# Patient Record
Sex: FEMALE | Race: WHITE | Employment: OTHER | URBAN - METROPOLITAN AREA
[De-identification: names, ages, dates, MRNs, and addresses within clinical notes are randomized per-mention and may not be internally consistent; named-entity substitution may affect disease eponyms.]

---

## 2017-02-13 RX ORDER — LEVOTHYROXINE SODIUM 0.15 MG/1
TABLET ORAL
Qty: 90 TABLET | Refills: 0 | Status: SHIPPED | OUTPATIENT
Start: 2017-02-13 | End: 2017-05-25 | Stop reason: SDUPTHER

## 2017-02-24 ENCOUNTER — OFFICE VISIT (OUTPATIENT)
Dept: FAMILY MEDICINE CLINIC | Facility: CLINIC | Age: 47
End: 2017-02-24

## 2017-02-24 ENCOUNTER — HOSPITAL ENCOUNTER (OUTPATIENT)
Age: 47
Setting detail: SPECIMEN
Discharge: HOME | End: 2017-02-24
Payer: COMMERCIAL

## 2017-02-24 VITALS
DIASTOLIC BLOOD PRESSURE: 80 MMHG | SYSTOLIC BLOOD PRESSURE: 128 MMHG | HEART RATE: 80 BPM | HEIGHT: 68 IN | WEIGHT: 206 LBS | BODY MASS INDEX: 31.22 KG/M2

## 2017-02-24 DIAGNOSIS — E03.8 HYPOTHYROIDISM DUE TO HASHIMOTO'S THYROIDITIS: ICD-10-CM

## 2017-02-24 DIAGNOSIS — E06.3 HYPOTHYROIDISM DUE TO HASHIMOTO'S THYROIDITIS: ICD-10-CM

## 2017-02-24 DIAGNOSIS — E06.3 HYPOTHYROIDISM DUE TO HASHIMOTO'S THYROIDITIS: Primary | ICD-10-CM

## 2017-02-24 DIAGNOSIS — R53.83 OTHER FATIGUE: ICD-10-CM

## 2017-02-24 DIAGNOSIS — Z13.220 ENCOUNTER FOR SCREENING FOR LIPID DISORDER: ICD-10-CM

## 2017-02-24 DIAGNOSIS — R63.5 WEIGHT GAIN: ICD-10-CM

## 2017-02-24 DIAGNOSIS — E03.8 HYPOTHYROIDISM DUE TO HASHIMOTO'S THYROIDITIS: Primary | ICD-10-CM

## 2017-02-24 LAB
ABSOLUTE EOS #: 0.1 K/UL (ref 0–0.4)
ABSOLUTE LYMPH #: 1.3 K/UL (ref 1–4.8)
ABSOLUTE MONO #: 0.5 K/UL (ref 0.1–1.2)
ALBUMIN SERPL-MCNC: 4.2 G/DL (ref 3.5–5.2)
ALBUMIN/GLOBULIN RATIO: 1.4 (ref 1–2.5)
ALP BLD-CCNC: 64 U/L (ref 35–104)
ALT SERPL-CCNC: 17 U/L (ref 5–33)
ANION GAP SERPL CALCULATED.3IONS-SCNC: 23 MMOL/L (ref 9–17)
AST SERPL-CCNC: 18 U/L
BASOPHILS # BLD: 1 % (ref 0–2)
BASOPHILS ABSOLUTE: 0 K/UL (ref 0–0.2)
BILIRUB SERPL-MCNC: 0.35 MG/DL (ref 0.3–1.2)
BUN BLDV-MCNC: 10 MG/DL (ref 6–20)
BUN/CREAT BLD: ABNORMAL (ref 9–20)
CALCIUM SERPL-MCNC: 9.1 MG/DL (ref 8.6–10.4)
CHLORIDE BLD-SCNC: 97 MMOL/L (ref 98–107)
CHOLESTEROL/HDL RATIO: 3.1
CHOLESTEROL: 189 MG/DL
CO2: 22 MMOL/L (ref 20–31)
CREAT SERPL-MCNC: 0.8 MG/DL (ref 0.5–0.9)
DIFFERENTIAL TYPE: ABNORMAL
EOSINOPHILS RELATIVE PERCENT: 1 % (ref 1–4)
GFR AFRICAN AMERICAN: >60 ML/MIN
GFR NON-AFRICAN AMERICAN: >60 ML/MIN
GFR SERPL CREATININE-BSD FRML MDRD: ABNORMAL ML/MIN/{1.73_M2}
GFR SERPL CREATININE-BSD FRML MDRD: ABNORMAL ML/MIN/{1.73_M2}
GLUCOSE BLD-MCNC: 92 MG/DL (ref 70–99)
HCT VFR BLD CALC: 38.1 % (ref 36–46)
HDLC SERPL-MCNC: 61 MG/DL
HEMOGLOBIN: 13 G/DL (ref 12–16)
LDL CHOLESTEROL: 106 MG/DL (ref 0–130)
LYMPHOCYTES # BLD: 23 % (ref 24–44)
MCH RBC QN AUTO: 31.4 PG (ref 26–34)
MCHC RBC AUTO-ENTMCNC: 34.1 G/DL (ref 31–37)
MCV RBC AUTO: 92 FL (ref 80–100)
MONOCYTES # BLD: 9 % (ref 2–11)
PDW BLD-RTO: 12.8 % (ref 12.5–15.4)
PLATELET # BLD: 292 K/UL (ref 140–450)
PLATELET ESTIMATE: ABNORMAL
PMV BLD AUTO: 9 FL (ref 6–12)
POTASSIUM SERPL-SCNC: 4.9 MMOL/L (ref 3.7–5.3)
RBC # BLD: 4.14 M/UL (ref 4–5.2)
RBC # BLD: ABNORMAL 10*6/UL
SEG NEUTROPHILS: 66 % (ref 36–66)
SEGMENTED NEUTROPHILS ABSOLUTE COUNT: 3.8 K/UL (ref 1.8–7.7)
SODIUM BLD-SCNC: 142 MMOL/L (ref 135–144)
T3 FREE: 2.54 PG/ML (ref 2.02–4.43)
THYROXINE, FREE: 1.49 NG/DL (ref 0.93–1.7)
TOTAL PROTEIN: 7.3 G/DL (ref 6.4–8.3)
TRIGL SERPL-MCNC: 109 MG/DL
TSH SERPL DL<=0.05 MIU/L-ACNC: 2.99 MIU/L (ref 0.3–5)
VITAMIN D 25-HYDROXY: 23 NG/ML (ref 30–100)
VLDLC SERPL CALC-MCNC: NORMAL MG/DL (ref 1–30)
WBC # BLD: 5.7 K/UL (ref 3.5–11)
WBC # BLD: ABNORMAL 10*3/UL

## 2017-02-24 PROCEDURE — 99213 OFFICE O/P EST LOW 20 MIN: CPT | Performed by: NURSE PRACTITIONER

## 2017-02-24 RX ORDER — OMEPRAZOLE 20 MG/1
20 TABLET, DELAYED RELEASE ORAL DAILY
COMMUNITY
End: 2017-05-31 | Stop reason: SDUPTHER

## 2017-02-24 ASSESSMENT — ENCOUNTER SYMPTOMS
COUGH: 0
SHORTNESS OF BREATH: 0

## 2017-05-21 ENCOUNTER — HOSPITAL ENCOUNTER (EMERGENCY)
Facility: CLINIC | Age: 47
Discharge: HOME | End: 2017-05-21
Attending: EMERGENCY MEDICINE
Payer: COMMERCIAL

## 2017-05-21 VITALS
BODY MASS INDEX: 31.39 KG/M2 | RESPIRATION RATE: 18 BRPM | TEMPERATURE: 97.8 F | SYSTOLIC BLOOD PRESSURE: 137 MMHG | DIASTOLIC BLOOD PRESSURE: 107 MMHG | HEART RATE: 77 BPM | HEIGHT: 67 IN | OXYGEN SATURATION: 99 % | WEIGHT: 200 LBS

## 2017-05-21 DIAGNOSIS — H66.001 ACUTE SUPPURATIVE OTITIS MEDIA OF RIGHT EAR WITHOUT SPONTANEOUS RUPTURE OF TYMPANIC MEMBRANE, RECURRENCE NOT SPECIFIED: Primary | ICD-10-CM

## 2017-05-21 DIAGNOSIS — J02.0 STREP THROAT: ICD-10-CM

## 2017-05-21 PROCEDURE — 6370000000 HC RX 637 (ALT 250 FOR IP): Performed by: EMERGENCY MEDICINE

## 2017-05-21 PROCEDURE — 99282 EMERGENCY DEPT VISIT SF MDM: CPT

## 2017-05-21 RX ORDER — IBUPROFEN 800 MG/1
800 TABLET ORAL EVERY 8 HOURS PRN
Qty: 20 TABLET | Refills: 0 | Status: SHIPPED | OUTPATIENT
Start: 2017-05-21 | End: 2018-05-06

## 2017-05-21 RX ORDER — AMOXICILLIN 500 MG/1
500 CAPSULE ORAL 3 TIMES DAILY
Qty: 30 CAPSULE | Refills: 0 | Status: SHIPPED | OUTPATIENT
Start: 2017-05-21 | End: 2017-05-31

## 2017-05-21 RX ORDER — AMOXICILLIN 250 MG/1
500 CAPSULE ORAL ONCE
Status: COMPLETED | OUTPATIENT
Start: 2017-05-21 | End: 2017-05-21

## 2017-05-21 RX ORDER — IBUPROFEN 800 MG/1
800 TABLET ORAL ONCE
Status: COMPLETED | OUTPATIENT
Start: 2017-05-21 | End: 2017-05-21

## 2017-05-21 RX ADMIN — IBUPROFEN 800 MG: 800 TABLET, FILM COATED ORAL at 03:03

## 2017-05-21 RX ADMIN — AMOXICILLIN 500 MG: 250 CAPSULE ORAL at 03:03

## 2017-05-21 ASSESSMENT — ENCOUNTER SYMPTOMS
SHORTNESS OF BREATH: 0
CHEST TIGHTNESS: 0
EYE DISCHARGE: 0
ABDOMINAL DISTENTION: 0
ABDOMINAL PAIN: 0
EYE REDNESS: 0
SORE THROAT: 1
FACIAL SWELLING: 0

## 2017-05-26 RX ORDER — LEVOTHYROXINE SODIUM 0.15 MG/1
TABLET ORAL
Qty: 30 TABLET | Refills: 0 | Status: SHIPPED | OUTPATIENT
Start: 2017-05-26 | End: 2017-06-26 | Stop reason: SDUPTHER

## 2017-05-31 ENCOUNTER — OFFICE VISIT (OUTPATIENT)
Dept: FAMILY MEDICINE CLINIC | Age: 47
End: 2017-05-31
Payer: COMMERCIAL

## 2017-05-31 VITALS
BODY MASS INDEX: 31.67 KG/M2 | HEART RATE: 76 BPM | TEMPERATURE: 99.4 F | SYSTOLIC BLOOD PRESSURE: 138 MMHG | WEIGHT: 209 LBS | DIASTOLIC BLOOD PRESSURE: 90 MMHG | HEIGHT: 68 IN

## 2017-05-31 DIAGNOSIS — H65.02 ACUTE SEROUS OTITIS MEDIA OF LEFT EAR, RECURRENCE NOT SPECIFIED: Primary | ICD-10-CM

## 2017-05-31 DIAGNOSIS — K21.9 GASTROESOPHAGEAL REFLUX DISEASE, ESOPHAGITIS PRESENCE NOT SPECIFIED: ICD-10-CM

## 2017-05-31 PROCEDURE — 99213 OFFICE O/P EST LOW 20 MIN: CPT | Performed by: NURSE PRACTITIONER

## 2017-05-31 RX ORDER — FLUTICASONE PROPIONATE 50 MCG
1 SPRAY, SUSPENSION (ML) NASAL 2 TIMES DAILY
Qty: 1 BOTTLE | Refills: 1 | Status: SHIPPED | OUTPATIENT
Start: 2017-05-31 | End: 2018-05-06

## 2017-05-31 RX ORDER — OMEPRAZOLE 20 MG/1
20 TABLET, DELAYED RELEASE ORAL DAILY
Qty: 30 TABLET | Refills: 3 | Status: SHIPPED | OUTPATIENT
Start: 2017-05-31 | End: 2017-10-20 | Stop reason: SDUPTHER

## 2017-05-31 ASSESSMENT — PATIENT HEALTH QUESTIONNAIRE - PHQ9
SUM OF ALL RESPONSES TO PHQ QUESTIONS 1-9: 0
1. LITTLE INTEREST OR PLEASURE IN DOING THINGS: 0
SUM OF ALL RESPONSES TO PHQ9 QUESTIONS 1 & 2: 0
2. FEELING DOWN, DEPRESSED OR HOPELESS: 0

## 2017-05-31 ASSESSMENT — ENCOUNTER SYMPTOMS
SORE THROAT: 0
VOMITING: 0
COUGH: 0

## 2017-06-05 ENCOUNTER — TELEPHONE (OUTPATIENT)
Dept: FAMILY MEDICINE CLINIC | Age: 47
End: 2017-06-05

## 2017-10-20 DIAGNOSIS — K21.9 GASTROESOPHAGEAL REFLUX DISEASE, ESOPHAGITIS PRESENCE NOT SPECIFIED: ICD-10-CM

## 2017-10-20 RX ORDER — LEVOTHYROXINE SODIUM 0.15 MG/1
TABLET ORAL
Qty: 30 TABLET | Refills: 5 | Status: SHIPPED | OUTPATIENT
Start: 2017-10-20 | End: 2018-04-16 | Stop reason: SDUPTHER

## 2017-10-20 RX ORDER — ALUMINUM ZIRCONIUM TRICHLOROHYDREX GLY 0.19 G/G
STICK TOPICAL
Qty: 30 TABLET | Refills: 5 | Status: SHIPPED | OUTPATIENT
Start: 2017-10-20 | End: 2018-04-16 | Stop reason: SDUPTHER

## 2017-10-20 NOTE — TELEPHONE ENCOUNTER
Pharmacy request, last appt 5/31/17    Health Maintenance   Topic Date Due    HIV screen  05/21/1983    DTaP/Tdap/Td vaccine (1 - Tdap) 05/21/1987    Cervical cancer screen  05/21/1989    Flu vaccine (1) 09/01/2017    Lipid screen  02/24/2022             (applicable per patient's age: Cancer Screenings, Depression Screening, Fall Risk Screening, Immunizations)    Hemoglobin A1C (%)   Date Value   03/16/2012 4.9     LDL Cholesterol (mg/dL)   Date Value   02/24/2017 106     AST (U/L)   Date Value   02/24/2017 18     ALT (U/L)   Date Value   02/24/2017 17     BUN (mg/dL)   Date Value   02/24/2017 10      (goal A1C is < 7)   (goal LDL is <100) need 30-50% reduction from baseline     BP Readings from Last 3 Encounters:   05/31/17 (!) 138/90   05/21/17 (!) 137/107   02/24/17 128/80    (goal /80)      All Future Testing planned in [facility]:      Next Visit Date:  No future appointments.          Patient Active Problem List:     Hypothyroidism due to Hashimoto's thyroiditis

## 2018-05-06 ENCOUNTER — HOSPITAL ENCOUNTER (EMERGENCY)
Facility: CLINIC | Age: 48
Discharge: HOME | End: 2018-05-06
Attending: EMERGENCY MEDICINE
Payer: COMMERCIAL

## 2018-05-06 ENCOUNTER — APPOINTMENT (OUTPATIENT)
Dept: GENERAL RADIOLOGY | Facility: CLINIC | Age: 48
End: 2018-05-06
Payer: COMMERCIAL

## 2018-05-06 VITALS
HEART RATE: 81 BPM | WEIGHT: 200 LBS | BODY MASS INDEX: 30.31 KG/M2 | DIASTOLIC BLOOD PRESSURE: 71 MMHG | RESPIRATION RATE: 17 BRPM | HEIGHT: 68 IN | TEMPERATURE: 97.9 F | OXYGEN SATURATION: 100 % | SYSTOLIC BLOOD PRESSURE: 131 MMHG

## 2018-05-06 DIAGNOSIS — S93.402A SPRAIN OF LEFT ANKLE, UNSPECIFIED LIGAMENT, INITIAL ENCOUNTER: Primary | ICD-10-CM

## 2018-05-06 PROCEDURE — 99283 EMERGENCY DEPT VISIT LOW MDM: CPT

## 2018-05-06 PROCEDURE — 73610 X-RAY EXAM OF ANKLE: CPT

## 2018-05-06 ASSESSMENT — PAIN SCALES - GENERAL
PAINLEVEL_OUTOF10: 3
PAINLEVEL_OUTOF10: 3

## 2018-05-06 ASSESSMENT — PAIN DESCRIPTION - ORIENTATION
ORIENTATION: LEFT
ORIENTATION: LEFT

## 2018-05-06 ASSESSMENT — PAIN DESCRIPTION - FREQUENCY: FREQUENCY: INTERMITTENT

## 2018-05-06 ASSESSMENT — PAIN DESCRIPTION - DESCRIPTORS: DESCRIPTORS: ACHING

## 2018-05-06 ASSESSMENT — PAIN DESCRIPTION - PAIN TYPE
TYPE: ACUTE PAIN
TYPE: ACUTE PAIN

## 2018-05-06 ASSESSMENT — PAIN DESCRIPTION - LOCATION
LOCATION: ANKLE
LOCATION: ANKLE

## 2018-05-17 DIAGNOSIS — K21.9 GASTROESOPHAGEAL REFLUX DISEASE, ESOPHAGITIS PRESENCE NOT SPECIFIED: ICD-10-CM

## 2018-05-17 RX ORDER — LEVOTHYROXINE SODIUM 0.15 MG/1
TABLET ORAL
Qty: 30 TABLET | Refills: 0 | Status: SHIPPED | OUTPATIENT
Start: 2018-05-17 | End: 2018-06-16 | Stop reason: SDUPTHER

## 2018-05-17 RX ORDER — ALUMINUM ZIRCONIUM TRICHLOROHYDREX GLY 0.19 G/G
STICK TOPICAL
Qty: 30 TABLET | Refills: 0 | Status: SHIPPED | OUTPATIENT
Start: 2018-05-17 | End: 2018-06-16 | Stop reason: SDUPTHER

## 2018-08-30 ENCOUNTER — OFFICE VISIT (OUTPATIENT)
Dept: FAMILY MEDICINE CLINIC | Age: 48
End: 2018-08-30
Payer: COMMERCIAL

## 2018-08-30 ENCOUNTER — HOSPITAL ENCOUNTER (OUTPATIENT)
Age: 48
Setting detail: SPECIMEN
Discharge: HOME | End: 2018-08-30
Payer: COMMERCIAL

## 2018-08-30 VITALS
SYSTOLIC BLOOD PRESSURE: 132 MMHG | WEIGHT: 213 LBS | BODY MASS INDEX: 32.39 KG/M2 | DIASTOLIC BLOOD PRESSURE: 86 MMHG | TEMPERATURE: 99.2 F | HEART RATE: 84 BPM

## 2018-08-30 DIAGNOSIS — E06.3 HYPOTHYROIDISM DUE TO HASHIMOTO'S THYROIDITIS: ICD-10-CM

## 2018-08-30 DIAGNOSIS — E03.8 HYPOTHYROIDISM DUE TO HASHIMOTO'S THYROIDITIS: ICD-10-CM

## 2018-08-30 DIAGNOSIS — M25.572 CHRONIC PAIN OF LEFT ANKLE: Primary | ICD-10-CM

## 2018-08-30 DIAGNOSIS — Z11.4 SCREENING FOR HIV WITHOUT PRESENCE OF RISK FACTORS: ICD-10-CM

## 2018-08-30 DIAGNOSIS — G89.29 CHRONIC PAIN OF LEFT ANKLE: Primary | ICD-10-CM

## 2018-08-30 LAB
HIV AG/AB: NONREACTIVE
THYROXINE, FREE: 1.65 NG/DL (ref 0.93–1.7)
TSH SERPL DL<=0.05 MIU/L-ACNC: 2.76 MIU/L (ref 0.3–5)

## 2018-08-30 PROCEDURE — 36415 COLL VENOUS BLD VENIPUNCTURE: CPT | Performed by: FAMILY MEDICINE

## 2018-08-30 PROCEDURE — 99214 OFFICE O/P EST MOD 30 MIN: CPT | Performed by: FAMILY MEDICINE

## 2018-08-30 ASSESSMENT — ENCOUNTER SYMPTOMS
COUGH: 0
EYES NEGATIVE: 1
SHORTNESS OF BREATH: 0
ABDOMINAL PAIN: 0
CONSTIPATION: 0
BLOOD IN STOOL: 0

## 2018-08-30 ASSESSMENT — PATIENT HEALTH QUESTIONNAIRE - PHQ9
SUM OF ALL RESPONSES TO PHQ9 QUESTIONS 1 & 2: 0
1. LITTLE INTEREST OR PLEASURE IN DOING THINGS: 0
SUM OF ALL RESPONSES TO PHQ QUESTIONS 1-9: 0
SUM OF ALL RESPONSES TO PHQ QUESTIONS 1-9: 0
2. FEELING DOWN, DEPRESSED OR HOPELESS: 0

## 2018-08-30 NOTE — PROGRESS NOTES
Skin is warm and dry. Psychiatric: She has a normal mood and affect. Her behavior is normal.   Vitals reviewed. Assessment/PLan  1. Chronic pain of left ankle  Discussed options, prefers to see podiatrist.   - [facility]. Trisha Watkins DPM    2. Hypothyroidism due to Hashimoto's thyroiditis  Cont med, check labs. - TSH without Reflex; Future  - T4, Free; Future    3. Screening for HIV without presence of risk factors    - HIV Screen; Future      Marleni received counseling on the following healthy behaviors: exercise and medication adherence  Reviewed prior labs and health maintenance. Continue current medications, diet and exercise. Discussed use, benefit, and side effects of prescribed medications. Barriers to medication compliance addressed. Patient given educational materials - see patient instructions. All patient questions answered. Patient voiced understanding. Return if symptoms worsen or fail to improve.         Electronically signed by Shirley Guidry MD on 8/30/18 at 8:47 AM

## 2018-12-14 ENCOUNTER — TELEPHONE (OUTPATIENT)
Dept: FAMILY MEDICINE CLINIC | Age: 48
End: 2018-12-14
Payer: COMMERCIAL

## 2018-12-14 DIAGNOSIS — Z12.11 SCREENING FOR COLON CANCER: Primary | ICD-10-CM

## 2018-12-14 LAB
CONTROL: NORMAL
HEMOCCULT STL QL: NEGATIVE

## 2018-12-14 PROCEDURE — 82274 ASSAY TEST FOR BLOOD FECAL: CPT | Performed by: FAMILY MEDICINE

## 2019-01-12 DIAGNOSIS — K21.9 GASTROESOPHAGEAL REFLUX DISEASE, ESOPHAGITIS PRESENCE NOT SPECIFIED: ICD-10-CM

## 2019-01-14 RX ORDER — LEVOTHYROXINE SODIUM 0.15 MG/1
TABLET ORAL
Qty: 30 TABLET | Refills: 1 | Status: SHIPPED | OUTPATIENT
Start: 2019-01-14 | End: 2019-03-15 | Stop reason: SDUPTHER

## 2019-01-14 RX ORDER — ALUMINUM ZIRCONIUM TRICHLOROHYDREX GLY 0.19 G/G
STICK TOPICAL
Qty: 30 TABLET | Refills: 1 | Status: SHIPPED | OUTPATIENT
Start: 2019-01-14 | End: 2019-03-15 | Stop reason: SDUPTHER

## 2019-03-15 DIAGNOSIS — K21.9 GASTROESOPHAGEAL REFLUX DISEASE, ESOPHAGITIS PRESENCE NOT SPECIFIED: ICD-10-CM

## 2019-03-15 RX ORDER — LEVOTHYROXINE SODIUM 0.15 MG/1
TABLET ORAL
Qty: 30 TABLET | Refills: 0 | Status: SHIPPED | OUTPATIENT
Start: 2019-03-15 | End: 2019-04-17 | Stop reason: SDUPTHER

## 2019-03-15 RX ORDER — ALUMINUM ZIRCONIUM TRICHLOROHYDREX GLY 0.19 G/G
STICK TOPICAL
Qty: 30 TABLET | Refills: 0 | Status: SHIPPED | OUTPATIENT
Start: 2019-03-15 | End: 2019-04-17 | Stop reason: SDUPTHER

## 2019-04-30 ENCOUNTER — HOSPITAL ENCOUNTER (OUTPATIENT)
Age: 49
Setting detail: SPECIMEN
Discharge: HOME | End: 2019-04-30
Payer: COMMERCIAL

## 2019-04-30 ENCOUNTER — OFFICE VISIT (OUTPATIENT)
Dept: FAMILY MEDICINE CLINIC | Age: 49
End: 2019-04-30
Payer: COMMERCIAL

## 2019-04-30 VITALS
SYSTOLIC BLOOD PRESSURE: 138 MMHG | HEIGHT: 67 IN | HEART RATE: 68 BPM | DIASTOLIC BLOOD PRESSURE: 98 MMHG | BODY MASS INDEX: 31.08 KG/M2 | WEIGHT: 198 LBS | TEMPERATURE: 99.9 F

## 2019-04-30 DIAGNOSIS — R10.13 EPIGASTRIC PAIN: ICD-10-CM

## 2019-04-30 DIAGNOSIS — R10.84 GENERALIZED ABDOMINAL PAIN: ICD-10-CM

## 2019-04-30 DIAGNOSIS — R10.11 RUQ ABDOMINAL PAIN: ICD-10-CM

## 2019-04-30 DIAGNOSIS — K59.01 SLOW TRANSIT CONSTIPATION: ICD-10-CM

## 2019-04-30 DIAGNOSIS — R81 GLUCOSE FOUND IN URINE ON EXAMINATION: ICD-10-CM

## 2019-04-30 DIAGNOSIS — R10.84 GENERALIZED ABDOMINAL PAIN: Primary | ICD-10-CM

## 2019-04-30 LAB
ABSOLUTE EOS #: 0.13 K/UL (ref 0–0.44)
ABSOLUTE IMMATURE GRANULOCYTE: 0.04 K/UL (ref 0–0.3)
ABSOLUTE LYMPH #: 1.91 K/UL (ref 1.1–3.7)
ABSOLUTE MONO #: 0.55 K/UL (ref 0.1–1.2)
ALBUMIN SERPL-MCNC: 4.5 G/DL (ref 3.5–5.2)
ALBUMIN/GLOBULIN RATIO: 1.4 (ref 1–2.5)
ALP BLD-CCNC: 86 U/L (ref 35–104)
ALT SERPL-CCNC: 31 U/L (ref 5–33)
AMYLASE: 110 U/L (ref 28–100)
ANION GAP SERPL CALCULATED.3IONS-SCNC: 13 MMOL/L (ref 9–17)
AST SERPL-CCNC: 20 U/L
BASOPHILS # BLD: 1 % (ref 0–2)
BASOPHILS ABSOLUTE: 0.08 K/UL (ref 0–0.2)
BILIRUB SERPL-MCNC: 0.2 MG/DL (ref 0.3–1.2)
BILIRUBIN, POC: NORMAL
BLOOD URINE, POC: NORMAL
BUN BLDV-MCNC: 11 MG/DL (ref 6–20)
BUN/CREAT BLD: ABNORMAL (ref 9–20)
CALCIUM SERPL-MCNC: 9.3 MG/DL (ref 8.6–10.4)
CHLORIDE BLD-SCNC: 105 MMOL/L (ref 98–107)
CHP ED QC CHECK: NORMAL
CLARITY, POC: CLEAR
CO2: 22 MMOL/L (ref 20–31)
COLOR, POC: NORMAL
CREAT SERPL-MCNC: 0.71 MG/DL (ref 0.5–0.9)
DIFFERENTIAL TYPE: ABNORMAL
EOSINOPHILS RELATIVE PERCENT: 2 % (ref 1–4)
GFR AFRICAN AMERICAN: >60 ML/MIN
GFR NON-AFRICAN AMERICAN: >60 ML/MIN
GFR SERPL CREATININE-BSD FRML MDRD: ABNORMAL ML/MIN/{1.73_M2}
GFR SERPL CREATININE-BSD FRML MDRD: ABNORMAL ML/MIN/{1.73_M2}
GLUCOSE BLD-MCNC: 90 MG/DL (ref 70–99)
GLUCOSE BLD-MCNC: 97 MG/DL
GLUCOSE URINE, POC: 100
HCT VFR BLD CALC: 43.4 % (ref 36.3–47.1)
HEMOGLOBIN: 13.8 G/DL (ref 11.9–15.1)
IMMATURE GRANULOCYTES: 1 %
KETONES, POC: NORMAL
LEUKOCYTE EST, POC: NORMAL
LIPASE: 36 U/L (ref 13–60)
LYMPHOCYTES # BLD: 29 % (ref 24–43)
MCH RBC QN AUTO: 30.9 PG (ref 25.2–33.5)
MCHC RBC AUTO-ENTMCNC: 31.8 G/DL (ref 28.4–34.8)
MCV RBC AUTO: 97.1 FL (ref 82.6–102.9)
MONOCYTES # BLD: 8 % (ref 3–12)
NITRITE, POC: NORMAL
NRBC AUTOMATED: 0 PER 100 WBC
PDW BLD-RTO: 11.9 % (ref 11.8–14.4)
PH, POC: 7
PLATELET # BLD: 337 K/UL (ref 138–453)
PLATELET ESTIMATE: ABNORMAL
PMV BLD AUTO: 10.9 FL (ref 8.1–13.5)
POTASSIUM SERPL-SCNC: 4.9 MMOL/L (ref 3.7–5.3)
PROTEIN, POC: NORMAL
RBC # BLD: 4.47 M/UL (ref 3.95–5.11)
RBC # BLD: ABNORMAL 10*6/UL
SEG NEUTROPHILS: 59 % (ref 36–65)
SEGMENTED NEUTROPHILS ABSOLUTE COUNT: 3.84 K/UL (ref 1.5–8.1)
SODIUM BLD-SCNC: 140 MMOL/L (ref 135–144)
SPECIFIC GRAVITY, POC: 1.01
TOTAL PROTEIN: 7.8 G/DL (ref 6.4–8.3)
UROBILINOGEN, POC: 0.2
WBC # BLD: 6.6 K/UL (ref 3.5–11.3)
WBC # BLD: ABNORMAL 10*3/UL

## 2019-04-30 PROCEDURE — 36415 COLL VENOUS BLD VENIPUNCTURE: CPT | Performed by: FAMILY MEDICINE

## 2019-04-30 PROCEDURE — 99214 OFFICE O/P EST MOD 30 MIN: CPT | Performed by: FAMILY MEDICINE

## 2019-04-30 PROCEDURE — 82962 GLUCOSE BLOOD TEST: CPT | Performed by: FAMILY MEDICINE

## 2019-04-30 PROCEDURE — 81003 URINALYSIS AUTO W/O SCOPE: CPT | Performed by: FAMILY MEDICINE

## 2019-04-30 ASSESSMENT — PATIENT HEALTH QUESTIONNAIRE - PHQ9
2. FEELING DOWN, DEPRESSED OR HOPELESS: 0
1. LITTLE INTEREST OR PLEASURE IN DOING THINGS: 0
SUM OF ALL RESPONSES TO PHQ9 QUESTIONS 1 & 2: 0
SUM OF ALL RESPONSES TO PHQ QUESTIONS 1-9: 0
SUM OF ALL RESPONSES TO PHQ QUESTIONS 1-9: 0

## 2019-04-30 ASSESSMENT — ENCOUNTER SYMPTOMS
EYES NEGATIVE: 1
SHORTNESS OF BREATH: 0
BLOOD IN STOOL: 0
NAUSEA: 0
CONSTIPATION: 1
ABDOMINAL PAIN: 1
DIARRHEA: 0
COUGH: 0
VOMITING: 0

## 2019-04-30 NOTE — PROGRESS NOTES
Visit Information    Have you changed or started any medications since your last visit including any over-the-counter medicines, vitamins, or herbal medicines? no   Are you having any side effects from any of your medications? -  yes - constipation  Have you stopped taking any of your medications? Is so, why? -  no    Have you seen any other physician or provider since your last visit? No  Have you had any other diagnostic tests since your last visit? No  Have you been seen in the emergency room and/or had an admission to a hospital since we last saw you? No  Have you had your routine dental cleaning in the past 6 months? yes - February    Have you activated your [employer] account? If not, what are your barriers?  Yes     Patient Care Team:  Karol Ram MD as PCP - General (Family Medicine)    Medical History Review  Past Medical, Family, and Social History reviewed and does contribute to the patient presenting condition    Health Maintenance   Topic Date Due    DTaP/Tdap/Td vaccine (1 - Tdap) 05/21/1987    Shingles Vaccine (1 of 2) 05/21/2018    Breast cancer screen  02/08/2019    TSH testing  08/30/2019    Flu vaccine (Season Ended) 09/01/2019    Colon Cancer Screen FIT/FOBT  12/14/2019    Cervical cancer screen  08/02/2020    Lipid screen  02/24/2022    HIV screen  Completed    Pneumococcal 0-64 years Vaccine  Aged Out

## 2019-04-30 NOTE — PROGRESS NOTES
[facility] [place] [facility]  SARIKA SILVERIO [facility]  [address]  [address] [address]  Dept: [phone]    4/30/2019    CHIEF COMPLAINT    Chief Complaint   Patient presents with    Abdominal Pain       HPI    Yessica Solares is a 46 y.o. female who presents   Chief Complaint   Patient presents with    Abdominal Pain   . Abdominal Pain   This is a recurrent problem. The current episode started more than 1 month ago. The onset quality is sudden. The problem occurs every several days. Duration: 5 minutes. The problem has been waxing and waning. The pain is located in the generalized abdominal region, epigastric region, RUQ and LUQ. The pain is severe. The quality of the pain is colicky and cramping. The abdominal pain does not radiate. Associated symptoms include constipation (last bm 4 days ago). Pertinent negatives include no diarrhea, fever, frequency, headaches, nausea, vomiting or weight loss. The pain is relieved by standing. She has tried nothing for the symptoms. Prior diagnostic workup includes upper endoscopy (years ago). Vitals:    04/30/19 0900   BP: (!) 142/98   Pulse: 68   Temp: 99.9 °F (37.7 °C)   Weight: 198 lb (89.8 kg)   Height: 5' 7\" (1.702 m)       REVIEW OF SYSTEMS    Review of Systems   Constitutional: Negative for fever and weight loss. HENT: Negative. Eyes: Negative. Respiratory: Negative for cough and shortness of breath. Cardiovascular: Negative for chest pain, palpitations and leg swelling. Gastrointestinal: Positive for abdominal pain and constipation (last bm 4 days ago). Negative for blood in stool, diarrhea, nausea and vomiting. Genitourinary: Negative for frequency and urgency. Musculoskeletal: Negative. Skin: Negative. Neurological: Negative for dizziness and headaches. Psychiatric/Behavioral: The patient is not nervous/anxious.         PAST MEDICAL HISTORY    Past Medical History:   Diagnosis Date    Abnormal Pap smear of cervix     GERD (gastroesophageal reflux disease)     Hypothyroidism        FAMILY HISTORY    Family History   Problem Relation Age of Onset    Cancer Mother         unknown    COPD Father     Kidney Disease Father     Colon Polyps Brother        SOCIAL HISTORY    Social History     Socioeconomic History    Marital status:      Spouse name: None    Number of children: 2    Years of education: None    Highest education level: None   Occupational History    Occupation:    Social Needs    Financial resource strain: None    Food insecurity:     Worry: None     Inability: None    Transportation needs:     Medical: None     Non-medical: None   Tobacco Use    Smoking status: Former Smoker     Last attempt to quit: 2010     Years since quittin.1    Smokeless tobacco: Never Used   Substance and Sexual Activity    Alcohol use: Yes     Comment: rarely    Drug use: No    Sexual activity: Yes     Partners: Male   Lifestyle    Physical activity:     Days per week: None     Minutes per session: None    Stress: None   Relationships    Social connections:     Talks on phone: None     Gets together: None     Attends Congregation service: None     Active member of club or organization: None     Attends meetings of clubs or organizations: None     Relationship status: None    Intimate partner violence:     Fear of current or ex partner: None     Emotionally abused: None     Physically abused: None     Forced sexual activity: None   Other Topics Concern    None   Social History Narrative    None       SURGICAL HISTORY    Past Surgical History:   Procedure Laterality Date    LEEP      x3    TONSILLECTOMY         CURRENT MEDICATIONS    Current Outpatient Medications   Medication Sig Dispense Refill    PRILOSEC OTC 20 MG tablet TAKE ONE TABLET BY MOUTH DAILY 30 tablet 3    levothyroxine (SYNTHROID) 150 MCG tablet TAKE ONE TABLET BY MOUTH DAILY 30 tablet 3     No current facility-administered medications for this visit. ALLERGIES    No Known Allergies    PHYSICAL EXAM   Physical Exam   Constitutional: She is oriented to person, place, and time. She appears well-developed and well-nourished. HENT:   Head: Normocephalic. Mouth/Throat: Oropharynx is clear and moist.   Eyes: Pupils are equal, round, and reactive to light. Neck: Normal range of motion. Neck supple. No thyromegaly present. Cardiovascular: Normal rate, regular rhythm and normal heart sounds. No murmur heard. Pulmonary/Chest: Effort normal and breath sounds normal. She has no wheezes. She has no rales. Abdominal: Soft. She exhibits distension. There is tenderness in the right upper quadrant and epigastric area. There is no rebound, no guarding, no CVA tenderness, no tenderness at McBurney's point and negative Gonzalez's sign. Musculoskeletal: Normal range of motion. She exhibits no edema, tenderness or deformity. Lymphadenopathy:     She has no cervical adenopathy. Neurological: She is alert and oriented to person, place, and time. Skin: Skin is warm and dry. Psychiatric: She has a normal mood and affect. Her behavior is normal.   Vitals reviewed. Assessment/PLan  1. Generalized abdominal pain  Check labs and us. ua-normal except glucose. bs 95  - POCT Urinalysis No Micro (Auto)  - Urine Culture; Future  - Amylase; Future  - CBC Auto Differential; Future  - Lipase; Future  - Comprehensive Metabolic Panel; Future  - US GALLBLADDER RUQ; Future  - US LIVER; Future  - US PANCREAS; Future    2. Epigastric pain    - Amylase; Future  - Lipase; Future  - US PANCREAS; Future    3. RUQ abdominal pain    - Comprehensive Metabolic Panel; Future  - US GALLBLADDER RUQ; Future  - US LIVER; Future    4. Slow transit constipation  Encouraged using miralax to enhance regular bm. Increase hydration.        Darryl Kamron received counseling on the following healthy behaviors: nutrition and medication adherence  Reviewed prior labs and health maintenance. Continue current medications, diet and exercise. Discussed use, benefit, and side effects of prescribed medications. Barriers to medication compliance addressed. Patient given educational materials - see patient instructions. All patient questions answered. Patient voiced understanding. Return if symptoms worsen or fail to improve.         Electronically signed by Verdene Fleischer, MD on 4/30/19 at 9:04 AM

## 2019-05-01 ENCOUNTER — HOSPITAL ENCOUNTER (OUTPATIENT)
Dept: ULTRASOUND IMAGING | Facility: CLINIC | Age: 49
Discharge: HOME | End: 2019-05-03
Payer: COMMERCIAL

## 2019-05-01 DIAGNOSIS — R10.11 RUQ ABDOMINAL PAIN: ICD-10-CM

## 2019-05-01 DIAGNOSIS — R10.84 GENERALIZED ABDOMINAL PAIN: ICD-10-CM

## 2019-05-01 PROCEDURE — 76705 ECHO EXAM OF ABDOMEN: CPT

## 2019-05-02 ENCOUNTER — TELEPHONE (OUTPATIENT)
Dept: FAMILY MEDICINE CLINIC | Age: 49
End: 2019-05-02

## 2019-05-02 DIAGNOSIS — N30.00 ACUTE CYSTITIS WITHOUT HEMATURIA: Primary | ICD-10-CM

## 2019-05-02 DIAGNOSIS — K80.20 GALLSTONES: ICD-10-CM

## 2019-05-02 DIAGNOSIS — R10.84 GENERALIZED ABDOMINAL PAIN: Primary | ICD-10-CM

## 2019-05-02 LAB
CULTURE: ABNORMAL
Lab: ABNORMAL
SPECIMEN DESCRIPTION: ABNORMAL

## 2019-05-02 RX ORDER — CIPROFLOXACIN 500 MG/1
500 TABLET, FILM COATED ORAL 2 TIMES DAILY
Qty: 10 TABLET | Refills: 0 | Status: SHIPPED | OUTPATIENT
Start: 2019-05-02 | End: 2019-05-07

## 2019-05-02 NOTE — TELEPHONE ENCOUNTER
----- Message from Damian Samaniego MD sent at 5/2/2019  6:45 AM EDT -----  Dr. Carola Gregory at [facility]

## 2019-05-13 ENCOUNTER — TELEPHONE (OUTPATIENT)
Dept: FAMILY MEDICINE CLINIC | Age: 49
End: 2019-05-13

## 2019-05-13 DIAGNOSIS — K80.20 GALLSTONES: ICD-10-CM

## 2019-05-13 DIAGNOSIS — R10.84 GENERALIZED ABDOMINAL PAIN: Primary | ICD-10-CM

## 2019-05-14 ENCOUNTER — TELEPHONE (OUTPATIENT)
Dept: FAMILY MEDICINE CLINIC | Age: 49
End: 2019-05-14

## 2019-05-14 DIAGNOSIS — K80.20 GALLSTONES: ICD-10-CM

## 2019-05-14 DIAGNOSIS — R10.84 GENERALIZED ABDOMINAL PAIN: Primary | ICD-10-CM

## 2019-05-14 NOTE — TELEPHONE ENCOUNTER
Requesting referral to Dr. Essence Gonzalez because she checked and she can get her in sooner.  Fax [phone]  pended

## 2019-12-06 ENCOUNTER — OFFICE VISIT (OUTPATIENT)
Dept: FAMILY MEDICINE CLINIC | Age: 49
End: 2019-12-06
Payer: COMMERCIAL

## 2019-12-06 ENCOUNTER — HOSPITAL ENCOUNTER (OUTPATIENT)
Age: 49
Setting detail: SPECIMEN
Discharge: HOME | End: 2019-12-06
Payer: COMMERCIAL

## 2019-12-06 VITALS
SYSTOLIC BLOOD PRESSURE: 132 MMHG | HEIGHT: 67 IN | HEART RATE: 100 BPM | DIASTOLIC BLOOD PRESSURE: 84 MMHG | WEIGHT: 205 LBS | TEMPERATURE: 98.6 F | BODY MASS INDEX: 32.18 KG/M2

## 2019-12-06 DIAGNOSIS — R30.0 DYSURIA: ICD-10-CM

## 2019-12-06 DIAGNOSIS — J02.9 SORE THROAT: Primary | ICD-10-CM

## 2019-12-06 DIAGNOSIS — R31.29 OTHER MICROSCOPIC HEMATURIA: ICD-10-CM

## 2019-12-06 DIAGNOSIS — J02.9 SORE THROAT: ICD-10-CM

## 2019-12-06 LAB
BILIRUBIN, POC: NORMAL
BLOOD URINE, POC: NORMAL
CLARITY, POC: NORMAL
COLOR, POC: YELLOW
GLUCOSE URINE, POC: NORMAL
KETONES, POC: NORMAL
LEUKOCYTE EST, POC: NORMAL
NITRITE, POC: NORMAL
PH, POC: 5.5
PROTEIN, POC: NORMAL
S PYO AG THROAT QL: NORMAL
SPECIFIC GRAVITY, POC: 1.01
UROBILINOGEN, POC: 0.2

## 2019-12-06 PROCEDURE — 87880 STREP A ASSAY W/OPTIC: CPT | Performed by: FAMILY MEDICINE

## 2019-12-06 PROCEDURE — 81003 URINALYSIS AUTO W/O SCOPE: CPT | Performed by: FAMILY MEDICINE

## 2019-12-06 PROCEDURE — 99213 OFFICE O/P EST LOW 20 MIN: CPT | Performed by: FAMILY MEDICINE

## 2019-12-06 RX ORDER — CIPROFLOXACIN 500 MG/1
500 TABLET, FILM COATED ORAL 2 TIMES DAILY
Qty: 10 TABLET | Refills: 0 | Status: SHIPPED | OUTPATIENT
Start: 2019-12-06 | End: 2020-04-01 | Stop reason: SDUPTHER

## 2019-12-08 LAB
CULTURE: ABNORMAL
CULTURE: NORMAL
Lab: ABNORMAL
Lab: NORMAL
SPECIMEN DESCRIPTION: ABNORMAL
SPECIMEN DESCRIPTION: NORMAL

## 2019-12-19 RX ORDER — LEVOTHYROXINE SODIUM 0.15 MG/1
TABLET ORAL
Qty: 30 TABLET | Refills: 2 | Status: SHIPPED | OUTPATIENT
Start: 2019-12-19 | End: 2020-03-17

## 2019-12-26 ENCOUNTER — OFFICE VISIT (OUTPATIENT)
Dept: FAMILY MEDICINE CLINIC | Age: 49
End: 2019-12-26
Payer: COMMERCIAL

## 2019-12-26 ENCOUNTER — HOSPITAL ENCOUNTER (OUTPATIENT)
Age: 49
Setting detail: SPECIMEN
Discharge: HOME | End: 2019-12-26
Payer: COMMERCIAL

## 2019-12-26 VITALS
DIASTOLIC BLOOD PRESSURE: 84 MMHG | HEIGHT: 68 IN | WEIGHT: 206 LBS | HEART RATE: 88 BPM | SYSTOLIC BLOOD PRESSURE: 118 MMHG | BODY MASS INDEX: 31.22 KG/M2

## 2019-12-26 DIAGNOSIS — R31.21 ASYMPTOMATIC MICROSCOPIC HEMATURIA: Primary | ICD-10-CM

## 2019-12-26 DIAGNOSIS — E06.3 HYPOTHYROIDISM DUE TO HASHIMOTO'S THYROIDITIS: ICD-10-CM

## 2019-12-26 DIAGNOSIS — E03.8 HYPOTHYROIDISM DUE TO HASHIMOTO'S THYROIDITIS: ICD-10-CM

## 2019-12-26 LAB
BILIRUBIN, POC: NORMAL
BLOOD URINE, POC: NORMAL
CLARITY, POC: CLEAR
COLOR, POC: YELLOW
GLUCOSE URINE, POC: NORMAL
KETONES, POC: NORMAL
LEUKOCYTE EST, POC: NORMAL
NITRITE, POC: NORMAL
PH, POC: 7.5
PROTEIN, POC: NORMAL
SPECIFIC GRAVITY, POC: 1.02
THYROXINE, FREE: 1.46 NG/DL (ref 0.93–1.7)
TSH SERPL DL<=0.05 MIU/L-ACNC: 2.19 MIU/L (ref 0.3–5)
UROBILINOGEN, POC: 0.2

## 2019-12-26 PROCEDURE — 36415 COLL VENOUS BLD VENIPUNCTURE: CPT | Performed by: FAMILY MEDICINE

## 2019-12-26 PROCEDURE — 81003 URINALYSIS AUTO W/O SCOPE: CPT | Performed by: FAMILY MEDICINE

## 2019-12-26 PROCEDURE — 99214 OFFICE O/P EST MOD 30 MIN: CPT | Performed by: FAMILY MEDICINE

## 2019-12-26 ASSESSMENT — ENCOUNTER SYMPTOMS
RESPIRATORY NEGATIVE: 1
NAUSEA: 0
ALLERGIC/IMMUNOLOGIC NEGATIVE: 1
VOMITING: 0
EYES NEGATIVE: 1

## 2020-02-06 ENCOUNTER — HOSPITAL ENCOUNTER (OUTPATIENT)
Dept: CT IMAGING | Facility: CLINIC | Age: 50
Discharge: HOME | End: 2020-02-08
Payer: COMMERCIAL

## 2020-02-06 PROCEDURE — 74178 CT ABD&PLV WO CNTR FLWD CNTR: CPT

## 2020-02-06 PROCEDURE — 6360000004 HC RX CONTRAST MEDICATION: Performed by: UROLOGY

## 2020-02-06 PROCEDURE — 2580000003 HC RX 258: Performed by: UROLOGY

## 2020-02-06 RX ORDER — 0.9 % SODIUM CHLORIDE 0.9 %
80 INTRAVENOUS SOLUTION INTRAVENOUS ONCE
Status: COMPLETED | OUTPATIENT
Start: 2020-02-06 | End: 2020-02-06

## 2020-02-06 RX ORDER — SODIUM CHLORIDE 0.9 % (FLUSH) 0.9 %
10 SYRINGE (ML) INJECTION PRN
Status: DISCONTINUED | OUTPATIENT
Start: 2020-02-06 | End: 2020-02-09 | Stop reason: HOSPADM

## 2020-02-06 RX ADMIN — SODIUM CHLORIDE 80 ML: 9 INJECTION, SOLUTION INTRAVENOUS at 10:57

## 2020-02-06 RX ADMIN — Medication 10 ML: at 10:57

## 2020-02-06 RX ADMIN — IOPAMIDOL 120 ML: 755 INJECTION, SOLUTION INTRAVENOUS at 10:56

## 2020-02-26 ENCOUNTER — TELEPHONE (OUTPATIENT)
Dept: FAMILY MEDICINE CLINIC | Age: 50
End: 2020-02-26

## 2020-03-17 RX ORDER — LEVOTHYROXINE SODIUM 0.15 MG/1
TABLET ORAL
Qty: 30 TABLET | Refills: 1 | Status: SHIPPED | OUTPATIENT
Start: 2020-03-17 | End: 2020-05-18

## 2020-04-01 ENCOUNTER — TELEPHONE (OUTPATIENT)
Dept: FAMILY MEDICINE CLINIC | Age: 50
End: 2020-04-01

## 2020-04-01 RX ORDER — CIPROFLOXACIN 500 MG/1
500 TABLET, FILM COATED ORAL 2 TIMES DAILY
Qty: 10 TABLET | Refills: 0 | Status: SHIPPED | OUTPATIENT
Start: 2020-04-01 | End: 2020-04-06

## 2020-04-01 NOTE — TELEPHONE ENCOUNTER
Pt stated she has burning with urination that started this morning. Pt stated this is the third one she has had in a very short time, could taking soak baths be causing this?  Please send medication to Joaquina Huitron and Vicki Concepcion

## 2020-04-26 ENCOUNTER — HOSPITAL ENCOUNTER (EMERGENCY)
Facility: CLINIC | Age: 50
Discharge: HOME | End: 2020-04-26
Attending: EMERGENCY MEDICINE
Payer: COMMERCIAL

## 2020-04-26 ENCOUNTER — APPOINTMENT (OUTPATIENT)
Dept: GENERAL RADIOLOGY | Facility: CLINIC | Age: 50
End: 2020-04-26
Payer: COMMERCIAL

## 2020-04-26 VITALS
BODY MASS INDEX: 31.39 KG/M2 | HEART RATE: 85 BPM | SYSTOLIC BLOOD PRESSURE: 129 MMHG | WEIGHT: 200 LBS | OXYGEN SATURATION: 98 % | HEIGHT: 67 IN | TEMPERATURE: 97.2 F | RESPIRATION RATE: 12 BRPM | DIASTOLIC BLOOD PRESSURE: 91 MMHG

## 2020-04-26 PROCEDURE — 99283 EMERGENCY DEPT VISIT LOW MDM: CPT

## 2020-04-26 PROCEDURE — 73100 X-RAY EXAM OF WRIST: CPT

## 2020-04-26 PROCEDURE — 99152 MOD SED SAME PHYS/QHP 5/>YRS: CPT

## 2020-04-26 PROCEDURE — 73110 X-RAY EXAM OF WRIST: CPT

## 2020-04-26 PROCEDURE — 96374 THER/PROPH/DIAG INJ IV PUSH: CPT

## 2020-04-26 PROCEDURE — 25565 CLTX RDL&ULN SHFT FX W/MNPJ: CPT

## 2020-04-26 PROCEDURE — 73030 X-RAY EXAM OF SHOULDER: CPT

## 2020-04-26 PROCEDURE — 2500000003 HC RX 250 WO HCPCS: Performed by: EMERGENCY MEDICINE

## 2020-04-26 PROCEDURE — 96375 TX/PRO/DX INJ NEW DRUG ADDON: CPT

## 2020-04-26 PROCEDURE — 96361 HYDRATE IV INFUSION ADD-ON: CPT

## 2020-04-26 PROCEDURE — 2580000003 HC RX 258: Performed by: EMERGENCY MEDICINE

## 2020-04-26 PROCEDURE — 6360000002 HC RX W HCPCS: Performed by: EMERGENCY MEDICINE

## 2020-04-26 RX ORDER — MIDAZOLAM HYDROCHLORIDE 1 MG/ML
1 INJECTION INTRAMUSCULAR; INTRAVENOUS ONCE
Status: COMPLETED | OUTPATIENT
Start: 2020-04-26 | End: 2020-04-26

## 2020-04-26 RX ORDER — OXYCODONE HYDROCHLORIDE AND ACETAMINOPHEN 5; 325 MG/1; MG/1
1 TABLET ORAL EVERY 6 HOURS PRN
Qty: 12 TABLET | Refills: 0 | Status: SHIPPED | OUTPATIENT
Start: 2020-04-26 | End: 2020-04-29

## 2020-04-26 RX ORDER — ONDANSETRON 4 MG/1
4 TABLET, FILM COATED ORAL EVERY 8 HOURS PRN
Qty: 6 TABLET | Refills: 0 | Status: SHIPPED | OUTPATIENT
Start: 2020-04-26 | End: 2020-04-28

## 2020-04-26 RX ORDER — 0.9 % SODIUM CHLORIDE 0.9 %
500 INTRAVENOUS SOLUTION INTRAVENOUS ONCE
Status: COMPLETED | OUTPATIENT
Start: 2020-04-26 | End: 2020-04-26

## 2020-04-26 RX ORDER — KETOROLAC TROMETHAMINE 15 MG/ML
15 INJECTION, SOLUTION INTRAMUSCULAR; INTRAVENOUS ONCE
Status: COMPLETED | OUTPATIENT
Start: 2020-04-26 | End: 2020-04-26

## 2020-04-26 RX ORDER — FENTANYL CITRATE 50 UG/ML
50 INJECTION, SOLUTION INTRAMUSCULAR; INTRAVENOUS ONCE
Status: COMPLETED | OUTPATIENT
Start: 2020-04-26 | End: 2020-04-26

## 2020-04-26 RX ORDER — ONDANSETRON 2 MG/ML
4 INJECTION INTRAMUSCULAR; INTRAVENOUS ONCE
Status: COMPLETED | OUTPATIENT
Start: 2020-04-26 | End: 2020-04-26

## 2020-04-26 RX ORDER — ETOMIDATE 2 MG/ML
10 INJECTION INTRAVENOUS ONCE
Status: COMPLETED | OUTPATIENT
Start: 2020-04-26 | End: 2020-04-26

## 2020-04-26 RX ADMIN — KETOROLAC TROMETHAMINE 15 MG: 15 INJECTION, SOLUTION INTRAMUSCULAR; INTRAVENOUS at 17:00

## 2020-04-26 RX ADMIN — SODIUM CHLORIDE 500 ML: 9 INJECTION, SOLUTION INTRAVENOUS at 17:20

## 2020-04-26 RX ADMIN — HYDROMORPHONE HYDROCHLORIDE 0.5 MG: 1 INJECTION, SOLUTION INTRAMUSCULAR; INTRAVENOUS; SUBCUTANEOUS at 17:00

## 2020-04-26 RX ADMIN — ETOMIDATE 10 MG: 2 INJECTION, SOLUTION INTRAVENOUS at 18:35

## 2020-04-26 RX ADMIN — FENTANYL CITRATE 50 MCG: 50 INJECTION, SOLUTION INTRAMUSCULAR; INTRAVENOUS at 18:52

## 2020-04-26 RX ADMIN — ETOMIDATE 4 MG: 2 INJECTION, SOLUTION INTRAVENOUS at 18:37

## 2020-04-26 RX ADMIN — ONDANSETRON 4 MG: 2 INJECTION INTRAMUSCULAR; INTRAVENOUS at 17:00

## 2020-04-26 RX ADMIN — MIDAZOLAM 1 MG: 1 INJECTION INTRAMUSCULAR; INTRAVENOUS at 18:49

## 2020-04-26 ASSESSMENT — PAIN SCALES - GENERAL
PAINLEVEL_OUTOF10: 10
PAINLEVEL_OUTOF10: 10
PAINLEVEL_OUTOF10: 3
PAINLEVEL_OUTOF10: 6

## 2020-04-26 ASSESSMENT — PAIN DESCRIPTION - ORIENTATION: ORIENTATION: RIGHT

## 2020-04-26 ASSESSMENT — PAIN DESCRIPTION - LOCATION: LOCATION: WRIST;SHOULDER

## 2020-04-26 ASSESSMENT — PAIN DESCRIPTION - PROGRESSION: CLINICAL_PROGRESSION: GRADUALLY IMPROVING

## 2020-04-26 NOTE — ED NOTES
[facility], Radiology at bedside for post reduction films, pt awake alert et oriented, no distress noted, continue to monitor      Osiris Holden RN  04/26/20 1919

## 2020-04-26 NOTE — ED PROVIDER NOTES
facial asymmetry. Moving all 4 extremities. Gait testing initially deferred. Psychiatric:         Mood and Affect: Mood normal.         EMERGENCY DEPARTMENT COURSE and DIFFERENTIAL DIAGNOSIS/MDM:   Vitals:    Vitals:    04/26/20 1848 04/26/20 1854 04/26/20 1856 04/26/20 1919   BP:    (!) 129/91   Pulse:    85   Resp: 21 14 12 12   Temp:    97.2 °F (36.2 °C)   TempSrc:       SpO2: 97% 97% 99% 98%   Weight:       Height:           Patient presents to the emergency department with a complaint described above. Vitals are grossly normal and she is nontoxic. Physical examination reveals evidence of injury at the right wrist clearly, possible injury of the right shoulder. No other obvious trauma. At this time she will need x-rays of the affected extremities. She is neurovascularly intact in the affected extremities. I have ordered some IV fluids, IV pain medication, IV anti-inflammatory and IV antiemetics and I will reevaluate. DIAGNOSTIC RESULTS     LABS:  Labs Reviewed - No data to display    All other labs were within normal range or not returned as of this dictation. RADIOLOGY:  XR SHOULDER RIGHT (MIN 2 VIEWS)   Final Result   Acute fracture of the posterior right humeral head and surgical neck. XR WRIST RIGHT (MIN 3 VIEWS)   Preliminary Result   Acute distal radius and ulna fractures. XR WRIST RIGHT (2 VIEWS)    (Results Pending)         ED Course as of Apr 26 1919   Sun Apr 26, 2020   1700 Patient states she last ate approximately 4 hours ago. [TS]   [id number] Spoke with Dr. Barbie Cazares at 18, he is recommending that I go ahead and reduce the wrist in the emergency department, do a sugar tong type splint in the right upper extremity followed by a sling and follow-up in his office tomorrow    [TS]   1847 Did obtain verbal and written consent prior to the procedural sedation.   Right at the end of the procedure she started waking up a little bit before I put the sling on, I did order some more pain medication, fentanyl and some Versed IV.    [TS]      ED Course User Index  [TS] Jake Ignacio DO         PROCEDURES:  Unless otherwise noted below, none     Procedural sedation  Date/Time: 4/26/2020 7:16 PM  Performed by: Jake Ignacio DO  Authorized by: Jake Ignacio DO     Consent:     Consent obtained:  Verbal and written    Consent given by:  Patient    Risks discussed:   Allergic reaction, dysrhythmia, inadequate sedation, nausea, vomiting, prolonged sedation necessitating reversal and prolonged hypoxia resulting in organ damage    Alternatives discussed:  Analgesia without sedation and regional anesthesia  Indications:     Procedure performed:  Fracture reduction    Procedure necessitating sedation performed by:  Physician performing sedation    Intended level of sedation:  Moderate (conscious sedation)  Pre-sedation assessment:     Time since last food or drink:  1pm    ASA classification: class 2 - patient with mild systemic disease      Neck mobility: normal      Mouth opening:  3 or more finger widths    Mallampati score:  III - soft palate, base of uvula visible    Pre-sedation assessments completed and reviewed: airway patency, cardiovascular function, hydration status, mental status, nausea/vomiting, pain level, respiratory function and temperature      History of difficult intubation: no      Pre-sedation assessment completed:  4/26/2020 7:18 PM  Immediate pre-procedure details:     Reassessment: Patient reassessed immediately prior to procedure      Reviewed: vital signs and NPO status      Verified: bag valve mask available, emergency equipment available, intubation equipment available, IV patency confirmed, oxygen available and suction available    Procedure details (see MAR for exact dosages):     Preoxygenation:  Nasal cannula    Sedation:  Etomidate    Analgesia:  Fentanyl    Intra-procedure monitoring:  Blood pressure monitoring, cardiac monitor, continuous capnometry,

## 2020-04-28 ENCOUNTER — OFFICE VISIT (OUTPATIENT)
Dept: ORTHOPEDIC SURGERY | Age: 50
End: 2020-04-28
Payer: COMMERCIAL

## 2020-04-28 ENCOUNTER — HOSPITAL ENCOUNTER (OUTPATIENT)
Dept: CT IMAGING | Age: 50
Discharge: HOME | End: 2020-04-30
Payer: COMMERCIAL

## 2020-04-28 VITALS — HEIGHT: 67 IN | BODY MASS INDEX: 31.38 KG/M2 | WEIGHT: 199.96 LBS

## 2020-04-28 PROCEDURE — 73200 CT UPPER EXTREMITY W/O DYE: CPT

## 2020-04-28 PROCEDURE — 99203 OFFICE O/P NEW LOW 30 MIN: CPT | Performed by: STUDENT IN AN ORGANIZED HEALTH CARE EDUCATION/TRAINING PROGRAM

## 2020-04-29 ENCOUNTER — HOSPITAL ENCOUNTER (OUTPATIENT)
Dept: PREADMISSION TESTING | Age: 50
Discharge: HOME | End: 2020-05-03
Payer: COMMERCIAL

## 2020-04-29 PROCEDURE — U0002 COVID-19 LAB TEST NON-CDC: HCPCS

## 2020-04-30 LAB
SARS-COV-2, PCR: NORMAL
SARS-COV-2, RAPID: NORMAL
SARS-COV-2: NOT DETECTED
SOURCE: NORMAL

## 2020-05-01 ENCOUNTER — TELEPHONE (OUTPATIENT)
Dept: FAMILY MEDICINE CLINIC | Age: 50
End: 2020-05-01

## 2020-05-01 ENCOUNTER — OFFICE VISIT (OUTPATIENT)
Dept: ORTHOPEDIC SURGERY | Age: 50
End: 2020-05-01
Payer: COMMERCIAL

## 2020-05-01 VITALS — HEIGHT: 68 IN | BODY MASS INDEX: 30.31 KG/M2 | WEIGHT: 200 LBS

## 2020-05-01 PROBLEM — S42.251A CLOSED DISPLACED FRACTURE OF GREATER TUBEROSITY OF RIGHT HUMERUS: Status: ACTIVE | Noted: 2020-05-01

## 2020-05-01 PROBLEM — S52.501A CLOSED FRACTURE OF RIGHT DISTAL RADIUS: Status: ACTIVE | Noted: 2020-05-01

## 2020-05-01 PROCEDURE — 99214 OFFICE O/P EST MOD 30 MIN: CPT | Performed by: ORTHOPAEDIC SURGERY

## 2020-05-01 NOTE — LETTER
[facility], [address] [place] [address], [address] [place] [address]  [phone]    Dr. Alexander Villalobos MD        2020      RE: Peter QUARLES 1970    Dear Dr. Dee Gudino,    Based on review of information available to me at this time, the patient appears to be at no increased risk for the planned procedure. Current Outpatient Medications on File Prior to Visit   Medication Sig Dispense Refill    levothyroxine (SYNTHROID) 150 MCG tablet TAKE ONE TABLET BY MOUTH DAILY 30 tablet 1    PRILOSEC OTC 20 MG tablet TAKE ONE TABLET BY MOUTH DAILY 30 tablet 3     No current facility-administered medications on file prior to visit. Past Surgical History:   Procedure Laterality Date    LEEP      x3    TONSILLECTOMY           If you have any questions, please feel free to contact me.     Sincerely,        Dr. Alexander Villalobos MD

## 2020-05-01 NOTE — PROGRESS NOTES
ORTHOPEDIC PATIENT EVALUATION      HPI / Chief Complaint  Melinda Godoy is a 44 y.o. female who presents for evaluation of her right arm. On 4/26/2020 she indicates that she was tripped by her dog just outside of her home and she fell down some concrete steps onto a landing on her right arm. She had immediate pain and was seen at the emergency department in [place] where she was diagnosed with a right distal radius fracture as well as right greater tuberosity fracture. She was placed in a splint and in a sling. She was subsequently seen by Dr. Belinda Mcgregor who has referred her to my clinic for further evaluation and definitive treatment. Her pain at this time remains localized to her right wrist/hand as well as her shoulder. She reports having some tingling in all of her fingers. Past Medical History  Yajaira Son  has a past medical history of Abnormal Pap smear of cervix, GERD (gastroesophageal reflux disease), and Hypothyroidism. Past Surgical History  Yajaira Son  has a past surgical history that includes LEEP and Tonsillectomy. Current Medications  Current Outpatient Medications   Medication Sig Dispense Refill    levothyroxine (SYNTHROID) 150 MCG tablet TAKE ONE TABLET BY MOUTH DAILY 30 tablet 1    PRILOSEC OTC 20 MG tablet TAKE ONE TABLET BY MOUTH DAILY 30 tablet 3     No current facility-administered medications for this visit. Allergies  Allergies have been reviewed. Yajaira Son has No Known Allergies. Social History  Yajaira Son  reports that she quit smoking about 10 years ago. She has never used smokeless tobacco. She reports current alcohol use. She reports that she does not use drugs. Family History  Marleni's family history includes COPD in her father; Cancer in her mother; Colon Polyps in her brother; Kidney Disease in her father. Review of Systems   History obtained from the patient.    REVIEW OF SYSTEMS:   Constitution: negative for fever, chills, weight loss or malaise

## 2020-05-02 ENCOUNTER — HOSPITAL ENCOUNTER (OUTPATIENT)
Age: 50
Setting detail: SPECIMEN
Discharge: HOME | End: 2020-05-02
Payer: COMMERCIAL

## 2020-05-04 ENCOUNTER — ANESTHESIA EVENT (OUTPATIENT)
Dept: OPERATING ROOM | Age: 50
End: 2020-05-04
Payer: COMMERCIAL

## 2020-05-04 RX ORDER — SODIUM CHLORIDE 0.9 % (FLUSH) 0.9 %
10 SYRINGE (ML) INJECTION EVERY 12 HOURS SCHEDULED
Status: CANCELLED | OUTPATIENT
Start: 2020-05-04

## 2020-05-04 RX ORDER — SODIUM CHLORIDE 0.9 % (FLUSH) 0.9 %
10 SYRINGE (ML) INJECTION PRN
Status: CANCELLED | OUTPATIENT
Start: 2020-05-04

## 2020-05-04 RX ORDER — ACETAMINOPHEN 325 MG/1
1000 TABLET ORAL ONCE
Status: CANCELLED | OUTPATIENT
Start: 2020-05-04 | End: 2020-05-04

## 2020-05-05 ENCOUNTER — APPOINTMENT (OUTPATIENT)
Dept: GENERAL RADIOLOGY | Age: 50
End: 2020-05-05
Attending: ORTHOPAEDIC SURGERY
Payer: COMMERCIAL

## 2020-05-05 ENCOUNTER — HOSPITAL ENCOUNTER (OUTPATIENT)
Age: 50
Setting detail: OUTPATIENT SURGERY
Discharge: HOME | End: 2020-05-05
Attending: ORTHOPAEDIC SURGERY | Admitting: ORTHOPAEDIC SURGERY
Payer: COMMERCIAL

## 2020-05-05 ENCOUNTER — ANESTHESIA (OUTPATIENT)
Dept: OPERATING ROOM | Age: 50
End: 2020-05-05
Payer: COMMERCIAL

## 2020-05-05 VITALS
SYSTOLIC BLOOD PRESSURE: 118 MMHG | RESPIRATION RATE: 16 BRPM | OXYGEN SATURATION: 96 % | HEART RATE: 87 BPM | HEIGHT: 68 IN | DIASTOLIC BLOOD PRESSURE: 73 MMHG | TEMPERATURE: 97.9 F | WEIGHT: 200 LBS | BODY MASS INDEX: 30.31 KG/M2

## 2020-05-05 VITALS — TEMPERATURE: 94.5 F | SYSTOLIC BLOOD PRESSURE: 127 MMHG | OXYGEN SATURATION: 98 % | DIASTOLIC BLOOD PRESSURE: 70 MMHG

## 2020-05-05 LAB
-: NORMAL
HCG, PREGNANCY URINE (POC): NEGATIVE
SARS-COV-2, PCR: NOT DETECTED
SARS-COV-2, RAPID: NORMAL
SARS-COV-2: NORMAL
SOURCE: NORMAL

## 2020-05-05 PROCEDURE — C1713 ANCHOR/SCREW BN/BN,TIS/BN: HCPCS | Performed by: ORTHOPAEDIC SURGERY

## 2020-05-05 PROCEDURE — 64415 NJX AA&/STRD BRCH PLXS IMG: CPT | Performed by: ANESTHESIOLOGY

## 2020-05-05 PROCEDURE — 7100000030 HC ASPR PHASE II RECOVERY - FIRST 15 MIN: Performed by: ORTHOPAEDIC SURGERY

## 2020-05-05 PROCEDURE — 6370000000 HC RX 637 (ALT 250 FOR IP): Performed by: ANESTHESIOLOGY

## 2020-05-05 PROCEDURE — 3700000000 HC ANESTHESIA ATTENDED CARE: Performed by: ORTHOPAEDIC SURGERY

## 2020-05-05 PROCEDURE — 23630 OPTX GR HMRL TBRS FX INT FIX: CPT | Performed by: ORTHOPAEDIC SURGERY

## 2020-05-05 PROCEDURE — 76000 FLUOROSCOPY <1 HR PHYS/QHP: CPT

## 2020-05-05 PROCEDURE — 3600000003 HC SURGERY LEVEL 3 BASE: Performed by: ORTHOPAEDIC SURGERY

## 2020-05-05 PROCEDURE — 2500000003 HC RX 250 WO HCPCS: Performed by: ANESTHESIOLOGY

## 2020-05-05 PROCEDURE — 6360000002 HC RX W HCPCS: Performed by: NURSE ANESTHETIST, CERTIFIED REGISTERED

## 2020-05-05 PROCEDURE — 73060 X-RAY EXAM OF HUMERUS: CPT

## 2020-05-05 PROCEDURE — 6360000002 HC RX W HCPCS: Performed by: ANESTHESIOLOGY

## 2020-05-05 PROCEDURE — 7100000011 HC PHASE II RECOVERY - ADDTL 15 MIN: Performed by: ORTHOPAEDIC SURGERY

## 2020-05-05 PROCEDURE — 7100000010 HC PHASE II RECOVERY - FIRST 15 MIN: Performed by: ORTHOPAEDIC SURGERY

## 2020-05-05 PROCEDURE — 2580000003 HC RX 258: Performed by: NURSE ANESTHETIST, CERTIFIED REGISTERED

## 2020-05-05 PROCEDURE — 25608 OPTX DST RD XART FX/EPI SEP2: CPT | Performed by: ORTHOPAEDIC SURGERY

## 2020-05-05 PROCEDURE — 3700000001 HC ADD 15 MINUTES (ANESTHESIA): Performed by: ORTHOPAEDIC SURGERY

## 2020-05-05 PROCEDURE — 3600000013 HC SURGERY LEVEL 3 ADDTL 15MIN: Performed by: ORTHOPAEDIC SURGERY

## 2020-05-05 PROCEDURE — 6360000002 HC RX W HCPCS: Performed by: ORTHOPAEDIC SURGERY

## 2020-05-05 PROCEDURE — 7100000001 HC PACU RECOVERY - ADDTL 15 MIN: Performed by: ORTHOPAEDIC SURGERY

## 2020-05-05 PROCEDURE — 6370000000 HC RX 637 (ALT 250 FOR IP): Performed by: ORTHOPAEDIC SURGERY

## 2020-05-05 PROCEDURE — 2580000003 HC RX 258: Performed by: ANESTHESIOLOGY

## 2020-05-05 PROCEDURE — 73110 X-RAY EXAM OF WRIST: CPT

## 2020-05-05 PROCEDURE — 2500000003 HC RX 250 WO HCPCS: Performed by: NURSE ANESTHETIST, CERTIFIED REGISTERED

## 2020-05-05 PROCEDURE — 7100000031 HC ASPR PHASE II RECOVERY - ADDTL 15 MIN: Performed by: ORTHOPAEDIC SURGERY

## 2020-05-05 PROCEDURE — 2720000010 HC SURG SUPPLY STERILE: Performed by: ORTHOPAEDIC SURGERY

## 2020-05-05 PROCEDURE — 2709999900 HC NON-CHARGEABLE SUPPLY: Performed by: ORTHOPAEDIC SURGERY

## 2020-05-05 PROCEDURE — 81025 URINE PREGNANCY TEST: CPT

## 2020-05-05 PROCEDURE — 7100000000 HC PACU RECOVERY - FIRST 15 MIN: Performed by: ORTHOPAEDIC SURGERY

## 2020-05-05 DEVICE — SCREW BNE L12MM DIA2.7MM CORT S STL ST T8 STARDRV RECESS: Type: IMPLANTABLE DEVICE | Site: HUMERUS | Status: FUNCTIONAL

## 2020-05-05 DEVICE — SCREW BNE L18MM DIA2.4MM CORT S STL ST T8 STARDRV RECESS: Type: IMPLANTABLE DEVICE | Site: WRIST | Status: FUNCTIONAL

## 2020-05-05 DEVICE — PLATE BNE W22XL54MM STD 9 H R DST RAD VOLAR S STL VAR ANG: Type: IMPLANTABLE DEVICE | Site: WRIST | Status: FUNCTIONAL

## 2020-05-05 DEVICE — ANCHOR SUT L24.5MM DIA4.75MM BIOCOMPOSITE SELF PUNCHING: Type: IMPLANTABLE DEVICE | Site: HUMERUS | Status: FUNCTIONAL

## 2020-05-05 DEVICE — SCREW BNE L18MM DIA2.4MM DST RAD VOLAR S STL ST VAR ANG LOK: Type: IMPLANTABLE DEVICE | Site: WRIST | Status: FUNCTIONAL

## 2020-05-05 DEVICE — SCREW BNE L14MM DIA2.7MM CORT S STL ST T8 STARDRV RECESS: Type: IMPLANTABLE DEVICE | Site: HUMERUS | Status: FUNCTIONAL

## 2020-05-05 DEVICE — ANCHOR SUT L14.7MM DIA5.5MM BIOCOMPOSITE FULL THRD W/ 1.3MM: Type: IMPLANTABLE DEVICE | Site: HUMERUS | Status: FUNCTIONAL

## 2020-05-05 RX ORDER — GLYCOPYRROLATE 1 MG/5 ML
SYRINGE (ML) INTRAVENOUS PRN
Status: DISCONTINUED | OUTPATIENT
Start: 2020-05-05 | End: 2020-05-05 | Stop reason: SDUPTHER

## 2020-05-05 RX ORDER — LIDOCAINE HYDROCHLORIDE 10 MG/ML
INJECTION, SOLUTION EPIDURAL; INFILTRATION; INTRACAUDAL; PERINEURAL PRN
Status: DISCONTINUED | OUTPATIENT
Start: 2020-05-05 | End: 2020-05-05 | Stop reason: SDUPTHER

## 2020-05-05 RX ORDER — PROPOFOL 10 MG/ML
INJECTION, EMULSION INTRAVENOUS PRN
Status: DISCONTINUED | OUTPATIENT
Start: 2020-05-05 | End: 2020-05-05 | Stop reason: SDUPTHER

## 2020-05-05 RX ORDER — SCOLOPAMINE TRANSDERMAL SYSTEM 1 MG/1
1 PATCH, EXTENDED RELEASE TRANSDERMAL ONCE
Status: DISCONTINUED | OUTPATIENT
Start: 2020-05-05 | End: 2020-05-05 | Stop reason: HOSPADM

## 2020-05-05 RX ORDER — DEXAMETHASONE SODIUM PHOSPHATE 4 MG/ML
INJECTION, SOLUTION INTRA-ARTICULAR; INTRALESIONAL; INTRAMUSCULAR; INTRAVENOUS; SOFT TISSUE PRN
Status: DISCONTINUED | OUTPATIENT
Start: 2020-05-05 | End: 2020-05-05 | Stop reason: SDUPTHER

## 2020-05-05 RX ORDER — SODIUM CHLORIDE 0.9 % (FLUSH) 0.9 %
10 SYRINGE (ML) INJECTION PRN
Status: DISCONTINUED | OUTPATIENT
Start: 2020-05-05 | End: 2020-05-05 | Stop reason: HOSPADM

## 2020-05-05 RX ORDER — OXYCODONE HYDROCHLORIDE AND ACETAMINOPHEN 5; 325 MG/1; MG/1
1 TABLET ORAL PRN
Status: DISCONTINUED | OUTPATIENT
Start: 2020-05-05 | End: 2020-05-05 | Stop reason: HOSPADM

## 2020-05-05 RX ORDER — DIPHENHYDRAMINE HYDROCHLORIDE 50 MG/ML
12.5 INJECTION INTRAMUSCULAR; INTRAVENOUS
Status: DISCONTINUED | OUTPATIENT
Start: 2020-05-05 | End: 2020-05-05 | Stop reason: HOSPADM

## 2020-05-05 RX ORDER — ACETAMINOPHEN 500 MG
1000 TABLET ORAL ONCE
Status: COMPLETED | OUTPATIENT
Start: 2020-05-05 | End: 2020-05-05

## 2020-05-05 RX ORDER — ONDANSETRON 2 MG/ML
INJECTION INTRAMUSCULAR; INTRAVENOUS PRN
Status: DISCONTINUED | OUTPATIENT
Start: 2020-05-05 | End: 2020-05-05 | Stop reason: SDUPTHER

## 2020-05-05 RX ORDER — MIDAZOLAM HYDROCHLORIDE 1 MG/ML
INJECTION INTRAMUSCULAR; INTRAVENOUS PRN
Status: DISCONTINUED | OUTPATIENT
Start: 2020-05-05 | End: 2020-05-05 | Stop reason: SDUPTHER

## 2020-05-05 RX ORDER — SODIUM CHLORIDE 0.9 % (FLUSH) 0.9 %
10 SYRINGE (ML) INJECTION EVERY 12 HOURS SCHEDULED
Status: DISCONTINUED | OUTPATIENT
Start: 2020-05-05 | End: 2020-05-05 | Stop reason: HOSPADM

## 2020-05-05 RX ORDER — OXYCODONE HYDROCHLORIDE AND ACETAMINOPHEN 5; 325 MG/1; MG/1
2 TABLET ORAL PRN
Status: DISCONTINUED | OUTPATIENT
Start: 2020-05-05 | End: 2020-05-05 | Stop reason: HOSPADM

## 2020-05-05 RX ORDER — ROCURONIUM BROMIDE 10 MG/ML
INJECTION, SOLUTION INTRAVENOUS PRN
Status: DISCONTINUED | OUTPATIENT
Start: 2020-05-05 | End: 2020-05-05 | Stop reason: SDUPTHER

## 2020-05-05 RX ORDER — FENTANYL CITRATE 50 UG/ML
INJECTION, SOLUTION INTRAMUSCULAR; INTRAVENOUS PRN
Status: DISCONTINUED | OUTPATIENT
Start: 2020-05-05 | End: 2020-05-05 | Stop reason: SDUPTHER

## 2020-05-05 RX ORDER — ONDANSETRON 2 MG/ML
4 INJECTION INTRAMUSCULAR; INTRAVENOUS
Status: DISCONTINUED | OUTPATIENT
Start: 2020-05-05 | End: 2020-05-05 | Stop reason: HOSPADM

## 2020-05-05 RX ORDER — SODIUM CHLORIDE, SODIUM LACTATE, POTASSIUM CHLORIDE, CALCIUM CHLORIDE 600; 310; 30; 20 MG/100ML; MG/100ML; MG/100ML; MG/100ML
INJECTION, SOLUTION INTRAVENOUS CONTINUOUS
Status: DISCONTINUED | OUTPATIENT
Start: 2020-05-05 | End: 2020-05-05 | Stop reason: HOSPADM

## 2020-05-05 RX ORDER — DEXAMETHASONE SODIUM PHOSPHATE 10 MG/ML
10 INJECTION, SOLUTION INTRAMUSCULAR; INTRAVENOUS ONCE
Status: DISCONTINUED | OUTPATIENT
Start: 2020-05-05 | End: 2020-05-05 | Stop reason: HOSPADM

## 2020-05-05 RX ORDER — LABETALOL 20 MG/4 ML (5 MG/ML) INTRAVENOUS SYRINGE
5 EVERY 10 MIN PRN
Status: DISCONTINUED | OUTPATIENT
Start: 2020-05-05 | End: 2020-05-05 | Stop reason: HOSPADM

## 2020-05-05 RX ORDER — GABAPENTIN 300 MG/1
300 CAPSULE ORAL NIGHTLY
Qty: 7 CAPSULE | Refills: 0 | Status: SHIPPED | OUTPATIENT
Start: 2020-05-05 | End: 2020-05-08

## 2020-05-05 RX ORDER — TRANEXAMIC ACID 100 MG/ML
INJECTION, SOLUTION INTRAVENOUS PRN
Status: DISCONTINUED | OUTPATIENT
Start: 2020-05-05 | End: 2020-05-05 | Stop reason: SDUPTHER

## 2020-05-05 RX ORDER — HYDROCODONE BITARTRATE AND ACETAMINOPHEN 7.5; 325 MG/1; MG/1
1 TABLET ORAL EVERY 4 HOURS PRN
Qty: 42 TABLET | Refills: 0 | Status: SHIPPED | OUTPATIENT
Start: 2020-05-05 | End: 2020-05-12

## 2020-05-05 RX ORDER — ROPIVACAINE HYDROCHLORIDE 5 MG/ML
INJECTION, SOLUTION EPIDURAL; INFILTRATION; PERINEURAL
Status: COMPLETED | OUTPATIENT
Start: 2020-05-05 | End: 2020-05-05

## 2020-05-05 RX ORDER — NEOSTIGMINE METHYLSULFATE 5 MG/5 ML
SYRINGE (ML) INTRAVENOUS PRN
Status: DISCONTINUED | OUTPATIENT
Start: 2020-05-05 | End: 2020-05-05 | Stop reason: SDUPTHER

## 2020-05-05 RX ADMIN — ROPIVACAINE HYDROCHLORIDE 15 ML: 5 INJECTION, SOLUTION EPIDURAL; INFILTRATION; PERINEURAL at 07:28

## 2020-05-05 RX ADMIN — SODIUM CHLORIDE, POTASSIUM CHLORIDE, SODIUM LACTATE AND CALCIUM CHLORIDE: 600; 310; 30; 20 INJECTION, SOLUTION INTRAVENOUS at 09:10

## 2020-05-05 RX ADMIN — TRANEXAMIC ACID 1000 MG: 100 INJECTION, SOLUTION INTRAVENOUS at 07:56

## 2020-05-05 RX ADMIN — CEFAZOLIN 2 G: 10 INJECTION, POWDER, FOR SOLUTION INTRAVENOUS at 11:42

## 2020-05-05 RX ADMIN — PHENYLEPHRINE HYDROCHLORIDE 100 MCG: 10 INJECTION INTRAVENOUS at 08:28

## 2020-05-05 RX ADMIN — ONDANSETRON 4 MG: 2 INJECTION INTRAMUSCULAR; INTRAVENOUS at 11:44

## 2020-05-05 RX ADMIN — LIDOCAINE HYDROCHLORIDE 50 MG: 10 INJECTION, SOLUTION EPIDURAL; INFILTRATION; INTRACAUDAL; PERINEURAL at 07:35

## 2020-05-05 RX ADMIN — PHENYLEPHRINE HYDROCHLORIDE 50 MCG/MIN: 10 INJECTION, SOLUTION INTRAMUSCULAR; INTRAVENOUS; SUBCUTANEOUS at 08:24

## 2020-05-05 RX ADMIN — PROPOFOL 200 MG: 10 INJECTION, EMULSION INTRAVENOUS at 07:35

## 2020-05-05 RX ADMIN — DEXAMETHASONE SODIUM PHOSPHATE 10 MG: 4 INJECTION, SOLUTION INTRA-ARTICULAR; INTRALESIONAL; INTRAMUSCULAR; INTRAVENOUS; SOFT TISSUE at 07:42

## 2020-05-05 RX ADMIN — PHENYLEPHRINE HYDROCHLORIDE 200 MCG: 10 INJECTION INTRAVENOUS at 07:40

## 2020-05-05 RX ADMIN — ROCURONIUM BROMIDE 20 MG: 10 INJECTION, SOLUTION INTRAVENOUS at 08:47

## 2020-05-05 RX ADMIN — PHENYLEPHRINE HYDROCHLORIDE 100 MCG: 10 INJECTION INTRAVENOUS at 08:06

## 2020-05-05 RX ADMIN — ACETAMINOPHEN 1000 MG: 500 TABLET, FILM COATED ORAL at 07:02

## 2020-05-05 RX ADMIN — PHENYLEPHRINE HYDROCHLORIDE 200 MCG: 10 INJECTION INTRAVENOUS at 07:46

## 2020-05-05 RX ADMIN — SODIUM CHLORIDE, POTASSIUM CHLORIDE, SODIUM LACTATE AND CALCIUM CHLORIDE: 600; 310; 30; 20 INJECTION, SOLUTION INTRAVENOUS at 07:02

## 2020-05-05 RX ADMIN — PHENYLEPHRINE HYDROCHLORIDE 200 MCG: 10 INJECTION INTRAVENOUS at 07:58

## 2020-05-05 RX ADMIN — MIDAZOLAM 2 MG: 1 INJECTION INTRAMUSCULAR; INTRAVENOUS at 07:18

## 2020-05-05 RX ADMIN — FENTANYL CITRATE 100 MCG: 50 INJECTION, SOLUTION INTRAMUSCULAR; INTRAVENOUS at 07:18

## 2020-05-05 RX ADMIN — Medication 2 MG: at 11:57

## 2020-05-05 RX ADMIN — CEFAZOLIN 2 G: 10 INJECTION, POWDER, FOR SOLUTION INTRAVENOUS at 07:42

## 2020-05-05 RX ADMIN — ROCURONIUM BROMIDE 10 MG: 10 INJECTION, SOLUTION INTRAVENOUS at 10:14

## 2020-05-05 RX ADMIN — PHENYLEPHRINE HYDROCHLORIDE 100 MCG: 10 INJECTION INTRAVENOUS at 08:36

## 2020-05-05 RX ADMIN — Medication 0.4 MG: at 11:57

## 2020-05-05 RX ADMIN — ROCURONIUM BROMIDE 20 MG: 10 INJECTION, SOLUTION INTRAVENOUS at 09:33

## 2020-05-05 RX ADMIN — ROCURONIUM BROMIDE 50 MG: 10 INJECTION, SOLUTION INTRAVENOUS at 07:35

## 2020-05-05 RX ADMIN — PHENYLEPHRINE HYDROCHLORIDE 100 MCG: 10 INJECTION INTRAVENOUS at 08:16

## 2020-05-05 RX ADMIN — FAMOTIDINE 20 MG: 10 INJECTION INTRAVENOUS at 07:14

## 2020-05-05 ASSESSMENT — PULMONARY FUNCTION TESTS
PIF_VALUE: 17
PIF_VALUE: 18
PIF_VALUE: 18
PIF_VALUE: 17
PIF_VALUE: 19
PIF_VALUE: 18
PIF_VALUE: 17
PIF_VALUE: 18
PIF_VALUE: 17
PIF_VALUE: 17
PIF_VALUE: 18
PIF_VALUE: 17
PIF_VALUE: 18
PIF_VALUE: 17
PIF_VALUE: 17
PIF_VALUE: 18
PIF_VALUE: 16
PIF_VALUE: 15
PIF_VALUE: 17
PIF_VALUE: 16
PIF_VALUE: 17
PIF_VALUE: 16
PIF_VALUE: 16
PIF_VALUE: 18
PIF_VALUE: 17
PIF_VALUE: 19
PIF_VALUE: 16
PIF_VALUE: 17
PIF_VALUE: 17
PIF_VALUE: 15
PIF_VALUE: 18
PIF_VALUE: 17
PIF_VALUE: 18
PIF_VALUE: 17
PIF_VALUE: 16
PIF_VALUE: 17
PIF_VALUE: 18
PIF_VALUE: 17
PIF_VALUE: 16
PIF_VALUE: 17
PIF_VALUE: 2
PIF_VALUE: 17
PIF_VALUE: 16
PIF_VALUE: 18
PIF_VALUE: 16
PIF_VALUE: 18
PIF_VALUE: 18
PIF_VALUE: 16
PIF_VALUE: 16
PIF_VALUE: 17
PIF_VALUE: 18
PIF_VALUE: 16
PIF_VALUE: 18
PIF_VALUE: 17
PIF_VALUE: 18
PIF_VALUE: 17
PIF_VALUE: 16
PIF_VALUE: 16
PIF_VALUE: 18
PIF_VALUE: 17
PIF_VALUE: 17
PIF_VALUE: 2
PIF_VALUE: 18
PIF_VALUE: 17
PIF_VALUE: 18
PIF_VALUE: 19
PIF_VALUE: 17
PIF_VALUE: 17
PIF_VALUE: 18
PIF_VALUE: 17
PIF_VALUE: 18
PIF_VALUE: 16
PIF_VALUE: 17
PIF_VALUE: 18
PIF_VALUE: 16
PIF_VALUE: 17
PIF_VALUE: 18
PIF_VALUE: 17
PIF_VALUE: 17
PIF_VALUE: 18
PIF_VALUE: 17
PIF_VALUE: 18
PIF_VALUE: 17
PIF_VALUE: 17
PIF_VALUE: 16
PIF_VALUE: 18
PIF_VALUE: 16
PIF_VALUE: 17
PIF_VALUE: 16
PIF_VALUE: 17
PIF_VALUE: 18
PIF_VALUE: 3
PIF_VALUE: 17
PIF_VALUE: 18
PIF_VALUE: 17
PIF_VALUE: 16
PIF_VALUE: 19
PIF_VALUE: 18
PIF_VALUE: 18
PIF_VALUE: 16
PIF_VALUE: 17
PIF_VALUE: 15
PIF_VALUE: 17
PIF_VALUE: 16
PIF_VALUE: 19
PIF_VALUE: 16
PIF_VALUE: 4
PIF_VALUE: 18
PIF_VALUE: 17
PIF_VALUE: 18
PIF_VALUE: 17
PIF_VALUE: 18
PIF_VALUE: 16
PIF_VALUE: 17
PIF_VALUE: 18
PIF_VALUE: 17
PIF_VALUE: 18
PIF_VALUE: 16
PIF_VALUE: 17
PIF_VALUE: 16
PIF_VALUE: 17
PIF_VALUE: 17
PIF_VALUE: 18
PIF_VALUE: 17
PIF_VALUE: 1
PIF_VALUE: 18
PIF_VALUE: 17
PIF_VALUE: 18
PIF_VALUE: 17
PIF_VALUE: 17
PIF_VALUE: 16
PIF_VALUE: 18
PIF_VALUE: 17
PIF_VALUE: 18
PIF_VALUE: 19
PIF_VALUE: 17
PIF_VALUE: 18
PIF_VALUE: 17
PIF_VALUE: 17
PIF_VALUE: 16
PIF_VALUE: 17
PIF_VALUE: 16
PIF_VALUE: 2
PIF_VALUE: 18
PIF_VALUE: 17
PIF_VALUE: 18
PIF_VALUE: 16
PIF_VALUE: 17
PIF_VALUE: 15
PIF_VALUE: 16
PIF_VALUE: 16
PIF_VALUE: 17
PIF_VALUE: 16
PIF_VALUE: 18
PIF_VALUE: 16
PIF_VALUE: 18
PIF_VALUE: 18
PIF_VALUE: 16
PIF_VALUE: 18
PIF_VALUE: 17
PIF_VALUE: 18
PIF_VALUE: 16
PIF_VALUE: 17
PIF_VALUE: 18
PIF_VALUE: 17
PIF_VALUE: 17
PIF_VALUE: 18
PIF_VALUE: 15
PIF_VALUE: 3
PIF_VALUE: 16
PIF_VALUE: 18
PIF_VALUE: 17
PIF_VALUE: 18
PIF_VALUE: 16
PIF_VALUE: 18
PIF_VALUE: 15
PIF_VALUE: 17
PIF_VALUE: 18
PIF_VALUE: 18
PIF_VALUE: 17
PIF_VALUE: 16
PIF_VALUE: 16
PIF_VALUE: 17
PIF_VALUE: 18
PIF_VALUE: 18
PIF_VALUE: 17
PIF_VALUE: 16
PIF_VALUE: 17
PIF_VALUE: 17
PIF_VALUE: 18
PIF_VALUE: 17
PIF_VALUE: 16
PIF_VALUE: 16
PIF_VALUE: 18
PIF_VALUE: 17
PIF_VALUE: 18
PIF_VALUE: 18
PIF_VALUE: 16
PIF_VALUE: 16
PIF_VALUE: 18
PIF_VALUE: 17
PIF_VALUE: 16
PIF_VALUE: 1
PIF_VALUE: 16
PIF_VALUE: 18
PIF_VALUE: 17
PIF_VALUE: 17
PIF_VALUE: 18
PIF_VALUE: 16
PIF_VALUE: 18
PIF_VALUE: 17
PIF_VALUE: 18
PIF_VALUE: 18
PIF_VALUE: 17
PIF_VALUE: 18
PIF_VALUE: 18
PIF_VALUE: 17
PIF_VALUE: 17
PIF_VALUE: 18
PIF_VALUE: 18
PIF_VALUE: 16
PIF_VALUE: 17
PIF_VALUE: 18
PIF_VALUE: 17
PIF_VALUE: 10
PIF_VALUE: 17
PIF_VALUE: 17

## 2020-05-05 ASSESSMENT — PAIN SCALES - GENERAL
PAINLEVEL_OUTOF10: 0
PAINLEVEL_OUTOF10: 3

## 2020-05-05 ASSESSMENT — PAIN - FUNCTIONAL ASSESSMENT: PAIN_FUNCTIONAL_ASSESSMENT: 0-10

## 2020-05-05 ASSESSMENT — PAIN DESCRIPTION - DESCRIPTORS: DESCRIPTORS: ACHING

## 2020-05-05 NOTE — ANESTHESIA PRE PROCEDURE
Department of Anesthesiology  Preprocedure Note       Name:  Arash Quiroga   Age:  44 y.o.  :  1970                                          MRN:  [id number]         Date:  2020      Surgeon: Lanny Pena):  Yenifer Burger MD    Procedure: WRIST OPEN REDUCTION INTERNAL FIXATION (Right Wrist)  HUMERUS OPEN REDUCTION INTERNAL FIXATION (Right Arm Upper)    Medications prior to admission:   Prior to Admission medications    Medication Sig Start Date End Date Taking?  Authorizing Provider   levothyroxine (SYNTHROID) 150 MCG tablet TAKE ONE TABLET BY MOUTH DAILY 3/17/20   Lauren Reynoso MD   PRILOSEC OTC 20 MG tablet TAKE ONE TABLET BY MOUTH DAILY 19   Lauren Reynoso MD       Current medications:    Current Facility-Administered Medications   Medication Dose Route Frequency Provider Last Rate Last Dose    acetaminophen (TYLENOL) tablet 1,000 mg  1,000 mg Oral Once Yenifer Burger MD        ceFAZolin (ANCEF) 2 g in dextrose 5 % 50 mL IVPB  2 g Intravenous On Call to [address] [place] [address], MD        dexamethasone (PF) (DECADRON) injection 10 mg  10 mg Intravenous Once Yenifer Burger MD        sodium chloride flush 0.9 % injection 10 mL  10 mL Intravenous 2 times per day Yenifer Burger MD        sodium chloride flush 0.9 % injection 10 mL  10 mL Intravenous PRN Yenifer Burger MD        lactated ringers infusion   Intravenous Continuous Jaquelin Macario MD           Allergies:  No Known Allergies    Problem List:    Patient Active Problem List   Diagnosis Code    Hypothyroidism due to Hashimoto's thyroiditis E03.8, E06.3    Closed fracture of right distal radius S52.501A    Closed displaced fracture of greater tuberosity of right humerus C35.794C       Past Medical History:        Diagnosis Date    Abnormal Pap smear of cervix     GERD (gastroesophageal reflux disease)     Hypothyroidism        Past Surgical History:        Procedure Laterality Date    LEEP      x3    TONSILLECTOMY         Social History: Social History     Tobacco Use    Smoking status: Former Smoker     Last attempt to quit: 2/24/2010     Years since quitting: 10.2    Smokeless tobacco: Never Used   Substance Use Topics    Alcohol use: Yes     Comment: rarely                                Counseling given: Not Answered      Vital Signs (Current): There were no vitals filed for this visit. BP Readings from Last 3 Encounters:   04/26/20 (!) 129/91   12/26/19 118/84   12/06/19 132/84       NPO Status:                                                                                 BMI:   Wt Readings from Last 3 Encounters:   05/01/20 200 lb (90.7 kg)   04/28/20 199 lb 15.3 oz (90.7 kg)   04/26/20 200 lb (90.7 kg)     There is no height or weight on file to calculate BMI.    CBC:   Lab Results   Component Value Date    WBC 6.6 04/30/2019    RBC 4.47 04/30/2019    HGB 13.8 04/30/2019    HCT 43.4 04/30/2019    MCV 97.1 04/30/2019    RDW 11.9 04/30/2019     04/30/2019       CMP:   Lab Results   Component Value Date     04/30/2019    K 4.9 04/30/2019     04/30/2019    CO2 22 04/30/2019    BUN 11 04/30/2019    CREATININE 0.71 04/30/2019    GFRAA >60 04/30/2019    LABGLOM >60 04/30/2019    GLUCOSE 90 04/30/2019    GLUCOSE 77 03/16/2012    PROT 7.8 04/30/2019    CALCIUM 9.3 04/30/2019    BILITOT 0.20 04/30/2019    ALKPHOS 86 04/30/2019    AST 20 04/30/2019    ALT 31 04/30/2019       POC Tests: No results for input(s): POCGLU, POCNA, POCK, POCCL, POCBUN, POCHEMO, POCHCT in the last 72 hours.     Coags: No results found for: PROTIME, INR, APTT    HCG (If Applicable): No results found for: PREGTESTUR, PREGSERUM, HCG, HCGQUANT     ABGs: No results found for: PHART, PO2ART, ZRP1WTX, DCU9RME, BEART, T8AUYGWN     Type & Screen (If Applicable):  No results found for: LABABO, [address]    Anesthesia Evaluation  Patient summary reviewed and Nursing notes reviewed   history of anesthetic complications:

## 2020-05-05 NOTE — H&P
end of right radius, initial encounter   TECHNOLOGIST PROVIDED HISTORY:   fracture, pre-operative planning   Is the patient pregnant?->No       Right wrist plain radiographs from 04/26/2020       FINDINGS:   Bones: Acute comminuted displaced intra-articular fracture through the   metaphysis of the distal radius.       There is volar apex angulation and up to 1.6 cm of dorsal displacement of the   distal radius fracture component on image 36, series 401.       Suspected nondisplaced dorsal triquetral fracture on image 41, series 401.       Acute slightly displaced ulnar styloid avulsion fracture.       Soft Tissue:  Moderate soft tissue swelling and edema in the soft tissues   about the distal radius, wrist, and hand.       Joint:  Mild diffuse intercarpal joint space narrowing.       Mild degenerative changes of the triscaphe joint and 1st CMC joint.       Scaphoid appears intact.  Subcortical cyst in the proximal scaphoid.           Impression   1. Acute comminuted displaced intra-articular fracture through the metaphysis   of the distal radius.  Volar apex angulation. 2. Suspected nondisplaced dorsal triquetral fracture. 3. Acute slightly displaced ulnar styloid avulsion fracture. 4. Moderate soft tissue swelling and edema in the soft tissues about the   distal radius, wrist and hand. 5. Mild osteoarthrosis.          PAST MEDICAL HISTORY     Past Medical History:   Diagnosis Date    Abnormal Pap smear of cervix     GERD (gastroesophageal reflux disease)     Hypothyroidism        SURGICAL HISTORY       Past Surgical History:   Procedure Laterality Date    CHOLECYSTECTOMY  05/2019    LEEP      x3    TONSILLECTOMY         FAMILY HISTORY       Family History   Problem Relation Age of Onset   Alfaro Cancer Mother         unknown    COPD Father     Kidney Disease Father     Colon Polyps Brother        SOCIAL HISTORY       Social History     Socioeconomic History    Marital status:      Spouse name: None    Number of children: 2    Years of education: None    Highest education level: None   Occupational History    Occupation:    Social Needs    Financial resource strain: None    Food insecurity     Worry: None     Inability: None    Transportation needs     Medical: None     Non-medical: None   Tobacco Use    Smoking status: Former Smoker     Last attempt to quit: 2/24/2010     Years since quitting: 10.2    Smokeless tobacco: Never Used   Substance and Sexual Activity    Alcohol use: Yes     Comment: rarely    Drug use: No    Sexual activity: Yes     Partners: Male   Lifestyle    Physical activity     Days per week: None     Minutes per session: None    Stress: None   Relationships    Social connections     Talks on phone: None     Gets together: None     Attends [employer] service: None     Active member of club or organization: None     Attends meetings of clubs or organizations: None     Relationship status: None    Intimate partner violence     Fear of current or ex partner: None     Emotionally abused: None     Physically abused: None     Forced sexual activity: None   Other Topics Concern    None   Social History Narrative    None        REVIEW OF SYSTEMS      No Known Allergies    No current facility-administered medications on file prior to encounter. Current Outpatient Medications on File Prior to Encounter   Medication Sig Dispense Refill    levothyroxine (SYNTHROID) 150 MCG tablet TAKE ONE TABLET BY MOUTH DAILY 30 tablet 1    PRILOSEC OTC 20 MG tablet TAKE ONE TABLET BY MOUTH DAILY 30 tablet 3     Negative except for what is mentioned in the HPI. GENERAL PHYSICAL EXAM     Vitals: /76   Pulse 74   Temp 98.6 °F (37 °C) (Oral)   Resp 16   Ht 5' 8\" (1.727 m)   Wt 200 lb (90.7 kg)   LMP 03/04/2020 (Approximate)   SpO2 99%   BMI 30.41 kg/m²  Body mass index is 30.41 kg/m².      GENERAL APPEARANCE:   Wagner Esparza is 44 y.o.,  female, mildly

## 2020-05-05 NOTE — ANESTHESIA PROCEDURE NOTES
Peripheral Block    Patient location during procedure: procedure area  Start time: 5/5/2020 7:25 AM  End time: 5/5/2020 7:29 AM  Staffing  Anesthesiologist: Sarah Lobato MD  Performed: anesthesiologist   Preanesthetic Checklist  Completed: patient identified, site marked, surgical consent, pre-op evaluation, timeout performed, IV checked, risks and benefits discussed, monitors and equipment checked, anesthesia consent given, oxygen available and patient being monitored  Peripheral Block  Patient position: supine  Prep: ChloraPrep  Patient monitoring: cardiac monitor, continuous pulse ox, frequent blood pressure checks and IV access  Block type: Brachial plexus  Laterality: right  Injection technique: single-shot  Procedures: ultrasound guided  Local infiltration: lidocaine  Infiltration strength: 1 %  Dose: 3 mL  Interscalene  Provider prep: mask and sterile gloves  Local infiltration: lidocaine  Needle  Needle type: short-bevel   Needle gauge: 21 G  Needle length: 8 cm  Needle localization: ultrasound guidance  Test dose: negative  Assessment  Injection assessment: negative aspiration for heme, no paresthesia on injection and local visualized surrounding nerve on ultrasound  Paresthesia pain: none  Slow fractionated injection: yes  Hemodynamics: stable  Additional Notes  2% Lidocaine with Epinephrine 1 in 200,000 injected with the Ropivacaine  Versed 2mg and Fentanyl 100ug given pre procedure  Medications Administered  Ropivacaine (NAROPIN) injection 0.5%, 15 mL  Reason for block: post-op pain management and at surgeon's request

## 2020-05-05 NOTE — OP NOTE
OPERATIVE REPORT    Date of Surgery: 5/5/2020    Pre-operative Diagnosis:   1. Right greater tuberosity fracture  2. Right Distal radius intra-articular fracture    Post-operative Diagnosis:   1. Right greater tuberosity fracture  2. Right Distal radius intra-articular fracture  3. Right rotator cuff tear    Procedure:   1. Right greater tuberosity fracture ORIF  2. Right distal radius ORIF  3. Right RTC repair    Surgeon(s): Micki Lucas MD    Assistant(s): Julieta Serna DO (PGY 4)    Anesthesia: General with right interscalene nerve block    Fluids: See anesthesia record    Urine output: See anesthesia record    Estimated blood loss: 50 mL    Findings: Right shoulder with comminuted postero-superior greater tuberosity fracture and full-thickness retracted tear of the supraspinatus. Right distal radius intra-articular fracture with some dorsal comminution with intra-articular split through the scapholunate fossa junction    Specimen: None    Tourniquet time: 63 minutes    Implants:   Implant Name Type Inv.  Item Serial No.  Lot No. LRB No. Used Action   CORKSCREW 5.5MM BC FT W/SUTURETAPE Screw/Plate/Nail/William CORKSCREW 5.5MM BC FT W/SUTURETAPE  [employer] [id number] Right 2 Implanted   ANCHOR SWIVELOCK BIO SLF PUNCHING 4.75X24.5MM Fastener ANCHOR SWIVELOCK BIO SLF PUNCHING 4.75X24.5MM  [employer] [id number] Right 2 Implanted   CORKSCREW 5.5MM BC FT W/SUTURETAPE Screw/Plate/Nail/William CORKSCREW 5.5MM BC FT W/SUTURETAPE  [employer] [id number] Right 1 Implanted   SCREW CORTX SLFTP W/ T8 2.4X18MM Screw/Plate/Nail/William SCREW CORTX SLFTP W/ T8 2.4X18MM  [employer]  Right 1 Implanted   SCREW LK AVELINO ANGL 2.4X18MM Screw/Plate/Nail/William SCREW LK AVELINO ANGL 2.4X18MM  [employer]NTHES  Right 5 Implanted   PLATE DISTL RAD AVELINO ANGL RT 2.4X54MM Screw/Plate/Nail/William PLATE DISTL RAD AVELINO ANGL RT 2.4X54MM  SYNTHES  Right 1 Implanted       Surgical Indications:  Lavell Gonzales is a 44 y.o. old female who presented with a right displaced

## 2020-05-06 ENCOUNTER — TELEPHONE (OUTPATIENT)
Dept: PRIMARY CARE CLINIC | Age: 50
End: 2020-05-06

## 2020-05-08 RX ORDER — GABAPENTIN 300 MG/1
CAPSULE ORAL
Qty: 7 CAPSULE | Refills: 0 | Status: SHIPPED | OUTPATIENT
Start: 2020-05-08 | End: 2020-08-31

## 2020-05-18 ENCOUNTER — OFFICE VISIT (OUTPATIENT)
Dept: ORTHOPEDIC SURGERY | Age: 50
End: 2020-05-18

## 2020-05-18 VITALS — BODY MASS INDEX: 30.31 KG/M2 | WEIGHT: 200 LBS | HEIGHT: 68 IN

## 2020-05-18 PROCEDURE — 99024 POSTOP FOLLOW-UP VISIT: CPT | Performed by: ORTHOPAEDIC SURGERY

## 2020-05-18 NOTE — PROGRESS NOTES
Steri-Strips and clean dressings applied. She was placed and a right wrist removable splint. She may certainly take this off a few times a day to work on range of motion. With regards to her shoulder I will have her continue on with pendulum exercises for an additional 2 weeks and in 2 weeks time she will begin formal therapy working on both the shoulder and her wrist.  The focus initially would be on gentle progressive range of motion as tolerated. I will see her back for reevaluation in 4 weeks but she was encouraged to return or call earlier with any questions and/or concerns.

## 2020-06-01 ENCOUNTER — HOSPITAL ENCOUNTER (OUTPATIENT)
Dept: PHYSICAL THERAPY | Facility: CLINIC | Age: 50
Setting detail: THERAPIES SERIES
Discharge: HOME | End: 2020-06-01
Payer: COMMERCIAL

## 2020-06-01 PROCEDURE — 97110 THERAPEUTIC EXERCISES: CPT

## 2020-06-01 PROCEDURE — 97016 VASOPNEUMATIC DEVICE THERAPY: CPT

## 2020-06-01 PROCEDURE — 97162 PT EVAL MOD COMPLEX 30 MIN: CPT

## 2020-06-01 NOTE — CONSULTS
[facility] Fall Risk Assessment    Patient Name:  Peter Mendoza  : 1970        Risk Factor Scale  Score   History of Falls [] Yes  [x] No 25  0 0, 1x fall at home   Secondary Diagnosis [] Yes  [x] No 15  0 0   Ambulatory Aid [] Furniture  [] Crutches/cane/walker  [x] None/bedrest/wheelchair/nurse 30  15  0 0   IV/Heparin Lock [] Yes  [] No 20  0 0   Gait/Transferring [] Impaired  [] Weak  [x] Normal/bedrest/immobile 20  10  0 0   Mental Status [] Forgets limitations  [x] Oriented to own ability 15  0 0      Total:0     Based on the Assessment score: check the appropriate box.     [x]  No intervention needed   Low =   Score of 0-24    []  Use standard prevention interventions Moderate =  Score of 24-44   [] Give patient handout and discuss fall prevention strategies   [] Establish goal of education for patient/family RE: fall prevention strategies    []  Use high risk prevention interventions High = Score of 45 and higher   [] Give patient handout and discuss fall prevention strategies   [] Establish goal of education for patient/family Re: fall prevention strategies   [] Discuss lifeline / other resources    Electronically signed by:   Ino Client, PT  Date: 2020

## 2020-06-01 NOTE — CONSULTS
[] [facility]) [facility] &  Therapy  [address]  [phone]  F: [phone] [x] [address]  [facility]   [address]  P: [phone]  F: [phone] [] [address] &  Therapy  [address] [facility]  P: [phone]  F: [phone] [] [address]  [facility]   [address]   [place]: [phone]  F: [phone]      Physical Therapy Upper Extremity Evaluation    Date:  2020  Patient: Peter Mendoza  : 1970  MRN: [id number]  Physician: Dr. Cee Rita: Reji Henry (needs Dorothe Kelvin after eval)  Medical Diagnosis: closed displaced fracture of the greater tuberosity of right humerus, closed fracture distal end of right radius   Date of surgery 2020   Rehab Codes: M79.621, M79.641, M62.511, M62.531, M25.511, MM25.531  Onset Date:  2020   Next 's appt: 6-    Subjective:   CC: Patient reports pain and immobility in right wrist and shoulders. Enters PT clinic in sling for right shoulder and brace on right wrist    HPI:End of April, fall, broke right shoulder and right wrist.  Was heading out her back door, 4 steps, dog ran out, she fell onto concrete. Went to ER to [facility] in [place] [address]. At ER put in a hard brace (wrist) and sling for shoulder. X rays, tried to set the right wrist but was unsuccessful. Shoulder \"shattered and broke 2 bones in wrist\". Then saw a surgeon, CT Scan of shoulder, that surgeon referred her to Dr. [address]. Now 2 weeks post surgical, stitches removed. Given a removable splint and a sling. Wrist brace staying on for 2 more weeks. Prior hx - right rotator cuff was repaired ( an old injury, not 100% successful in repairing)  Prior PT for right shoulder from arthritis and repetitive motion with hair styling.      No use of home modalities now, iced a lot after surgery, little pain now    Exercises - pendulums, closing fist, thumb to fingers, small wrist motion, difficulty with supination/pronation    PMHx: [] Unremarkable [] Diabetes [] HTN  [] Pacemaker   [] MI/Heart Problems [] Cancer [] Arthritis [] Other:              [x] Refer to full medical chart  In EPIC       Comorbidities:   [] Obesity [] Dialysis  [] Other:   [] Asthma/COPD [] Dementia [] Other:   [] Stroke [] Sleep apnea [] Other:   [] Vascular disease [] Rheumatic disease [] Other:     Tests: [x] X-Ray: [x] MRI:  [x] Other:CT Scan    Medications: [x] Refer to full medical record [] None [] Other:  Allergies:      [x] Refer to full medical record [] None [] Other:    Function:  Hand Dominance  [x] Right  [] Left  Patient lives with: , not driving   In what type of home []  One story   [x] Two story   [] Split level   Number of stairs to enter    With handrail on the []  Right to enter   [] Left to enter   [place] So has a []  Tub only  [] Tub/shower combo   [x] Walk in shower    []  Grab bars       Employer , 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-, self employed   Job Status []  Normal duty   [] Light duty   [x] Off due to condition       Work activities/duties Crafting, camping       ADL/IADL Previous level of function Current level of function Who currently assists the patient with task   Bathing  [x] Independent  [] Assist [x] Independent  [] Assist    Dress/grooming [x] Independent  [] Assist [x] Independent  [] Assist Can't button anything, using fingers a little   Transfer/mobility [x] Independent  [] Assist [x] Independent  [] Assist    Feeding [x] Independent  [] Assist [x] Independent  [] Assist    Toileting [x] Independent  [] Assist [x] Independent  [] Assist    Driving [x] Independent  [] Assist [] Independent  [x] Assist    Housekeeping [x] Independent  [] Assist [] Independent  [] Assist    Grocery shop/meal prep [x] Independent  [] Assist [] Independent  [x] Assist      Pain:  [x] Yes

## 2020-06-11 ENCOUNTER — HOSPITAL ENCOUNTER (OUTPATIENT)
Dept: PHYSICAL THERAPY | Facility: CLINIC | Age: 50
Setting detail: THERAPIES SERIES
Discharge: HOME | End: 2020-06-11
Payer: COMMERCIAL

## 2020-06-11 PROCEDURE — 97110 THERAPEUTIC EXERCISES: CPT

## 2020-06-11 PROCEDURE — 97016 VASOPNEUMATIC DEVICE THERAPY: CPT

## 2020-06-11 PROCEDURE — 97140 MANUAL THERAPY 1/> REGIONS: CPT

## 2020-06-11 NOTE — FLOWSHEET NOTE
[] [facility]) [facility] &  Therapy  [address]  [phone]  F: [phone] [x] [address]  [place] [address]   [address]  P: [phone]  F: [phone] [] [address]  Therapy  [address]  P: [phone]  F: [phone] [] [address]  [facility]   [address]   [place]: [phone]  F: [phone]      Physical Therapy Daily Treatment Note    Date:  2020  Patient Name:  Rubin Douglas    :  1970  MRN: [id number]  Physician: Dr. Tyler Luz: Rosie Singh (needs Daniel Barer after eval)  Medical Diagnosis: closed displaced fracture of the greater tuberosity of right humerus, closed fracture distal end of right radius         Date of surgery 2020          Rehab Codes: M79.621, M79.641, M62.511, M62.531, M25.511, MM25.531  Onset Date:  2020           Next 's appt: 6-  Visit# / total visits: 216     Cancels/No Shows: 0/0    Subjective:    Pain:  [x] Yes  [] No Location: R shoulder and R wrist Pain Rating: (0-10 scale) 3/10  Pain altered Tx:  [x] No  [] Yes  Action:  Comments: Pt reports she does not have much pain in her wrist, it is more in her shoulder. She also states her biggest issue is her swelling in her R UE. Pt to see Dr Bree Hernandez on Monday.      Objective:  Modalities: Game Ready to R shoulder on low pressure for 15'   Precautions: repair of old right supraspinatus tear (not a full repair)  Right greater tuberosity fx  ORIF to right wrist  2020 date of surgery  NO STRENGTHENING OF ROTATOR CUFF     RIGHT WRIST gentle progressive ROM and edema control  20 (6 weeks post op) can begin Wrist gentle progressive strengthening     Exercises:    Exercise Reps/ Time Weight/ Level Comments   STM, to right fingers     Distal to proximal for dressing, blow dryer, etc  3. Ability to fully close right hand and  20# or more of strength  4. No edema in right hand  5. Resume sleeping supine in her bed (vs. Recliner)  6. Resume driving  7. Ability to perform fine motor tasks like buttons, zippers  8.           Pt. Education:  [x] Yes  [] No  [x] Reviewed Prior HEP/Ed  Method of Education: [x] Verbal  [x] Demo  [] Written  Comprehension of Education:  [x] Verbalizes understanding. [x] Demonstrates understanding. [] Needs review. [x] Demonstrates/verbalizes HEP/Ed previously given. Plan: [x] Continue current frequency toward long and short term goals.     [] Specific Instructions for subsequent treatments:       Time In: 2:00pm           Time Out: 300pm    Electronically signed by:  Vik Novak PTA

## 2020-06-15 ENCOUNTER — OFFICE VISIT (OUTPATIENT)
Dept: ORTHOPEDIC SURGERY | Age: 50
End: 2020-06-15

## 2020-06-15 VITALS — WEIGHT: 200 LBS | HEIGHT: 67 IN | BODY MASS INDEX: 31.39 KG/M2

## 2020-06-15 PROCEDURE — 99024 POSTOP FOLLOW-UP VISIT: CPT | Performed by: ORTHOPAEDIC SURGERY

## 2020-06-15 NOTE — PROGRESS NOTES
Procedure: Right shoulder greater tuberosity fracture ORIF and rotator cuff repair. Right distal radius fracture ORIF  Date of procedure: 5/5/2020    HPI:  Luis Love is a 44 y.o. female who is approximately 6 weeks status post aforementioned procedure. Indicates that she is doing relatively well. She rates her pain as a 0/10. She denies having any fevers, chills, sweats or any constitutional symptoms. Therapy is going well for her. She does report struggling with her supination/pronation. Physical examination:  Evaluation of patient's right shoulder, wrist, and upper extremity demonstrates her incisions to be clean, dry, intact. There is no warmth, erythema, wound dehiscence or drainage. Sensation is grossly intact light touch in all dermatomes and she has a 2+ radial pulse with brisk capillary refill in her fingers. Imaging studies: 4 x-ray views of the patient's right shoulder completed on 6/15/2020 demonstrates glenohumeral joint to be well aligned without obvious dislocation or subluxation. Suture anchors noted in the greater tuberosity. Fracture fragment appears to be well approximated without interval displacement. 3 x-ray views of the right wrist completed on 6/15/2020 demonstrates her distal radius fracture to be in good alignment. No evidence of loosening or migration of her implant i.e. plates and screws. No significant callus formation noted at this time. Impression and plan:  Luis Love is a 44 y.o. female who is approximately 6 weeks status post an ORIF of her right greater tuberosity fracture and distal radius fracture. She is doing quite well at this time. Again I have her continue on in physical therapy working on restoring normal motion in her shoulder and wrist.  She can wean out of her sling and start using it for light ADLs. In physical therapy they will work with her on gradual progressive strengthening starting in 2 weeks.   I will see her back for

## 2020-06-16 ENCOUNTER — HOSPITAL ENCOUNTER (OUTPATIENT)
Dept: PHYSICAL THERAPY | Facility: CLINIC | Age: 50
Setting detail: THERAPIES SERIES
Discharge: HOME | End: 2020-06-16
Payer: COMMERCIAL

## 2020-06-16 PROCEDURE — 97016 VASOPNEUMATIC DEVICE THERAPY: CPT

## 2020-06-16 PROCEDURE — 97140 MANUAL THERAPY 1/> REGIONS: CPT

## 2020-06-16 PROCEDURE — 97110 THERAPEUTIC EXERCISES: CPT

## 2020-06-18 ENCOUNTER — HOSPITAL ENCOUNTER (OUTPATIENT)
Dept: PHYSICAL THERAPY | Facility: CLINIC | Age: 50
Setting detail: THERAPIES SERIES
Discharge: HOME | End: 2020-06-18
Payer: COMMERCIAL

## 2020-06-18 PROCEDURE — 97140 MANUAL THERAPY 1/> REGIONS: CPT

## 2020-06-18 PROCEDURE — 97110 THERAPEUTIC EXERCISES: CPT

## 2020-06-18 PROCEDURE — 97016 VASOPNEUMATIC DEVICE THERAPY: CPT

## 2020-06-18 NOTE — PROGRESS NOTES
of right UE.  2. ? ROM: PROM of right shoulder flexion and abduction to 90.  3. Functional AROM of right wrist flexion, extension, able to supinate to 45 degrees  4. Minimal edema in right hand  5. Patient to be independent with home exercise program as demonstrated by performance with correct form without cues. 6. Demonstrate Knowledge of fall prevention, met 6-1-2020 low fall risk per Whitney Wilson, no intervention needed  7.    LTG: (to be met in 16 treatments)  1. Functional AROM of right shoulder to raise arms for dressing and grooming. 2. -4/5 strength in right shoulder to allow her to hold up arms and use tools for hair dressing, blow dryer, etc  3. Ability to fully close right hand and  20# or more of strength  4. No edema in right hand  5. Resume sleeping supine in her bed (vs. Recliner)  6. Resume driving  7. Ability to perform fine motor tasks like buttons, zippers     Treatment Plan:  [x] Therapeutic Exercise   15949  [] Iontophoresis: 4 mg/mL Dexamethasone Sodium Phosphate  mAmin  54976   [x] Therapeutic Activity  84526 [x] Vasopneumatic cold with compression  07656    [] Gait Training   18822 [] Ultrasound   63016   [x] Neuromuscular Re-education  83354 [] Electrical Stimulation Unattended  16350   [x] Manual Therapy  06667 [] Electrical Stimulation Attended  82079   [x] Instruction in HEP  [] Lumbar/Cervical Traction  10608   [] Aquatic Therapy   21070 [] Cold/hotpack    [x] Massage   11270      [] Dry Needling, 1 or 2 muscles  31138   [] Biofeedback, first 15 minutes   53839  [] Biofeedback, additional 15 minutes   28996 [] Dry Needling, 3 or more muscles  56222       Patient Status:     [x] Continue per initial plan of care. Electronically signed by Sabrina Monique PT on 6/18/2020 at 11:35 AM      If you have any questions or concerns, please don't hesitate to call.   Thank you for your referral.    Physician Signature:________________________________Date:__________________  By signing above or cosigning this note, I have reviewed this plan of care and certify a need for medically necessary rehabilitation services.      *PLEASE SIGN ABOVE AND FAX BACK ALL PAGES*

## 2020-06-18 NOTE — FLOWSHEET NOTE
[] [facility]) [facility] &  Therapy  [address]  [phone]  F: [phone] [x] [address]  [place] [address]   [address]  P: [phone]  F: [phone] [] [address]  Therapy  [address] [facility]  P: [phone]  F: [phone] [] [address]  [address] [facility]   [address]   [place]: [phone]  F: [phone]      Physical Therapy Daily Treatment Note    Date:  2020  Patient Name:  Luna Bocanegra    :  1970  MRN: [id number]  Physician: Dr. Aries Mccollum: Heaven Santos (authorized 18 visits from 2020 to 2020)  Medical Diagnosis: closed displaced fracture of the greater tuberosity of right humerus, closed fracture distal end of right radius         Date of surgery 2020          Rehab Codes: M79.621, M79.641, M62.511, M62.531, M25.511, MM25.531  Onset Date:  2020           Next 's appt: 6-  Visit# / total visits:      Cancels/No Shows: 0/0    Subjective:    Pain:  [x] Yes  [] No Location: R shoulder and R wrist Pain Rating: (0-10 scale) 3/10  Pain altered Tx:  [x] No  [] Yes  Action:  Comments: Pt reports kinesiotape was on till last night, then put on sleeve and slept with sleeve on, decreased edema today    Objective:    2020  Right shoulder AROM flexion 45  Right shoulder AROM Abd  40  ER     42  Wrist flexion    50  Wrist ext    9  Supination    74  Pronation    5  Modalities: Game Ready to R shoulder on low pressure for 15'   Precautions: repair of old right supraspinatus tear (not a full repair)  Right greater tuberosity fx  ORIF to right wrist  2020 date of surgery  NO STRENGTHENING OF ROTATOR CUFF     RIGHT WRIST gentle progressive ROM and edema control  20 (6 weeks post op) can begin Wrist gentle progressive strengthening Per recent 6/15/2020 MD note    Exercises:    Treatment order  Ex, vaso, taping    Exercise Reps/ Time Weight/ Level Comments   STM, to right fingers  x   Distal to proximal for edema reduction, AROM of each joint in fingers, slowly closing in to palm   STM to right wrist  x   Gentle massage to decrease fluid, gentle PROM flex and extension of right wrist   STM to R lower/upper arm x  Distal to proximal massage to help decrease edema in R UE            Right shoulder x   Sitting PROM to right shoulder, flex, abduction   Scapula squeezes 10x                                     Other:     KT Tape for edema in arm. 4 total fan strips. Pt educated on wear and removal.     Specific Instructions for next treatment: monitor response to above, monitor edema in right hand, postural education    Treatment Charges: Mins Units   []  Modalities     [x]  Ther Exercise 15 1   [x]  Manual Therapy 20 1   []  Ther Activities     []  Aquatics     [x]  Vasocompression 15 1   []  Other     Total Treatment time 50 3       Assessment: [x] Progressing toward goals. Continued with STM, started at proximal humerus, then fingers to axilla to open up channels first for edema in upper arm, then pump distal to proximal from fingertips. Improved ranges as charted above, shoulder flexion, abduction and wrist extension most limited. Wearing wrist brace less often using tubigrip on right arm for light compression to assist with edema. Minimal tenderness along incision at shoulder. 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 [] No change.      [] Other:  [x] Patient would continue to benefit from skilled physical therapy services in order to: reduce edema and increase ROM in R UE.     STG: (to be met in 8 treatments)  1. ? Pain: 2/10 or less as she initiates more use of right UE.  2. ? ROM: PROM of right shoulder flexion and abduction to 90.  3. Functional AROM of right wrist flexion, extension,

## 2020-06-19 ENCOUNTER — APPOINTMENT (OUTPATIENT)
Dept: PHYSICAL THERAPY | Facility: CLINIC | Age: 50
End: 2020-06-19
Payer: COMMERCIAL

## 2020-06-22 ENCOUNTER — HOSPITAL ENCOUNTER (OUTPATIENT)
Dept: PHYSICAL THERAPY | Facility: CLINIC | Age: 50
Setting detail: THERAPIES SERIES
Discharge: HOME | End: 2020-06-22
Payer: COMMERCIAL

## 2020-06-22 PROCEDURE — 97016 VASOPNEUMATIC DEVICE THERAPY: CPT

## 2020-06-22 PROCEDURE — 97140 MANUAL THERAPY 1/> REGIONS: CPT

## 2020-06-22 PROCEDURE — 97110 THERAPEUTIC EXERCISES: CPT

## 2020-06-26 ENCOUNTER — HOSPITAL ENCOUNTER (OUTPATIENT)
Dept: PHYSICAL THERAPY | Facility: CLINIC | Age: 50
Setting detail: THERAPIES SERIES
Discharge: HOME | End: 2020-06-26
Payer: COMMERCIAL

## 2020-06-26 PROCEDURE — 97110 THERAPEUTIC EXERCISES: CPT

## 2020-06-26 PROCEDURE — 97016 VASOPNEUMATIC DEVICE THERAPY: CPT

## 2020-06-26 PROCEDURE — 97140 MANUAL THERAPY 1/> REGIONS: CPT

## 2020-06-26 NOTE — FLOWSHEET NOTE
No change. [] Other:    [x] Patient would continue to benefit from skilled physical therapy services in order to: reduce edema and increase ROM in R UE. Progressed ROM program this visit with increased ROM noted. Continued progressions this date. Patient showing improved ROM. Plan to progress strength program next visit per protocol. STG: (to be met in 8 treatments)  1. ? Pain: 2/10 or less as she initiates more use of right UE.  2. ? ROM: PROM of right shoulder flexion and abduction to 90.  3. Functional AROM of right wrist flexion, extension, able to supinate to 45 degrees  4. Minimal edema in right hand  5. Patient to be independent with home exercise program as demonstrated by performance with correct form without cues. 6. Demonstrate Knowledge of fall prevention, met 6-1-2020 low fall risk per Rimma Gomez, no intervention needed  7.    LTG: (to be met in 16 treatments)  1. Functional AROM of right shoulder to raise arms for dressing and grooming. 2. -4/5 strength in right shoulder to allow her to hold up arms and use tools for hair dressing, blow dryer, etc  3. Ability to fully close right hand and  20# or more of strength  4. No edema in right hand  5. Resume sleeping supine in her bed (vs. Recliner)  6. Resume driving  7. Ability to perform fine motor tasks like buttons, zippers    Pt. Education:  [x] Yes  [] No  [] Reviewed Prior HEP/Ed  Method of Education: [x] Verbal  [] Demo  [] Written  Comprehension of Education:  [x] Verbalizes understanding. [] Demonstrates understanding. [] Needs review. [x] Demonstrates/verbalizes HEP/Ed previously given. Plan: [x] Continue current frequency toward long and short term goals.      [x] Specific Instructions for subsequent treatments: continue to focus on ROM       Time In: 1100           Time Out: 1200    Electronically signed by:  Chip Ortiz PTA

## 2020-06-29 ENCOUNTER — HOSPITAL ENCOUNTER (OUTPATIENT)
Dept: PHYSICAL THERAPY | Facility: CLINIC | Age: 50
Setting detail: THERAPIES SERIES
Discharge: HOME | End: 2020-06-29
Payer: COMMERCIAL

## 2020-06-29 PROCEDURE — 97110 THERAPEUTIC EXERCISES: CPT

## 2020-06-29 PROCEDURE — 97140 MANUAL THERAPY 1/> REGIONS: CPT

## 2020-06-29 PROCEDURE — 97016 VASOPNEUMATIC DEVICE THERAPY: CPT

## 2020-06-30 ENCOUNTER — NURSE ONLY (OUTPATIENT)
Dept: FAMILY MEDICINE CLINIC | Age: 50
End: 2020-06-30
Payer: COMMERCIAL

## 2020-06-30 ENCOUNTER — TELEPHONE (OUTPATIENT)
Dept: FAMILY MEDICINE CLINIC | Age: 50
End: 2020-06-30

## 2020-06-30 ENCOUNTER — HOSPITAL ENCOUNTER (OUTPATIENT)
Age: 50
Setting detail: SPECIMEN
Discharge: HOME | End: 2020-06-30
Payer: COMMERCIAL

## 2020-06-30 LAB
BILIRUBIN, POC: NORMAL
BLOOD URINE, POC: NORMAL
CLARITY, POC: CLEAR
COLOR, POC: YELLOW
GLUCOSE URINE, POC: NORMAL
KETONES, POC: NORMAL
LEUKOCYTE EST, POC: NORMAL
NITRITE, POC: NORMAL
PH, POC: 6.5
PROTEIN, POC: NORMAL
SPECIFIC GRAVITY, POC: 1.02
UROBILINOGEN, POC: 0.2

## 2020-06-30 PROCEDURE — 81003 URINALYSIS AUTO W/O SCOPE: CPT | Performed by: FAMILY MEDICINE

## 2020-06-30 RX ORDER — NITROFURANTOIN 25; 75 MG/1; MG/1
100 CAPSULE ORAL 2 TIMES DAILY
Qty: 10 CAPSULE | Refills: 0 | Status: SHIPPED | OUTPATIENT
Start: 2020-06-30 | End: 2020-07-05

## 2020-06-30 NOTE — TELEPHONE ENCOUNTER
Pt stated she has uti sx is requesting abx pt has been informed may need  nurse visit for ua pt has appointment at 1 pm today.

## 2020-07-01 ENCOUNTER — HOSPITAL ENCOUNTER (OUTPATIENT)
Dept: PHYSICAL THERAPY | Facility: CLINIC | Age: 50
Setting detail: THERAPIES SERIES
Discharge: HOME | End: 2020-07-01
Payer: COMMERCIAL

## 2020-07-01 LAB
CULTURE: NORMAL
Lab: NORMAL
SPECIMEN DESCRIPTION: NORMAL

## 2020-07-01 NOTE — FLOWSHEET NOTE
[x] [facility]  Outpatient Rehabilitation &  Therapy  [facility]   [place]: [phone]  F: [phone]      Physical Therapy Cancel/No Show note    Date: 2020  Patient: Johanne Manzano  : 1970  MRN: [id number]    Cancels/No Shows to date: 1    For today's appointment patient:    []  Cancelled    [] Rescheduled appointment    [x] No-show     Reason given by patient:    []  Patient ill    []  Conflicting appointment    [] No transportation      [] Conflict with work    [x] No reason given    [] Weather related    [] [id number]    [] Other:      Comments:  Pt did not show for appt today      [] Next appointment was confirmed    Electronically signed by: Pascual Ravi PT

## 2020-07-08 ENCOUNTER — HOSPITAL ENCOUNTER (OUTPATIENT)
Dept: PHYSICAL THERAPY | Facility: CLINIC | Age: 50
Setting detail: THERAPIES SERIES
Discharge: HOME | End: 2020-07-08
Payer: COMMERCIAL

## 2020-07-08 PROCEDURE — 97110 THERAPEUTIC EXERCISES: CPT

## 2020-07-08 PROCEDURE — 97140 MANUAL THERAPY 1/> REGIONS: CPT

## 2020-07-08 PROCEDURE — 97016 VASOPNEUMATIC DEVICE THERAPY: CPT

## 2020-07-08 NOTE — FLOWSHEET NOTE
[x] [facility]  Outpatient Rehabilitation &  Therapy  [facility]   [place]: [phone]  F: [phone]      Physical Therapy Daily Treatment Note    Date:  2020  Patient Name:  Rafi Hernadez    :  1970  MRN: [id number]  Physician: Dr. Wally Tapia: Saadia Santa (authorized 18 visits from 2020 to 2020)  Medical Diagnosis: closed displaced fracture of the greater tuberosity of right humerus, closed fracture distal end of right radius        Date of surgery 2020          Rehab Codes: M79.621, M79.641, M62.511, M62.531, M25.511, MM25.531  Onset Date:  2020          Next 's appt: 2020  Visit# / total visits:     Cancels/No Shows: 0/0    Subjective:    Pain:  [x] Yes  [] No Location: R shoulder and R wrist Pain Rating: (0-10 scale) 3/10  Pain altered Tx:  [x] No  [] Yes  Action:  Comments: Pt reports no pain at rest, feeling tightness in the shoulder and wrist with activity-slight pain. Objective:  Precautions: repair of old right supraspinatus tear (not a full repair)  Right greater tuberosity fx  ORIF to right wrist  2020 date of surgery  NO STRENGTHENING OF ROTATOR CUFF     RIGHT WRIST gentle progressive ROM and edema control  6-30-20 (6 weeks post op) can begin gentle progressive strengthening Per recent 6/15/2020 MD note    Exercises:    Exercise      RUE ORIF Reps/ Time Weight/ Level Comments   Pulleys 4'/4'           Table slides 10x10\"  Flex/Abd   Pronation/ supnation Stretch  3x30\"           Finger web  2x10 Yellow    4 way wrist  2x10 orange          Mat      SL ER x20     SL Abd x10     Supine Punches x10     Supine ABCs      Wand flex 2x10     Wand Abd x4    pain   Wand ER 2x10             TBand      Retraction 2x10 orange     Ext 2x10 orange    ER/IR 2x10 orange                    Other:   Manual PROM all planes to tolerance and limits.  Gentle joint mobility all planes     Gamready x15' mild pressure RUE

## 2020-07-10 ENCOUNTER — HOSPITAL ENCOUNTER (OUTPATIENT)
Dept: PHYSICAL THERAPY | Facility: CLINIC | Age: 50
Setting detail: THERAPIES SERIES
Discharge: HOME | End: 2020-07-10
Payer: COMMERCIAL

## 2020-07-10 PROCEDURE — 97110 THERAPEUTIC EXERCISES: CPT

## 2020-07-10 PROCEDURE — 97140 MANUAL THERAPY 1/> REGIONS: CPT

## 2020-07-10 PROCEDURE — 97016 VASOPNEUMATIC DEVICE THERAPY: CPT

## 2020-07-10 NOTE — FLOWSHEET NOTE
shoulder:  Flex 105/118  Abd 66/80  ER 22/30  IR 25/33    Specific Instructions for next treatment: Progress ROM as tolerated    Treatment Charges: Mins Units   []  Modalities     [x]  Ther Exercise 30 2   [x]  Manual Therapy 10 1   []  Ther Activities     []  Aquatics     [x]  Vasocompression 15 1   []  Other     Total Treatment time 60 4       Assessment: [x] Progressing toward goals. [] No change. [] Other:    [x] Patient would continue to benefit from skilled physical therapy services in order to: reduce edema and increase RUE ROM/strength. Continued fatigue noted with exercises this date. Painful end ranges noted. Will continue ROM progressions as tolerated. STG: (to be met in 8 treatments)  1. ? Pain: 2/10 or less as she initiates more use of right UE.  2. ? ROM: PROM of right shoulder flexion and abduction to 90.  3. Functional AROM of right wrist flexion, extension, able to supinate to 45 degrees  4. Minimal edema in right hand  5. Patient to be independent with home exercise program as demonstrated by performance with correct form without cues. 6. Demonstrate Knowledge of fall prevention, met 6-1-2020 low fall risk per Marlen Murry, no intervention needed    LTG: (to be met in 16 treatments)  1. Functional AROM of right shoulder to raise arms for dressing and grooming. 2. -4/5 strength in right shoulder to allow her to hold up arms and use tools for hair dressing, blow dryer, etc  3. Ability to fully close right hand and  20# or more of strength  4. No edema in right hand  5. Resume sleeping supine in her bed (vs. Recliner)  6. Resume driving  7. Ability to perform fine motor tasks like buttons, zippers    Pt. Education:  [x] Yes  [] No  [] Reviewed Prior HEP/Ed  Method of Education: [x] Verbal  [] Demo  [] Written  Comprehension of Education:  [x] Verbalizes understanding. [] Demonstrates understanding. [] Needs review. [x] Demonstrates/verbalizes HEP/Ed previously given.      Plan: [x] Continue current frequency toward long and short term goals.      [x] Specific Instructions for subsequent treatments: continue to focus on ROM       Time In: 1100           Time Out: 1210    Electronically signed by:  Mitali Bullock PTA

## 2020-07-13 ENCOUNTER — HOSPITAL ENCOUNTER (OUTPATIENT)
Dept: PHYSICAL THERAPY | Facility: CLINIC | Age: 50
Setting detail: THERAPIES SERIES
Discharge: HOME | End: 2020-07-13
Payer: COMMERCIAL

## 2020-07-13 PROCEDURE — 97016 VASOPNEUMATIC DEVICE THERAPY: CPT

## 2020-07-13 PROCEDURE — 97140 MANUAL THERAPY 1/> REGIONS: CPT

## 2020-07-13 PROCEDURE — 97110 THERAPEUTIC EXERCISES: CPT

## 2020-07-13 NOTE — FLOWSHEET NOTE
[x] [facility]  Outpatient Rehabilitation &  Therapy  [facility]   [place]: [phone]  F: [phone]      Physical Therapy Daily Treatment Note    Date:  2020  Patient Name:  Efren Sarkar    :  1970  MRN: [id number]  Physician: Dr. Mayo Sow: Pipo Theodore (authorized 18 visits from 2020 to 2020)  Medical Diagnosis: closed displaced fracture of the greater tuberosity of right humerus, closed fracture distal end of right radius        Date of surgery 2020          Rehab Codes: M79.621, M79.641, M62.511, M62.531, M25.511, MM25.531  Onset Date:  2020          Next 's appt: 2020  Visit# / total visits: 10/16    Cancels/No Shows: 0/0    Subjective:    Pain:  [x] Yes  [] No Location: R shoulder and R wrist Pain Rating: (0-10 scale) 3/10  Pain altered Tx:  [x] No  [] Yes  Action:  Comments: Patient arrived noting increased fatigue from returning to work. Objective:  Precautions: repair of old right supraspinatus tear (not a full repair)  Right greater tuberosity fx  ORIF to right wrist  2020 date of surgery  NO STRENGTHENING OF ROTATOR CUFF     RIGHT WRIST gentle progressive ROM and edema control  630-20 (6 weeks post op) can begin gentle progressive strengthening Per recent 6/15/2020 MD note    Exercises:    Exercise      RUE ORIF Reps/ Time Weight/ Level Comments   Pulleys 4'/4'           Table slides 10x10\"  Flex/Abd   Pronation/ supnation Stretch  3x30\"           Finger web  2x10 Yellow    4 way wrist  2x10 orange          Mat      SL ER x20     SL Abd x10     SL Flex x20     Supine Punches x10     Supine ABCs      Wand flex 2x10     Wand Abd x4    pain   Wand ER 2x10             TBand      Retraction 2x10 green    Ext 2x10 green    ER/IR 2x10 green                    Other:   Manual PROM all planes to tolerance and limits.  Gentle joint mobility all planes     Gamready x15' mild pressure RUE shoulder      RUE Continue current frequency toward long and short term goals.      [x] Specific Instructions for subsequent treatments: continue to focus on ROM       Time In: 1600           Time Out: 1700    Electronically signed by:  Annita Mcgill PTA

## 2020-07-17 ENCOUNTER — HOSPITAL ENCOUNTER (OUTPATIENT)
Dept: PHYSICAL THERAPY | Facility: CLINIC | Age: 50
Setting detail: THERAPIES SERIES
Discharge: HOME | End: 2020-07-17
Payer: COMMERCIAL

## 2020-07-17 PROCEDURE — 97016 VASOPNEUMATIC DEVICE THERAPY: CPT

## 2020-07-17 PROCEDURE — 97110 THERAPEUTIC EXERCISES: CPT

## 2020-07-17 NOTE — FLOWSHEET NOTE
[x] [facility]  Outpatient Rehabilitation &  Therapy  [facility]   [place]: [phone]  F: [phone]      Physical Therapy Daily Treatment Note    Date:  2020  Patient Name:  Raquel Sanchez    :  1970  MRN: [id number]  Physician: Dr. Andrade Govern: Irma Bones (authorized 18 visits from 2020 to 2020)  Medical Diagnosis: closed displaced fracture of the greater tuberosity of right humerus, closed fracture distal end of right radius        Date of surgery 2020          Rehab Codes: M79.621, M79.641, M62.511, M62.531, M25.511, MM25.531  Onset Date:  2020          Next 's appt: 2020  Visit# / total visits:     Cancels/No Shows: 0/0    Subjective:    Pain:  [x] Yes  [] No Location: R shoulder and R wrist Pain Rating: (0-10 scale) 3/10  Pain altered Tx:  [x] No  [] Yes  Action:  Comments:     Objective:  Precautions: repair of old right supraspinatus tear (not a full repair)  Right greater tuberosity fx  ORIF to right wrist  2020 date of surgery  NO STRENGTHENING OF ROTATOR CUFF     RIGHT WRIST gentle progressive ROM and edema control  20 (6 weeks post op) can begin gentle progressive strengthening Per recent 6/15/2020 MD note    Exercises:    Exercise      RUE ORIF Reps/ Time Weight/ Level Comments   Pulleys 4'/4'           Table slides 10x10\"  Flex/Abd   Pronation/ supnation Stretch  3x30\"     Towel IR S 10x10\"           Mat      SL ER x20     SL Abd x10     SL Flex x20     Supine Punches x10     Supine ABCs      Wand flex 2x10     Wand Abd x4    pain   Wand ER 2x10             TBand      Retraction 2x10 green    Ext 2x10 green    ER/IR 2x10 green    Zechariah ER 2x10 green    HAB 2x10 green     Other:   Manual PROM all planes to tolerance and limits.  Gentle joint mobility all planes     Gamready x15' mild pressure RUE shoulder    Specific Instructions for next treatment: Progress ROM as tolerated    Treatment 1108    Electronically signed by:  Nela Bradley, PTA

## 2020-07-20 ENCOUNTER — HOSPITAL ENCOUNTER (OUTPATIENT)
Dept: PHYSICAL THERAPY | Facility: CLINIC | Age: 50
Setting detail: THERAPIES SERIES
Discharge: HOME | End: 2020-07-20
Payer: COMMERCIAL

## 2020-07-20 PROCEDURE — 97016 VASOPNEUMATIC DEVICE THERAPY: CPT

## 2020-07-20 PROCEDURE — 97110 THERAPEUTIC EXERCISES: CPT

## 2020-07-20 NOTE — FLOWSHEET NOTE
RUE shoulder    Specific Instructions for next treatment: Progress ROM as tolerated    Treatment Charges: Mins Units   []  Modalities     [x]  Ther Exercise 20 1   []  Manual Therapy     []  Ther Activities     []  Aquatics     [x]  Vasocompression 15 1   []  Other     Total Treatment time 35 2       Assessment: [x] Progressing toward goals. Limited this visit to just ROM exercises as patients shoulder was very sore with strengthening progressions. Continued improvement noted in ROM/AROM this date. [] No change. [] Other:    [x] Patient would continue to benefit from skilled physical therapy services in order to: reduce edema and increase RUE ROM/strength. STG: (to be met in 8 treatments)  1. ? Pain: 2/10 or less as she initiates more use of right UE.  2. ? ROM: PROM of right shoulder flexion and abduction to 90.  3. Functional AROM of right wrist flexion, extension, able to supinate to 45 degrees  4. Minimal edema in right hand  5. Patient to be independent with home exercise program as demonstrated by performance with correct form without cues. 6. Demonstrate Knowledge of fall prevention, met 6-1-2020 low fall risk per Дмитрий Herrera, no intervention needed    LTG: (to be met in 16 treatments)  1. Functional AROM of right shoulder to raise arms for dressing and grooming. 2. -4/5 strength in right shoulder to allow her to hold up arms and use tools for hair dressing, blow dryer, etc  3. Ability to fully close right hand and  20# or more of strength  4. No edema in right hand  5. Resume sleeping supine in her bed (vs. Recliner)  6. Resume driving  7. Ability to perform fine motor tasks like buttons, zippers    Pt. Education:  [x] Yes  [] No  [] Reviewed Prior HEP/Ed  Method of Education: [x] Verbal  [] Demo  [] Written  Comprehension of Education:  [x] Verbalizes understanding. [] Demonstrates understanding. [] Needs review. [x] Demonstrates/verbalizes HEP/Ed previously given.      Plan: [x] Continue current frequency toward long and short term goals.      [x] Specific Instructions for subsequent treatments: continue to focus on ROM       Time In: 1600             Time Out: 1645    Electronically signed by:  Rick Will PTA

## 2020-07-24 ENCOUNTER — HOSPITAL ENCOUNTER (OUTPATIENT)
Dept: PHYSICAL THERAPY | Facility: CLINIC | Age: 50
Setting detail: THERAPIES SERIES
Discharge: HOME | End: 2020-07-24
Payer: COMMERCIAL

## 2020-07-24 PROCEDURE — 97110 THERAPEUTIC EXERCISES: CPT

## 2020-07-24 PROCEDURE — 97016 VASOPNEUMATIC DEVICE THERAPY: CPT

## 2020-07-24 NOTE — FLOWSHEET NOTE
[x] [facility]  Outpatient Rehabilitation &  Therapy  [facility]   [address]: [phone]  F: [phone]      Physical Therapy Daily Treatment Note    Date:  2020  Patient Name:  Alcira Tiwari    :  1970  MRN: [id number]  Physician: Dr. Sanchez Proffer: Rafi Kahn (authorized 18 visits from 2020 to 2020)  Medical Diagnosis: closed displaced fracture of the greater tuberosity of right humerus, closed fracture distal end of right radius        Date of surgery 2020          Rehab Codes: M79.621, M79.641, M62.511, M62.531, M25.511, MM25.531  Onset Date:  2020          Next 's appt: 2020  Visit# / total visits:     Cancels/No Shows: 0/0    Subjective:    Pain:  [x] Yes  [] No Location: R shoulder and R wrist Pain Rating: (0-10 scale) 3/10  Pain altered Tx:  [x] No  [] Yes  Action:  Comments: Patient arrived noting feeling continued improvements in ROM. Objective:     RIGHT WRIST gentle progressive ROM and edema control  20 (6 weeks post op) can begin gentle progressive strengthening Per recent 6/15/2020 MD note    Exercises:    Exercise      RUE ORIF Reps/ Time Weight/ Level Comments   Pulleys 4'/4'           Table slides 10x10\"  Flex/Abd   Pronation/ supnation Stretch  3x30\"     Towel IR S 10x10\"           Mat      SL ER 2x10     SL Abd 2x10     SL Flex 2x10     Supine Punches 2x10     Supine ABCs      Wand flex 10x10\"     Wand Abd 10x10\"     Wand ER 10x10\"             TBand      Retraction 2x10 green    Ext 2x10 green    ER/IR 2x10 green    Zechariah ER 2x10 green    HAB 2x10 green     Other:   Manual PROM all planes to tolerance and limits.  Gentle joint mobility all planes     Gamready x15' mild pressure RUE shoulder    Specific Instructions for next treatment: Progress ROM as tolerated    Treatment Charges: Mins Units   []  Modalities     [x]  Ther Exercise 30 2   []  Manual Therapy     []  Ther Activities     [] Aquatics     [x]  Vasocompression 15 1   []  Other     Total Treatment time 45 3       Assessment: [x] Progressing toward goals. Patient showing improved AROM and PROM this date. No complaint of pain with exercise progressions. [] No change. [] Other:    [x] Patient would continue to benefit from skilled physical therapy services in order to: reduce edema and increase RUE ROM/strength. STG: (to be met in 8 treatments)  1. ? Pain: 2/10 or less as she initiates more use of right UE.  2. ? ROM: PROM of right shoulder flexion and abduction to 90.  3. Functional AROM of right wrist flexion, extension, able to supinate to 45 degrees  4. Minimal edema in right hand  5. Patient to be independent with home exercise program as demonstrated by performance with correct form without cues. 6. Demonstrate Knowledge of fall prevention, met 6-1-2020 low fall risk per Daniel Chau, no intervention needed    LTG: (to be met in 16 treatments)  1. Functional AROM of right shoulder to raise arms for dressing and grooming. 2. -4/5 strength in right shoulder to allow her to hold up arms and use tools for hair dressing, blow dryer, etc  3. Ability to fully close right hand and  20# or more of strength  4. No edema in right hand  5. Resume sleeping supine in her bed (vs. Recliner)  6. Resume driving  7. Ability to perform fine motor tasks like buttons, zippers    Pt. Education:  [x] Yes  [] No  [] Reviewed Prior HEP/Ed  Method of Education: [x] Verbal  [] Demo  [] Written  Comprehension of Education:  [x] Verbalizes understanding. [] Demonstrates understanding. [] Needs review. [x] Demonstrates/verbalizes HEP/Ed previously given. Plan: [x] Continue current frequency toward long and short term goals.      [x] Specific Instructions for subsequent treatments: continue to focus on ROM       Time In: 1000             Time Out: 1110    Electronically signed by:  Wagner Castro PTA

## 2020-07-27 ENCOUNTER — OFFICE VISIT (OUTPATIENT)
Dept: ORTHOPEDIC SURGERY | Age: 50
End: 2020-07-27

## 2020-07-27 VITALS — WEIGHT: 200 LBS | HEIGHT: 67 IN | BODY MASS INDEX: 31.39 KG/M2

## 2020-07-27 PROCEDURE — 99024 POSTOP FOLLOW-UP VISIT: CPT | Performed by: ORTHOPAEDIC SURGERY

## 2020-07-28 ENCOUNTER — HOSPITAL ENCOUNTER (OUTPATIENT)
Dept: PHYSICAL THERAPY | Facility: CLINIC | Age: 50
Setting detail: THERAPIES SERIES
Discharge: HOME | End: 2020-07-28
Payer: COMMERCIAL

## 2020-07-28 PROCEDURE — 97016 VASOPNEUMATIC DEVICE THERAPY: CPT

## 2020-07-28 PROCEDURE — 97140 MANUAL THERAPY 1/> REGIONS: CPT

## 2020-07-28 PROCEDURE — 97110 THERAPEUTIC EXERCISES: CPT

## 2020-07-28 NOTE — FLOWSHEET NOTE
[x] [facility]  Outpatient Rehabilitation &  Therapy  [facility]   [place]: [phone]  F: [phone]      Physical Therapy Daily Treatment Note    Date:  2020  Patient Name:  Rivas Denny    :  1970  MRN: [id number]  Physician: Dr. Madi Ralph: Frederick Quiñones (authorized 18 visits from 2020 to 2020)  Medical Diagnosis: closed displaced fracture of the greater tuberosity of right humerus, closed fracture distal end of right radius        Date of surgery 2020          Rehab Codes: M79.621, M79.641, M62.511, M62.531, M25.511, MM25.531  Onset Date:  2020          Next 's appt: 2020  Visit# / total visits:     Cancels/No Shows: 0/0    Subjective:    Pain:  [x] Yes  [] No Location: R shoulder and R wrist Pain Rating: (0-10 scale) 3/10  Pain altered Tx:  [x] No  [] Yes  Action:  Comments: Patient arrived noting feeling continued improvements in ROM. Objective:     RIGHT WRIST gentle progressive ROM and edema control  30-20 (6 weeks post op) can begin gentle progressive strengthening Per recent 6/15/2020 MD note    Exercises:    Exercise    RUE ORIF Reps/ Time Weight/ Level Comments   Pulleys 4'/4'  Flex/abd          Table slides 10x30\" Standing  Flex/Abd   Towel IR S 10x10\"           Mat      SL ER 2x10 2#    SL Abd 2x10 2#    Supine Punches 2x10     Supine ABCs      Wand flex 10x10\"     Wand Abd 10x10\"     Wand ER 10x30\"             Standing      Wall walks fwd/abd 30\"x5           TBand      Retraction 2x10 green    Ext 2x10 green    ER/IR 2x10 green    Zechariah ER 2x10 green    HAB 2x10 green     Other:   Manual PROM all planes to tolerance and limits.  RUE GH glides all planes with focus on inf and post/lat glides     Gamready x15' mild pressure RUE shoulder      RUE AAROM Shoulder:  Flex 120  Abd 118  ER 55  IR 58    Specific Instructions for next treatment: Progress ROM as tolerated    Treatment Charges: Mins Units []  Modalities     [x]  Ther Exercise 30 2   [x]  Manual Therapy 10 1   []  Ther Activities     []  Aquatics     [x]  Vasocompression 15 1   []  Other     Total Treatment time 55 4       Assessment: [x] Progressing toward goals. Patient with improved ROM from last measurement, felt increased motion with manual glides and stretches in te clinic. [] No change. [] Other:    [x] Patient would continue to benefit from skilled physical therapy services in order to: reduce edema and increase RUE ROM/strength. STG: (to be met in 8 treatments)  1. ? Pain: 2/10 or less as she initiates more use of right UE.  2. ? ROM: PROM of right shoulder flexion and abduction to 90.  3. Functional AROM of right wrist flexion, extension, able to supinate to 45 degrees  4. Minimal edema in right hand  5. Patient to be independent with home exercise program as demonstrated by performance with correct form without cues. 6. Demonstrate Knowledge of fall prevention, met 6-1-2020 low fall risk per Melody Rubi, no intervention needed    LTG: (to be met in 16 treatments)  1. Functional AROM of right shoulder to raise arms for dressing and grooming. 2. -4/5 strength in right shoulder to allow her to hold up arms and use tools for hair dressing, blow dryer, etc  3. Ability to fully close right hand and  20# or more of strength  4. No edema in right hand  5. Resume sleeping supine in her bed (vs. Recliner)  6. Resume driving  7. Ability to perform fine motor tasks like buttons, zippers    Pt. Education:  [x] Yes  [] No  [x] Reviewed Prior HEP/Ed  Method of Education: [x] Verbal  [] Demo  [] Written  Comprehension of Education:  [x] Verbalizes understanding. [x] Demonstrates understanding. [x] Needs review. [x] Demonstrates/verbalizes HEP/Ed previously given. Plan: [x] Continue current frequency toward long and short term goals.      [x] Specific Instructions for subsequent treatments: continue to focus on ROM       Time In: 1600              Time Out: 6748 [employer]    Electronically signed by:  Virginie Griffin, PT

## 2020-07-30 NOTE — PROGRESS NOTES
Procedure: Right shoulder greater tuberosity fracture ORIF and rotator cuff repair. Right distal radius fracture ORIF  Date of procedure: 5/5/2020    HPI:  Richard Nguyen is a 44 y.o. female who is approximately 3 months status post aforementioned procedure. She continues to do relatively well at this time indicating that she has no pain except for with stretching and physical therapy. Physical examination:  Evaluation of patient's right shoulder, wrist, and upper extremity demonstrates her incisions to be appropriately healed. There is no warmth, erythema, wound dehiscence or drainage. Sensation is grossly intact light touch in all dermatomes and she has a 2+ radial pulse with brisk capillary refill in her fingers. Actively through the shoulder she has 80 degrees of shoulder elevation, 65 degrees of abduction, 50 degrees of external rotation. Passively she has about 105 degrees of shoulder elevation, 110 degrees of abduction and 50 degrees of external rotation. She is nontender to palpation about the wrist and has relatively good range of motion and no gross instability at the DRUJ. Imaging studies: 4 x-ray views of the patient's right shoulder completed on 7/27/20 demonstrates glenohumeral joint to be well aligned without obvious dislocation or subluxation. Suture anchors noted in the greater tuberosity. Fracture fragment continues to appear to be well approximated without interval displacement. 3 x-ray views of the right wrist completed on 7/27/20 demonstrates her distal radius fracture to be in good alignment. No evidence of loosening or migration of her implants. There does appear to be interval consolidation across the fracture site. Impression and plan:  Richard Nguyen is a 44 y.o. female who is approximately 3 months status post an ORIF of her right greater tuberosity fracture and distal radius fracture.   She is doing relatively well but I have concerned that she is getting

## 2020-07-31 ENCOUNTER — HOSPITAL ENCOUNTER (OUTPATIENT)
Dept: PHYSICAL THERAPY | Facility: CLINIC | Age: 50
Setting detail: THERAPIES SERIES
Discharge: HOME | End: 2020-07-31
Payer: COMMERCIAL

## 2020-07-31 PROCEDURE — 97140 MANUAL THERAPY 1/> REGIONS: CPT

## 2020-07-31 PROCEDURE — 97110 THERAPEUTIC EXERCISES: CPT

## 2020-07-31 NOTE — FLOWSHEET NOTE
[x] [facility]  Outpatient Rehabilitation &  Therapy  [facility]   [place]: [phone]  F: [phone]      Physical Therapy Daily Treatment Note    Date:  2020  Patient Name:  Sebastián Rodrigues    :  1970  MRN: [id number]  Physician: Dr. Zeinab Gallardo: Jackie Napier (authorized 18 visits from 2020 to 2020)  Medical Diagnosis: closed displaced fracture of the greater tuberosity of right humerus, closed fracture distal end of right radius        Date of surgery 2020          Rehab Codes: M79.621, M79.641, M62.511, M62.531, M25.511, MM25.531  Onset Date:  2020          Next 's appt: 2020  Visit# / total visits:     Cancels/No Shows: 0/0    Subjective:    Pain:  [x] Yes  [] No Location: R shoulder and R wrist Pain Rating: (0-10 scale) 3/10  Pain altered Tx:  [x] No  [] Yes  Action:  Comments: Patient arrives today with mild pain, worse with end-range stretches. Pain is minimal at rest but jumps up to 8/10 with end-range stretches. Objective:       20 (6 weeks post op) can begin gentle progressive strengthening Per recent 6/15/2020 MD note    Exercises:    Exercise    RUE ORIF Reps/ Time Weight/ Level Comments   Pulleys 4'/4'  Flex/abd          Table slides 10x30\" Standing  Flex/Abd   Towel IR S 10x10\"           Mat      SL ER 2x10 2#    SL Abd 2x10 2#    Supine Punches 2x10     Supine ABCs      Wand flex 30\"x5     Wand Abd 30\"x5     Wand ER 30\"x5             Standing      Wall walks fwd/abd 30\"x5           TBand      Retraction 2x10 green    Ext 2x10 green    ER/IR 2x10 green    Zechariah ER 2x10 green    HAB 2x10 green     Other:    Manual PROM all planes to tolerance and limits.    RUE GH glides all planes with focus on inf and post/lat glides   RUE lat/subscap DI      RUE AAROM Shoulder:  Flex 134  Abd 141  ER 55  IR 58    Specific Instructions for next treatment: Progress ROM as tolerated    Treatment Charges: Mins Units   [] Modalities     [x]  Ther Exercise 40 3   [x]  Manual Therapy 15 1   []  Ther Activities     []  Aquatics     [x]  Vasocompression     []  Other     Total Treatment time 55 4       Assessment: [x] Progressing toward goals. Patient with improved AAROM measurements today following manual. Patient feel she has been working on mobility and doing a good job at pushing herself. [] No change. [] Other:    [x] Patient would continue to benefit from skilled physical therapy services in order to: reduce edema and increase RUE ROM/strength. STG: (to be met in 8 treatments)  1. ? Pain: 2/10 or less as she initiates more use of right UE.  2. ? ROM: PROM of right shoulder flexion and abduction to 90.  3. Functional AROM of right wrist flexion, extension, able to supinate to 45 degrees  4. Minimal edema in right hand  5. Patient to be independent with home exercise program as demonstrated by performance with correct form without cues. 6. Demonstrate Knowledge of fall prevention, met 6-1-2020 low fall risk per Luis Enrique Sprain, no intervention needed    LTG: (to be met in 16 treatments)  1. Functional AROM of right shoulder to raise arms for dressing and grooming. 2. -4/5 strength in right shoulder to allow her to hold up arms and use tools for hair dressing, blow dryer, etc  3. Ability to fully close right hand and  20# or more of strength  4. No edema in right hand  5. Resume sleeping supine in her bed (vs. Recliner)  6. Resume driving  7. Ability to perform fine motor tasks like buttons, zippers    Pt. Education:  [x] Yes  [] No  [x] Reviewed Prior HEP/Ed  Method of Education: [x] Verbal  [] Demo  [] Written  Comprehension of Education:  [x] Verbalizes understanding. [x] Demonstrates understanding. [x] Needs review. [x] Demonstrates/verbalizes HEP/Ed previously given. Plan: [x] Continue current frequency toward long and short term goals.      [x] Specific Instructions for subsequent treatments: continue to focus on ROM       Time In: 1000             Time Out: 1100    Electronically signed by:  Gonsalo Souza PT

## 2020-08-03 ENCOUNTER — HOSPITAL ENCOUNTER (OUTPATIENT)
Dept: PHYSICAL THERAPY | Facility: CLINIC | Age: 50
Setting detail: THERAPIES SERIES
Discharge: HOME | End: 2020-08-03
Payer: COMMERCIAL

## 2020-08-03 PROCEDURE — 97110 THERAPEUTIC EXERCISES: CPT

## 2020-08-03 NOTE — FLOWSHEET NOTE
[x] [facility]  Outpatient Rehabilitation &  Therapy  [facility]   [place]: [phone]  F: [phone]      Physical Therapy Daily Treatment Note    Date:  8/3/2020  Patient Name:  Khanh Medina    :  1970  MRN: [id number]  Physician: Dr. Cristian Sosa: Drury Felty (authorized 18 visits from 2020 to 2020)  Medical Diagnosis: closed displaced fracture of the greater tuberosity of right humerus, closed fracture distal end of right radius        Date of surgery 2020          Rehab Codes: M79.621, M79.641, M62.511, M62.531, M25.511, MM25.531  Onset Date:  2020          Next 's appt: 2020  Visit# / total visits: 15/18    Cancels/No Shows: 0/0    Subjective:    Pain:  [x] Yes  [] No Location: R shoulder and R wrist Pain Rating: (0-10 scale) 3/10  Pain altered Tx:  [x] No  [] Yes  Action:  Comments: Patient arrives today stating she is improving slowly but sure. Objective:       20 (6 weeks post op) can begin gentle progressive strengthening Per recent 6/15/2020 MD note    Exercises:    Exercise    RUE ORIF Reps/ Time Weight/ Level Comments   Pulleys 4'/4'  Flex/abd          *Table slides 5x30\" Standing  Flex/Abd   Towel IR S 10x10\"           Mat      SL ER 2x10 2#    SL Abd 2x10 2#    Supine Punches 2x10     Supine ABCs      Wand flex 30\"x5     Wand Abd 30\"x5     Wand ER 30\"x5             Standing      *Wall walks fwd/abd 30\"x5           TBand      *Retraction 2x10 green    *Ext 2x10 green    *ER/IR 2x10 green    *Zechariah ER 2x10 green    *HAB 2x10 green     Other:    Manual PROM all planes to tolerance and limits.    RUE GH glides all planes with focus on inf and post/lat glides   RUE lat/subscap DI      RUE AAROM Shoulder:  Flex 134  Abd 141  ER 55  IR 58    Specific Instructions for next treatment: Progress ROM as tolerated    Treatment Charges: Mins Units   []  Modalities     [x]  Ther Exercise 40 3   [x]  Manual Therapy 5 0   [] Ther Activities     []  Aquatics     [x]  Vasocompression     []  Other     Total Treatment time 45 3       Assessment: [x] Progressing toward goals. Pt with sig pain during wall walks, pt able to complete all sets. Administered t-band for HEP, pt with good form requiring minimal cueing for correction. Focused on standing program and manual this date, pt notes improved symptoms post session. Offered vaso, pt plans to ice at home. Will continue to progress shoulder ROM. [] No change. [] Other:    [x] Patient would continue to benefit from skilled physical therapy services in order to: reduce edema and increase RUE ROM/strength. STG: (to be met in 8 treatments)  1. ? Pain: 2/10 or less as she initiates more use of right UE.  2. ? ROM: PROM of right shoulder flexion and abduction to 90.  3. Functional AROM of right wrist flexion, extension, able to supinate to 45 degrees  4. Minimal edema in right hand  5. Patient to be independent with home exercise program as demonstrated by performance with correct form without cues. 6. Demonstrate Knowledge of fall prevention, met 6-1-2020 low fall risk per Bushra Franklinter, no intervention needed    LTG: (to be met in 16 treatments)  1. Functional AROM of right shoulder to raise arms for dressing and grooming. 2. -4/5 strength in right shoulder to allow her to hold up arms and use tools for hair dressing, blow dryer, etc  3. Ability to fully close right hand and  20# or more of strength  4. No edema in right hand  5. Resume sleeping supine in her bed (vs. Recliner)  6. Resume driving  7. Ability to perform fine motor tasks like buttons, zippers    Pt. Education:  [x] Yes  [] No  [x] Reviewed Prior HEP/Ed  Method of Education: [x] Verbal  [] Demo  [] Written  Comprehension of Education:  [x] Verbalizes understanding. [x] Demonstrates understanding. [x] Needs review. [x] Demonstrates/verbalizes HEP/Ed previously given.       Plan: [x] Continue current frequency

## 2020-08-06 ENCOUNTER — HOSPITAL ENCOUNTER (OUTPATIENT)
Dept: PHYSICAL THERAPY | Facility: CLINIC | Age: 50
Setting detail: THERAPIES SERIES
Discharge: HOME | End: 2020-08-06
Payer: COMMERCIAL

## 2020-08-06 PROCEDURE — 97140 MANUAL THERAPY 1/> REGIONS: CPT

## 2020-08-06 PROCEDURE — 97110 THERAPEUTIC EXERCISES: CPT

## 2020-08-06 PROCEDURE — 97016 VASOPNEUMATIC DEVICE THERAPY: CPT

## 2020-08-11 ENCOUNTER — HOSPITAL ENCOUNTER (OUTPATIENT)
Dept: PHYSICAL THERAPY | Facility: CLINIC | Age: 50
Setting detail: THERAPIES SERIES
Discharge: HOME | End: 2020-08-11
Payer: COMMERCIAL

## 2020-08-11 PROCEDURE — 97016 VASOPNEUMATIC DEVICE THERAPY: CPT

## 2020-08-11 PROCEDURE — 97140 MANUAL THERAPY 1/> REGIONS: CPT

## 2020-08-11 PROCEDURE — 97110 THERAPEUTIC EXERCISES: CPT

## 2020-08-11 NOTE — FLOWSHEET NOTE
[x] [facility]  Outpatient Rehabilitation &  Therapy  [facility]   [place]: [phone]  F: [phone]      Physical Therapy Daily Treatment Note    Date:  2020  Patient Name:  Stephanie Harley    :  1970  MRN: [id number]  Physician: Dr. Marquez Persons: Martin Graham (authorized 18 visits from 2020 to 2020) (6 additional visits approved for total of 24)  Medical Diagnosis: closed displaced fracture of the greater tuberosity of right humerus, closed fracture distal end of right radius        Date of surgery 2020          Rehab Codes: M79.621, M79.641, M62.511, M62.531, M25.511, MM25.531  Onset Date:  2020          Next 's appt: 20    Visit# / total visits:     Cancels/No Shows: 0/0    Subjective:    Pain:  [] Yes  [x] No Location: R shoulder and R wrist Pain Rating: (0-10 scale) 0/10  Pain altered Tx:  [x] No  [] Yes  Action:  Comments: Patient arrived noting no pain with continued improvements. Objective:  Exercises:    Exercise    RUE ORIF Reps/ Time Weight/ Level Comments   Pulleys 4'/4'  Flex/abd          *Table slides 5x30\" Standing  Flex/Abd   Towel IR S 10x10\"           Mat      SL ER 2x10 3#    SL Abd 2x10 3#    Supine Punches 2x10     Supine ABCs      Wand flex 30\"x5     Wand Abd 30\"x5     Wand ER 30\"x5             Standing      *Wall walks fwd/abd 30\"x5            TBand      *Retraction 2x10 purple    *Ext 2x10 purple    *ER/IR 2x10 purple (IR) green (ER)    *Zechariah ER 2x10 green    *HAB 2x10 green     Other:    Manual PROM all planes to tolerance and limits.    RUE GH glides all planes with focus on inf and post/lat glides   RUE Lat origin DI       RUE AAROM Shoulder:  Flex 144  Abd 145  ER 60  IR 63    Specific Instructions for next treatment: Progress ROM as tolerated    Treatment Charges: Mins Units   []  Modalities     [x]  Ther Exercise 35 2   [x]  Manual Therapy 10 1   []  Ther Activities     []  Aquatics     [x] Vasocompression 15 1   []  Other     Total Treatment time 60 4       Assessment: [x] Progressing toward goals. Continued strength progressions and focus on ROM. [] No change. [] Other:    [x] Patient would continue to benefit from skilled physical therapy services in order to: reduce edema and increase RUE ROM/strength. STG: (to be met in 8 treatments)  1. ? Pain: 2/10 or less as she initiates more use of right UE.  2. ? ROM: PROM of right shoulder flexion and abduction to 90.  3. Functional AROM of right wrist flexion, extension, able to supinate to 45 degrees  4. Minimal edema in right hand  5. Patient to be independent with home exercise program as demonstrated by performance with correct form without cues. 6. Demonstrate Knowledge of fall prevention, met 6-1-2020 low fall risk per Corine Cruz, no intervention needed    LTG: (to be met in 16 treatments)  1. Functional AROM of right shoulder to raise arms for dressing and grooming. 2. -4/5 strength in right shoulder to allow her to hold up arms and use tools for hair dressing, blow dryer, etc  3. Ability to fully close right hand and  20# or more of strength  4. No edema in right hand  5. Resume sleeping supine in her bed (vs. Recliner)  6. Resume driving  7. Ability to perform fine motor tasks like buttons, zippers    Pt. Education:  [x] Yes  [] No  [] Reviewed Prior HEP/Ed  Method of Education: [x] Verbal  [] Demo  [] Written  Comprehension of Education:   [x] Verbalizes understanding. [] Demonstrates understanding. [] Needs review. [] Demonstrates/verbalizes HEP/Ed previously given. Plan: [x] Continue current frequency toward long and short term goals.      [x] Specific Instructions for subsequent treatments: continue to focus on ROM       Time In: 1400             Time Out: 3:00pm    Electronically signed by:  Pritesh Aguayo PTA

## 2020-08-14 ENCOUNTER — APPOINTMENT (OUTPATIENT)
Dept: PHYSICAL THERAPY | Facility: CLINIC | Age: 50
End: 2020-08-14
Payer: COMMERCIAL

## 2020-08-17 ENCOUNTER — HOSPITAL ENCOUNTER (OUTPATIENT)
Dept: PHYSICAL THERAPY | Facility: CLINIC | Age: 50
Setting detail: THERAPIES SERIES
Discharge: HOME | End: 2020-08-17
Payer: COMMERCIAL

## 2020-08-17 PROCEDURE — 97110 THERAPEUTIC EXERCISES: CPT

## 2020-08-17 PROCEDURE — 97016 VASOPNEUMATIC DEVICE THERAPY: CPT

## 2020-08-17 NOTE — FLOWSHEET NOTE
[x] [facility]  Outpatient Rehabilitation &  Therapy  [facility]   [place]: [phone]  F: [phone]      Physical Therapy Daily Treatment Note    Date:  2020  Patient Name:  Jasmyn Hogue    :  1970  MRN: [id number]  Physician: Dr. Neal Ket: Amy Carlos (authorized 18 visits from 2020 to 2020) (6 additional visits approved for total of 24)  Medical Diagnosis: closed displaced fracture of the greater tuberosity of right humerus, closed fracture distal end of right radius        Date of surgery 2020          Rehab Codes: M79.621, M79.641, M62.511, M62.531, M25.511, MM25.531  Onset Date:  2020          Next 's appt: 20    Visit# / total visits:     Cancels/No Shows: 0/0    Subjective:    Pain:  [] Yes  [x] No Location: R shoulder and R wrist Pain Rating: (0-10 scale) 0/10  Pain altered Tx:  [x] No  [] Yes  Action:  Comments: Patient arrived still noting gradual improvements. Objective:  Exercises:    Exercise    RUE ORIF Reps/ Time Weight/ Level Comments   Pulleys 4'/4'  Flex/abd          *Table slides 5x30\" Standing  Flex/Abd   Towel IR S 10x10\"           Mat      SL ER 2x10 3#    SL Abd 2x10 3#    Supine Punches 2x10     Supine ABCs      Wand flex 30\"x5     Wand Abd 30\"x5     Wand ER 30\"x5             Standing      *Wall walks fwd/abd 30\"x5            TBand      *Retraction 2x10 purple    *Ext 2x10 purple    *ER/IR 2x10 purple (IR) green (ER)    *Zechariah ER 2x10 green    *HAB 2x10 green     Other:    Manual PROM all planes to tolerance and limits.    RUE GH glides all planes with focus on inf and post/lat glides   RUE Lat origin DI       RUE AAROM Shoulder:  Flex 144  Abd 145  ER 60  IR 63    Specific Instructions for next treatment: Progress ROM as tolerated    Treatment Charges: Mins Units   []  Modalities     [x]  Ther Exercise 35 2   [x]  Manual Therapy 10 1   []  Ther Activities     []  Aquatics     [] Vasocompression     []  Other     Total Treatment time 45 3       Assessment: [x] Progressing toward goals. Continued increase in strength program this visit. Patient tolerates progressions well with improved ROM and decreased associated pain. [] No change. [] Other:     [x] Patient would continue to benefit from skilled physical therapy services in order to: reduce edema and increase RUE ROM/strength. STG: (to be met in 8 treatments)  1. ? Pain: 2/10 or less as she initiates more use of right UE.  2. ? ROM: PROM of right shoulder flexion and abduction to 90.  3. Functional AROM of right wrist flexion, extension, able to supinate to 45 degrees  4. Minimal edema in right hand  5. Patient to be independent with home exercise program as demonstrated by performance with correct form without cues. 6. Demonstrate Knowledge of fall prevention, met 6-1-2020 low fall risk per Dewanda Cage, no intervention needed    LTG: (to be met in 16 treatments)  1. Functional AROM of right shoulder to raise arms for dressing and grooming. 2. -4/5 strength in right shoulder to allow her to hold up arms and use tools for hair dressing, blow dryer, etc  3. Ability to fully close right hand and  20# or more of strength  4. No edema in right hand  5. Resume sleeping supine in her bed (vs. Recliner)  6. Resume driving  7. Ability to perform fine motor tasks like buttons, zippers    Pt. Education:  [x] Yes  [] No  [] Reviewed Prior HEP/Ed  Method of Education: [x] Verbal  [] Demo  [] Written  Comprehension of Education:   [x] Verbalizes understanding. [] Demonstrates understanding. [] Needs review. [] Demonstrates/verbalizes HEP/Ed previously given. Plan: [x] Continue current frequency toward long and short term goals.      [x] Specific Instructions for subsequent treatments: continue to focus on ROM       Time In: 1100             Time Out: 1145    Electronically signed by:  Amina Zimmerman PTA

## 2020-08-20 ENCOUNTER — APPOINTMENT (OUTPATIENT)
Dept: PHYSICAL THERAPY | Facility: CLINIC | Age: 50
End: 2020-08-20
Payer: COMMERCIAL

## 2020-08-24 ENCOUNTER — HOSPITAL ENCOUNTER (OUTPATIENT)
Dept: PHYSICAL THERAPY | Facility: CLINIC | Age: 50
Setting detail: THERAPIES SERIES
Discharge: HOME | End: 2020-08-24
Payer: COMMERCIAL

## 2020-08-24 PROCEDURE — 97016 VASOPNEUMATIC DEVICE THERAPY: CPT

## 2020-08-24 PROCEDURE — 97110 THERAPEUTIC EXERCISES: CPT

## 2020-08-24 PROCEDURE — 97140 MANUAL THERAPY 1/> REGIONS: CPT

## 2020-08-24 NOTE — FLOWSHEET NOTE
[x] [facility]  Outpatient Rehabilitation &  Therapy  [facility]   [place]: [phone]  F: [phone]      Physical Therapy Daily Treatment Note    Date:  2020  Patient Name:  Claudene Fort    :  1970  MRN: [id number]  Physician: Dr. Sheriff Ulysses: Tom Hewitt (authorized 18 visits from 2020 to 2020) (6 additional visits approved for total of 24)  Medical Diagnosis: closed displaced fracture of the greater tuberosity of right humerus, closed fracture distal end of right radius        Date of surgery 2020          Rehab Codes: M79.621, M79.641, M62.511, M62.531, M25.511, MM25.531  Onset Date:  2020          Next 's appt: 20    Visit# / total visits:     Cancels/No Shows: 0/0    Subjective:    Pain:  [] Yes  [x] No Location: R shoulder and R wrist Pain Rating: (0-10 scale) 0/10  Pain altered Tx:  [x] No  [] Yes  Action:  Comments: Patient arrived no new issues since previous visit. Objective:  Exercises:    Exercise    RUE ORIF Reps/ Time Weight/ Level Comments   Pulleys 4'/4'  Flex/abd          *Table slides 5x30\" Standing  Flex/Abd   Towel IR S 10x10\"           Mat      SL ER 2x10 4#    SL Abd 2x10 4#    Supine Punches 2x10     Supine ABCs x     Wand flex 30\"x5     Wand Abd 30\"x5     Wand ER 30\"x5             Standing      *Wall walks fwd/abd 30\"x5            TBand      *Retraction 2x10 purple    *Ext 2x10 purple    *ER/IR 2x10 purple (IR) blue (ER)    *Zechariah ER 2x10 purple    *HAB 2x10 purple     Other:    Manual PROM all planes to tolerance and limits.    RUE GH glides all planes with focus on inf and post/lat glides   RUE Lat origin DI       Specific Instructions for next treatment: Progress ROM as tolerated    Treatment Charges: Mins Units   []  Modalities     [x]  Ther Exercise 35 2   [x]  Manual Therapy 10 1   []  Ther Activities     []  Aquatics     [x]  Vasocompression 15 1   []  Other     Total Treatment time 60 4 Assessment: [x] Progressing toward goals. Continued strength progressions this date. Patient is showing improved strength and ROM this date. [] No change. [] Other:     [x] Patient would continue to benefit from skilled physical therapy services in order to: reduce edema and increase RUE ROM/strength. STG: (to be met in 8 treatments)  1. ? Pain: 2/10 or less as she initiates more use of right UE.  2. ? ROM: PROM of right shoulder flexion and abduction to 90.  3. Functional AROM of right wrist flexion, extension, able to supinate to 45 degrees  4. Minimal edema in right hand  5. Patient to be independent with home exercise program as demonstrated by performance with correct form without cues. 6. Demonstrate Knowledge of fall prevention, met 6-1-2020 low fall risk per Melody Rubi, no intervention needed    LTG: (to be met in 16 treatments)  1. Functional AROM of right shoulder to raise arms for dressing and grooming. 2. -4/5 strength in right shoulder to allow her to hold up arms and use tools for hair dressing, blow dryer, etc  3. Ability to fully close right hand and  20# or more of strength  4. No edema in right hand  5. Resume sleeping supine in her bed (vs. Recliner)  6. Resume driving  7. Ability to perform fine motor tasks like buttons, zippers    Pt. Education:  [x] Yes  [] No  [] Reviewed Prior HEP/Ed  Method of Education: [x] Verbal  [] Demo  [] Written  Comprehension of Education:   [x] Verbalizes understanding. [] Demonstrates understanding. [] Needs review. [] Demonstrates/verbalizes HEP/Ed previously given. Plan: [x] Continue current frequency toward long and short term goals.      [x] Specific Instructions for subsequent treatments: continue to focus on ROM       Time In: 1400             Time Out: 1510    Electronically signed by:  Michaeleen Halsted, PTA

## 2020-08-31 ENCOUNTER — OFFICE VISIT (OUTPATIENT)
Dept: ORTHOPEDIC SURGERY | Age: 50
End: 2020-08-31
Payer: COMMERCIAL

## 2020-08-31 VITALS — BODY MASS INDEX: 31.39 KG/M2 | HEIGHT: 67 IN | WEIGHT: 200 LBS

## 2020-08-31 PROCEDURE — 99213 OFFICE O/P EST LOW 20 MIN: CPT | Performed by: ORTHOPAEDIC SURGERY

## 2020-08-31 NOTE — PROGRESS NOTES
Procedure: Right shoulder greater tuberosity fracture ORIF and rotator cuff repair. Right distal radius fracture ORIF  Date of procedure: 5/5/2020    HPI:  Raquel Sanchez is a 44 y.o. female who is approximately 4 months status post aforementioned procedure. She states that she continues to do well with regards to her right wrist but she is still not making as much progress as she would like with regards to her shoulder. She still is unable to reach up to her head. 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 She has been going to physical therapy which has been going well. She indicates that she has better active assisted range of motion than pure active range of motion. She is having minimal pain and the only soreness she has is with her stretches and she rates this as a 2/10. A few weeks back she indicates that she try to increase weight with this arm and states that she was stretched quite a bit in physical therapy the very same day and had quite a bit of pain in the shoulder laterally. Consequently she dropped her weight back down and her pain has resolved. Physical examination:  Evaluation of patient's right shoulder, wrist, and upper extremity demonstrates her incisions to be appropriately healed. There is no warmth, erythema, wound dehiscence or drainage. Sensation is grossly intact light touch in all dermatomes and she has a 2+ radial pulse with brisk capillary refill in her fingers. Actively through the shoulder she has 95 degrees of shoulder elevation, 85 degrees of abduction, 55 degrees of external rotation and internal rotation to L3. Passively she has about 120 degrees of shoulder elevation, 110 degrees of abduction and 60 degrees of external rotation.   On the contralateral side she has 150 degrees of active shoulder elevation, 160 degrees of abduction, 85 degrees of external rotation and internal rotation to T12  She is nontender to palpation about the wrist and has relatively good range of motion and no gross instability at the DRUJ. Imaging studies:   4 x-ray views of the patient's right shoulder completed on 8/31/2020 demonstrates glenohumeral joint to be well aligned without obvious dislocation or subluxation. Suture anchors noted in the greater tuberosity. Fracture fragment continues to appear to be well approximated without interval displacement. There appears to be complete consolidation across the fracture site. 3 x-ray views of the right wrist completed on 8/31/20 demonstrates her distal radius fracture to be in good alignment. No evidence of loosening or migration of her implants. There appears to be complete consolidation across the fracture site. Impression and plan:  Prem Fernandes is a 44 y.o. female who is approximately 4 months status post an ORIF of her right greater tuberosity fracture and distal radius fracture. She is doing relatively well but continues to struggle with her shoulder range of motion. She has made some improvement since our last visit but not as much as I would like. I had a discussion with the patient today with regards to options moving forward. This includes attempting a cortisone injection to the glenohumeral joint and continuing on with her rehabilitation versus surgical intervention at this time by way of an arthroscopic lysis of adhesions, capsular release and manipulation under anesthesia. With the lack of significant pain clinically it is unclear that a cortisone injection would add much to her treatment at this time and she really has not made significant improvements with the current treatment consequently I am inclined to recommend surgical intervention as outlined above. We discussed what this would entail in terms of the procedure, risks and benefits and postoperative recovery course.   I would recommend obtaining an MRI study of her shoulder prior to the procedure to delineate the extent of any pathology prior to surgery so that I am able to adequately plan. At this time she would like to proceed with surgery as outlined. We will facilitate her getting cleared for surgery and schedule her for the procedure at her convenience and also facilitate her getting the MRI study. All questions were appropriately answered. I have spent 15 minutes face-to-face with the patient more than 50% of this time was spent counseling and coordinating care as outlined above.

## 2020-09-01 ENCOUNTER — HOSPITAL ENCOUNTER (OUTPATIENT)
Dept: PHYSICAL THERAPY | Facility: CLINIC | Age: 50
Setting detail: THERAPIES SERIES
Discharge: HOME | End: 2020-09-01
Payer: COMMERCIAL

## 2020-09-01 ENCOUNTER — TELEPHONE (OUTPATIENT)
Dept: FAMILY MEDICINE CLINIC | Age: 50
End: 2020-09-01

## 2020-09-01 RX ORDER — NITROFURANTOIN 25; 75 MG/1; MG/1
100 CAPSULE ORAL 2 TIMES DAILY
Qty: 10 CAPSULE | Refills: 0 | Status: SHIPPED | OUTPATIENT
Start: 2020-09-01 | End: 2020-09-06

## 2020-09-01 NOTE — TELEPHONE ENCOUNTER
Pt stated has burning with urination that started last Friday 08/28/2020. Pt stated she has been drinking cranberry juice and has increased her water intake but still has the burning. Pt stated she has a mesh to help with incontinence could that be causing the repeating uti sx.  Please send abx to [facility] on [place]

## 2020-09-04 ENCOUNTER — HOSPITAL ENCOUNTER (OUTPATIENT)
Dept: MRI IMAGING | Facility: CLINIC | Age: 50
Discharge: HOME | End: 2020-09-06
Payer: COMMERCIAL

## 2020-09-04 PROCEDURE — 73221 MRI JOINT UPR EXTREM W/O DYE: CPT

## 2020-09-08 ENCOUNTER — APPOINTMENT (OUTPATIENT)
Dept: PHYSICAL THERAPY | Facility: CLINIC | Age: 50
End: 2020-09-08
Payer: COMMERCIAL

## 2020-09-10 ENCOUNTER — HOSPITAL ENCOUNTER (OUTPATIENT)
Age: 50
Setting detail: SPECIMEN
Discharge: HOME | End: 2020-09-10
Payer: COMMERCIAL

## 2020-09-10 LAB
ANION GAP SERPL CALCULATED.3IONS-SCNC: 12 MMOL/L (ref 9–17)
BUN BLDV-MCNC: 19 MG/DL (ref 6–20)
BUN/CREAT BLD: ABNORMAL (ref 9–20)
CALCIUM SERPL-MCNC: 9.2 MG/DL (ref 8.6–10.4)
CHLORIDE BLD-SCNC: 108 MMOL/L (ref 98–107)
CO2: 22 MMOL/L (ref 20–31)
CREAT SERPL-MCNC: 0.66 MG/DL (ref 0.5–0.9)
GFR AFRICAN AMERICAN: >60 ML/MIN
GFR NON-AFRICAN AMERICAN: >60 ML/MIN
GFR SERPL CREATININE-BSD FRML MDRD: ABNORMAL ML/MIN/{1.73_M2}
GFR SERPL CREATININE-BSD FRML MDRD: ABNORMAL ML/MIN/{1.73_M2}
GLUCOSE BLD-MCNC: 87 MG/DL (ref 70–99)
HCT VFR BLD CALC: 42.3 % (ref 36.3–47.1)
HEMOGLOBIN: 13.4 G/DL (ref 11.9–15.1)
MCH RBC QN AUTO: 30.5 PG (ref 25.2–33.5)
MCHC RBC AUTO-ENTMCNC: 31.7 G/DL (ref 28.4–34.8)
MCV RBC AUTO: 96.4 FL (ref 82.6–102.9)
NRBC AUTOMATED: 0 PER 100 WBC
PDW BLD-RTO: 11.9 % (ref 11.8–14.4)
PLATELET # BLD: 318 K/UL (ref 138–453)
PMV BLD AUTO: 10.4 FL (ref 8.1–13.5)
POTASSIUM SERPL-SCNC: 4.3 MMOL/L (ref 3.7–5.3)
RBC # BLD: 4.39 M/UL (ref 3.95–5.11)
SODIUM BLD-SCNC: 142 MMOL/L (ref 135–144)
WBC # BLD: 5.4 K/UL (ref 3.5–11.3)

## 2020-09-14 ENCOUNTER — OFFICE VISIT (OUTPATIENT)
Dept: FAMILY MEDICINE CLINIC | Age: 50
End: 2020-09-14
Payer: COMMERCIAL

## 2020-09-14 VITALS
TEMPERATURE: 97.8 F | BODY MASS INDEX: 33.52 KG/M2 | WEIGHT: 214 LBS | SYSTOLIC BLOOD PRESSURE: 120 MMHG | DIASTOLIC BLOOD PRESSURE: 68 MMHG | HEART RATE: 85 BPM | OXYGEN SATURATION: 99 %

## 2020-09-14 PROCEDURE — 99214 OFFICE O/P EST MOD 30 MIN: CPT | Performed by: FAMILY MEDICINE

## 2020-09-14 RX ORDER — VENLAFAXINE HYDROCHLORIDE 75 MG/1
75 CAPSULE, EXTENDED RELEASE ORAL DAILY
Qty: 30 CAPSULE | Refills: 3 | Status: SHIPPED
Start: 2020-09-14 | End: 2020-11-25 | Stop reason: CLARIF

## 2020-09-14 ASSESSMENT — PATIENT HEALTH QUESTIONNAIRE - PHQ9
2. FEELING DOWN, DEPRESSED OR HOPELESS: 0
SUM OF ALL RESPONSES TO PHQ9 QUESTIONS 1 & 2: 0
SUM OF ALL RESPONSES TO PHQ QUESTIONS 1-9: 0
SUM OF ALL RESPONSES TO PHQ QUESTIONS 1-9: 0
1. LITTLE INTEREST OR PLEASURE IN DOING THINGS: 0

## 2020-09-14 ASSESSMENT — ENCOUNTER SYMPTOMS
CONSTIPATION: 0
BLOOD IN STOOL: 0
EYES NEGATIVE: 1
SHORTNESS OF BREATH: 0
COUGH: 0
ABDOMINAL PAIN: 0
DIARRHEA: 0
ALLERGIC/IMMUNOLOGIC NEGATIVE: 1

## 2020-09-14 NOTE — LETTER
[facility], [address] [place] [address], [address] [place] [address]  [phone]    Dr. Shlomo Gomes MD        2020      RE: Bhumika QUARLES 1970    Dear Dr. Judy Mleo was evaluated in my office for pre-operative evaluation. Based on review of information available to me at this time, the patient appears to be at no increased risk for the planned procedure. Pre op labs have been reviewed. Current Outpatient Medications on File Prior to Visit   Medication Sig Dispense Refill    levothyroxine (SYNTHROID) 150 MCG tablet TAKE ONE TABLET BY MOUTH DAILY 30 tablet 5    PRILOSEC OTC 20 MG tablet TAKE ONE TABLET BY MOUTH DAILY 30 tablet 3     No current facility-administered medications on file prior to visit. Past Surgical History:   Procedure Laterality Date    CHOLECYSTECTOMY  2019    HUMERUS FRACTURE SURGERY Right 2020    HUMERUS OPEN REDUCTION INTERNAL FIXATION performed by Renae Maloney MD at [facility]      x3    TONSILLECTOMY      WRIST FRACTURE SURGERY Right 2020    WRIST OPEN REDUCTION INTERNAL FIXATION performed by Renae Maloney MD at [address]         If you have any questions, please feel free to contact me.     Sincerely,        Dr. Shlomo Gomes MD

## 2020-09-14 NOTE — PROGRESS NOTES
[facility]  [facility]  [address]  [facility] [address]  Dept: [phone]    9/14/2020    CHIEF COMPLAINT    Chief Complaint   Patient presents with    Other     surgical clearance       HPI    Brittani Mathews is a 44 y.o. female who presents   Chief Complaint   Patient presents with    Other     surgical clearance   . Pre-op clearance for rt shoulder adhesions. [facility] and fractured rt wrist, had to have ORIF. Tore rt rotator cuff, had repair of shoulder. Now has adhesions and cannot raise her arm. Has tolerated general anesthesia with no complications. Taking thyroid med with no side effects. Has not had a period since march, having hot flashes. Shoulder Pain    The pain is present in the right shoulder. This is a new problem. The current episode started more than 1 month ago. There has been a history of trauma. The problem occurs daily. The problem has been unchanged. The quality of the pain is described as aching. Pertinent negatives include no fever. She has tried NSAIDS for the symptoms. The treatment provided mild relief. Vitals:    09/14/20 0934   BP: 120/68   Pulse: 85   Temp: 97.8 °F (36.6 °C)   SpO2: 99%   Weight: 214 lb (97.1 kg)       REVIEW OF SYSTEMS    Review of Systems   Constitutional: Positive for diaphoresis (at night). Negative for fatigue, fever and unexpected weight change. HENT: Negative. Eyes: Negative. Respiratory: Negative for cough and shortness of breath. Cardiovascular: Negative for chest pain, palpitations and leg swelling. Gastrointestinal: Negative for abdominal pain, blood in stool, constipation and diarrhea. Endocrine: Negative. Genitourinary: Negative for frequency and urgency. [facility] 3/2020   Musculoskeletal: Positive for arthralgias (rt shoulder). Skin: Negative. Allergic/Immunologic: Negative. Neurological: Negative for dizziness and headaches.    Hematological: Negative. Psychiatric/Behavioral: Negative for sleep disturbance. The patient is not nervous/anxious.          Lots of stress with current situation       PAST MEDICAL HISTORY    Past Medical History:   Diagnosis Date    Abnormal Pap smear of cervix     GERD (gastroesophageal reflux disease)     Hypothyroidism        FAMILY HISTORY    Family History   Problem Relation Age of Onset    Cancer Mother         unknown    COPD Father     Kidney Disease Father     Colon Polyps Brother        SOCIAL HISTORY    Social History     Socioeconomic History    Marital status:      Spouse name: None    Number of children: 2    Years of education: None    Highest education level: None   Occupational History    Occupation:    Social Needs    Financial resource strain: None    Food insecurity     Worry: None     Inability: None    Transportation needs     Medical: None     Non-medical: None   Tobacco Use    Smoking status: Former Smoker     Last attempt to quit: 2/24/2010     Years since quitting: 10.5    Smokeless tobacco: Never Used   Substance and Sexual Activity    Alcohol use: Yes     Comment: rarely    Drug use: No    Sexual activity: Yes     Partners: Male   Lifestyle    Physical activity     Days per week: None     Minutes per session: None    Stress: None   Relationships    Social connections     Talks on phone: None     Gets together: None     Attends Orthodoxy service: None     Active member of club or organization: None     Attends meetings of clubs or organizations: None     Relationship status: None    Intimate partner violence     Fear of current or ex partner: None     Emotionally abused: None     Physically abused: None     Forced sexual activity: None   Other Topics Concern    None   Social History Narrative    None       SURGICAL HISTORY    Past Surgical History:   Procedure Laterality Date    CHOLECYSTECTOMY  05/2019    HUMERUS FRACTURE SURGERY Right 5/5/2020 HUMERUS OPEN REDUCTION INTERNAL FIXATION performed by [facility] Marquez MD [facility]      x3   [facility] Right 5/5/2020    WRIST OPEN REDUCTION INTERNAL FIXATION performed by Justin Marquez MD at [facility]    Current Outpatient Medications   Medication Sig Dispense Refill    venlafaxine (EFFEXOR XR) 75 MG extended release capsule Take 1 capsule by mouth daily 30 capsule 3    levothyroxine (SYNTHROID) 150 MCG tablet TAKE ONE TABLET BY MOUTH DAILY 30 tablet 5    PRILOSEC OTC 20 MG tablet TAKE ONE TABLET BY MOUTH DAILY 30 tablet 3     No current facility-administered medications for this visit. ALLERGIES    No Known Allergies    PHYSICAL EXAM   Physical Exam  Vitals signs reviewed. Constitutional:       Appearance: She is well-developed. She is obese. HENT:      Head: Normocephalic. Eyes:      Pupils: Pupils are equal, round, and reactive to light. Neck:      Musculoskeletal: Normal range of motion and neck supple. Thyroid: No thyromegaly. Cardiovascular:      Rate and Rhythm: Normal rate and regular rhythm. Heart sounds: Normal heart sounds. No murmur. Pulmonary:      Effort: Pulmonary effort is normal.      Breath sounds: Normal breath sounds. No wheezing or rales. Abdominal:      Palpations: Abdomen is soft. Tenderness: There is no abdominal tenderness. There is no guarding or rebound. Musculoskeletal:         General: Deformity (unable to raise rt shoulder over 90 degrees) present. No tenderness. Lymphadenopathy:      Cervical: No cervical adenopathy. Skin:     General: Skin is warm and dry. Neurological:      Mental Status: She is alert and oriented to person, place, and time. Psychiatric:         Behavior: Behavior normal.         Assessment/PLan  1. Adhesive capsulitis of right shoulder  Plan to proceed with surgical intervention. 2. Menopause syndrome  Discussed starting medication.   Discussed potential side effects. May increase to 150 mg if needed. Discussed low-carb diet for attempting to lose weight and to increase exercise when she is able to.  - venlafaxine (EFFEXOR XR) 75 MG extended release capsule; Take 1 capsule by mouth daily  Dispense: 30 capsule; Refill: 3    3. Pre-operative clearance  Reviewed labs, will send clearance. 4. Hypothyroidism due to Hashimoto's thyroiditis  Cont med, check labs between now and December.   - T4, Free; Future  - TSH without Reflex; Future      Marleni received counseling on the following healthy behaviors: nutrition, exercise and medication adherence  Reviewed prior labs and health maintenance. Continue current medications, diet and exercise. Discussed use, benefit, and side effects of prescribed medications. Barriers to medication compliance addressed. Patient given educational materials - see patient instructions. All patient questions answered. Patient voiced understanding. Return in about 6 months (around 3/14/2021) for thyroid.         Electronically signed by Jose Tripathi MD on 9/14/20 at 9:38 AM EDT

## 2020-09-15 ENCOUNTER — APPOINTMENT (OUTPATIENT)
Dept: PHYSICAL THERAPY | Facility: CLINIC | Age: 50
End: 2020-09-15
Payer: COMMERCIAL

## 2020-09-18 ENCOUNTER — TELEPHONE (OUTPATIENT)
Dept: ORTHOPEDIC SURGERY | Age: 50
End: 2020-09-18

## 2020-09-18 ENCOUNTER — HOSPITAL ENCOUNTER (OUTPATIENT)
Dept: PREADMISSION TESTING | Age: 50
Setting detail: SPECIMEN
Discharge: HOME | End: 2020-09-22
Payer: COMMERCIAL

## 2020-09-18 ENCOUNTER — OFFICE VISIT (OUTPATIENT)
Dept: ORTHOPEDIC SURGERY | Age: 50
End: 2020-09-18

## 2020-09-18 VITALS — TEMPERATURE: 97.9 F

## 2020-09-18 PROCEDURE — U0003 INFECTIOUS AGENT DETECTION BY NUCLEIC ACID (DNA OR RNA); SEVERE ACUTE RESPIRATORY SYNDROME CORONAVIRUS 2 (SARS-COV-2) (CORONAVIRUS DISEASE [COVID-19]), AMPLIFIED PROBE TECHNIQUE, MAKING USE OF HIGH THROUGHPUT TECHNOLOGIES AS DESCRIBED BY CMS-2020-01-R: HCPCS

## 2020-09-18 NOTE — PROGRESS NOTES
Ms. Muriel Robb completed her pre-op office visit today on 9/18/2020 . She is scheduled to have SHOULDER ARTHROSCOPY - CAPSULAR RELEASE WITH MANIPULATION UNDER ANESTHESIA AND PRE OP INTERSCALENE BLOC   on her right shoulder on 9/22/2020 . I confirmed that she completed her necessary lab work, obtained PCP clearance, completed her PAT (via phone), and was scheduled to complete a COVID-19 test. She was provided with Dr. Brian Venegas pre-op packet that includes information regarding the patients' upcoming surgery, prescribed medications, and post-op instructions. Patient was previously prescribed PT and she already has an appt scheduled for 9/23 (day after surgery). A skin check to verify the absence of infection (ie: cuts or abrasions) was completed. Dr. Celestine Bautista will electronically send the necessary prescriptions to the patients' pharmacy, and the patient was instructed to  those medications a day or two before surgery. I confirmed her preferred pharmacy as [facility] as [facility]. Patient was instructed to call back with questions or concerns.

## 2020-09-19 LAB — SARS-COV-2, NAA: NOT DETECTED

## 2020-09-20 ENCOUNTER — TELEPHONE (OUTPATIENT)
Dept: PRIMARY CARE CLINIC | Age: 50
End: 2020-09-20

## 2020-09-21 ENCOUNTER — ANESTHESIA EVENT (OUTPATIENT)
Dept: OPERATING ROOM | Age: 50
End: 2020-09-21
Payer: COMMERCIAL

## 2020-09-21 ENCOUNTER — HOSPITAL ENCOUNTER (OUTPATIENT)
Dept: PHYSICAL THERAPY | Facility: CLINIC | Age: 50
Setting detail: THERAPIES SERIES
Discharge: HOME | End: 2020-09-21
Payer: COMMERCIAL

## 2020-09-21 RX ORDER — ACETAMINOPHEN 325 MG/1
1000 TABLET ORAL ONCE
Status: CANCELLED | OUTPATIENT
Start: 2020-09-21 | End: 2020-09-21

## 2020-09-21 RX ORDER — HYDROCODONE BITARTRATE AND ACETAMINOPHEN 5; 325 MG/1; MG/1
1 TABLET ORAL EVERY 4 HOURS PRN
Qty: 42 TABLET | Refills: 0 | Status: SHIPPED | OUTPATIENT
Start: 2020-09-21 | End: 2020-09-28

## 2020-09-21 RX ORDER — ONDANSETRON 4 MG/1
4 TABLET, FILM COATED ORAL DAILY PRN
Qty: 20 TABLET | Refills: 0 | Status: SHIPPED | OUTPATIENT
Start: 2020-09-21 | End: 2020-10-07

## 2020-09-21 RX ORDER — SODIUM CHLORIDE 0.9 % (FLUSH) 0.9 %
10 SYRINGE (ML) INJECTION PRN
Status: CANCELLED | OUTPATIENT
Start: 2020-09-21

## 2020-09-21 RX ORDER — SODIUM CHLORIDE 0.9 % (FLUSH) 0.9 %
10 SYRINGE (ML) INJECTION EVERY 12 HOURS SCHEDULED
Status: CANCELLED | OUTPATIENT
Start: 2020-09-21

## 2020-09-22 ENCOUNTER — ANESTHESIA (OUTPATIENT)
Dept: OPERATING ROOM | Age: 50
End: 2020-09-22
Payer: COMMERCIAL

## 2020-09-22 ENCOUNTER — HOSPITAL ENCOUNTER (OUTPATIENT)
Age: 50
Setting detail: OUTPATIENT SURGERY
Discharge: HOME | End: 2020-09-22
Attending: ORTHOPAEDIC SURGERY | Admitting: ORTHOPAEDIC SURGERY
Payer: COMMERCIAL

## 2020-09-22 VITALS
SYSTOLIC BLOOD PRESSURE: 102 MMHG | RESPIRATION RATE: 14 BRPM | OXYGEN SATURATION: 96 % | WEIGHT: 218.4 LBS | BODY MASS INDEX: 34.28 KG/M2 | HEIGHT: 67 IN | TEMPERATURE: 97.5 F | HEART RATE: 69 BPM | DIASTOLIC BLOOD PRESSURE: 58 MMHG

## 2020-09-22 VITALS — DIASTOLIC BLOOD PRESSURE: 60 MMHG | TEMPERATURE: 95.7 F | OXYGEN SATURATION: 99 % | SYSTOLIC BLOOD PRESSURE: 94 MMHG

## 2020-09-22 PROCEDURE — 2500000003 HC RX 250 WO HCPCS: Performed by: NURSE ANESTHETIST, CERTIFIED REGISTERED

## 2020-09-22 PROCEDURE — 6360000002 HC RX W HCPCS: Performed by: ANESTHESIOLOGY

## 2020-09-22 PROCEDURE — 3600000003 HC SURGERY LEVEL 3 BASE: Performed by: ORTHOPAEDIC SURGERY

## 2020-09-22 PROCEDURE — 29825 SHO ARTHRS SRG LSS&RESCJ ADS: CPT | Performed by: ORTHOPAEDIC SURGERY

## 2020-09-22 PROCEDURE — 3700000000 HC ANESTHESIA ATTENDED CARE: Performed by: ORTHOPAEDIC SURGERY

## 2020-09-22 PROCEDURE — 7100000001 HC PACU RECOVERY - ADDTL 15 MIN: Performed by: ORTHOPAEDIC SURGERY

## 2020-09-22 PROCEDURE — 6360000002 HC RX W HCPCS

## 2020-09-22 PROCEDURE — 7100000010 HC PHASE II RECOVERY - FIRST 15 MIN: Performed by: ORTHOPAEDIC SURGERY

## 2020-09-22 PROCEDURE — 2580000003 HC RX 258: Performed by: ANESTHESIOLOGY

## 2020-09-22 PROCEDURE — 6360000002 HC RX W HCPCS: Performed by: ORTHOPAEDIC SURGERY

## 2020-09-22 PROCEDURE — 64415 NJX AA&/STRD BRCH PLXS IMG: CPT | Performed by: NURSE ANESTHETIST, CERTIFIED REGISTERED

## 2020-09-22 PROCEDURE — 7100000011 HC PHASE II RECOVERY - ADDTL 15 MIN: Performed by: ORTHOPAEDIC SURGERY

## 2020-09-22 PROCEDURE — 2709999900 HC NON-CHARGEABLE SUPPLY: Performed by: ORTHOPAEDIC SURGERY

## 2020-09-22 PROCEDURE — 6360000002 HC RX W HCPCS: Performed by: NURSE ANESTHETIST, CERTIFIED REGISTERED

## 2020-09-22 PROCEDURE — 6370000000 HC RX 637 (ALT 250 FOR IP)

## 2020-09-22 PROCEDURE — 2500000003 HC RX 250 WO HCPCS: Performed by: ORTHOPAEDIC SURGERY

## 2020-09-22 PROCEDURE — 2500000003 HC RX 250 WO HCPCS: Performed by: ANESTHESIOLOGY

## 2020-09-22 PROCEDURE — C9290 INJ, BUPIVACAINE LIPOSOME: HCPCS | Performed by: ANESTHESIOLOGY

## 2020-09-22 PROCEDURE — 7100000000 HC PACU RECOVERY - FIRST 15 MIN: Performed by: ORTHOPAEDIC SURGERY

## 2020-09-22 PROCEDURE — 3700000001 HC ADD 15 MINUTES (ANESTHESIA): Performed by: ORTHOPAEDIC SURGERY

## 2020-09-22 PROCEDURE — 3600000013 HC SURGERY LEVEL 3 ADDTL 15MIN: Performed by: ORTHOPAEDIC SURGERY

## 2020-09-22 RX ORDER — ROCURONIUM BROMIDE 10 MG/ML
INJECTION, SOLUTION INTRAVENOUS PRN
Status: DISCONTINUED | OUTPATIENT
Start: 2020-09-22 | End: 2020-09-22 | Stop reason: SDUPTHER

## 2020-09-22 RX ORDER — KETOROLAC TROMETHAMINE 30 MG/ML
INJECTION, SOLUTION INTRAMUSCULAR; INTRAVENOUS PRN
Status: DISCONTINUED | OUTPATIENT
Start: 2020-09-22 | End: 2020-09-22 | Stop reason: SDUPTHER

## 2020-09-22 RX ORDER — DIPHENHYDRAMINE HYDROCHLORIDE 50 MG/ML
12.5 INJECTION INTRAMUSCULAR; INTRAVENOUS
Status: DISCONTINUED | OUTPATIENT
Start: 2020-09-22 | End: 2020-09-22 | Stop reason: HOSPADM

## 2020-09-22 RX ORDER — FENTANYL CITRATE 50 UG/ML
25 INJECTION, SOLUTION INTRAMUSCULAR; INTRAVENOUS EVERY 5 MIN PRN
Status: DISCONTINUED | OUTPATIENT
Start: 2020-09-22 | End: 2020-09-22 | Stop reason: HOSPADM

## 2020-09-22 RX ORDER — MIDAZOLAM HYDROCHLORIDE 1 MG/ML
INJECTION INTRAMUSCULAR; INTRAVENOUS
Status: COMPLETED
Start: 2020-09-22 | End: 2020-09-22

## 2020-09-22 RX ORDER — ACETAMINOPHEN 500 MG
TABLET ORAL
Status: COMPLETED
Start: 2020-09-22 | End: 2020-09-22

## 2020-09-22 RX ORDER — HYDRALAZINE HYDROCHLORIDE 20 MG/ML
5 INJECTION INTRAMUSCULAR; INTRAVENOUS EVERY 10 MIN PRN
Status: DISCONTINUED | OUTPATIENT
Start: 2020-09-22 | End: 2020-09-22 | Stop reason: HOSPADM

## 2020-09-22 RX ORDER — BUPIVACAINE HYDROCHLORIDE 5 MG/ML
INJECTION, SOLUTION EPIDURAL; INTRACAUDAL
Status: COMPLETED | OUTPATIENT
Start: 2020-09-22 | End: 2020-09-22

## 2020-09-22 RX ORDER — TRIAMCINOLONE ACETONIDE 40 MG/ML
INJECTION, SUSPENSION INTRA-ARTICULAR; INTRAMUSCULAR
Status: DISCONTINUED
Start: 2020-09-22 | End: 2020-09-22 | Stop reason: WASHOUT

## 2020-09-22 RX ORDER — PROMETHAZINE HYDROCHLORIDE 25 MG/ML
6.25 INJECTION, SOLUTION INTRAMUSCULAR; INTRAVENOUS
Status: DISCONTINUED | OUTPATIENT
Start: 2020-09-22 | End: 2020-09-22 | Stop reason: HOSPADM

## 2020-09-22 RX ORDER — DEXAMETHASONE SODIUM PHOSPHATE 10 MG/ML
10 INJECTION, SOLUTION INTRAMUSCULAR; INTRAVENOUS ONCE
Status: DISCONTINUED | OUTPATIENT
Start: 2020-09-22 | End: 2020-09-22 | Stop reason: HOSPADM

## 2020-09-22 RX ORDER — SODIUM CHLORIDE 0.9 % (FLUSH) 0.9 %
10 SYRINGE (ML) INJECTION EVERY 12 HOURS SCHEDULED
Status: DISCONTINUED | OUTPATIENT
Start: 2020-09-22 | End: 2020-09-22 | Stop reason: SDUPTHER

## 2020-09-22 RX ORDER — ONDANSETRON 2 MG/ML
INJECTION INTRAMUSCULAR; INTRAVENOUS PRN
Status: DISCONTINUED | OUTPATIENT
Start: 2020-09-22 | End: 2020-09-22 | Stop reason: SDUPTHER

## 2020-09-22 RX ORDER — ONDANSETRON 2 MG/ML
4 INJECTION INTRAMUSCULAR; INTRAVENOUS
Status: DISCONTINUED | OUTPATIENT
Start: 2020-09-22 | End: 2020-09-22 | Stop reason: HOSPADM

## 2020-09-22 RX ORDER — SCOLOPAMINE TRANSDERMAL SYSTEM 1 MG/1
PATCH, EXTENDED RELEASE TRANSDERMAL
Status: DISCONTINUED
Start: 2020-09-22 | End: 2020-09-22 | Stop reason: HOSPADM

## 2020-09-22 RX ORDER — SODIUM CHLORIDE 0.9 % (FLUSH) 0.9 %
10 SYRINGE (ML) INJECTION PRN
Status: DISCONTINUED | OUTPATIENT
Start: 2020-09-22 | End: 2020-09-22 | Stop reason: SDUPTHER

## 2020-09-22 RX ORDER — HYDROCODONE BITARTRATE AND ACETAMINOPHEN 5; 325 MG/1; MG/1
1 TABLET ORAL PRN
Status: DISCONTINUED | OUTPATIENT
Start: 2020-09-22 | End: 2020-09-22 | Stop reason: HOSPADM

## 2020-09-22 RX ORDER — MIDAZOLAM HYDROCHLORIDE 1 MG/ML
2 INJECTION INTRAMUSCULAR; INTRAVENOUS ONCE
Status: COMPLETED | OUTPATIENT
Start: 2020-09-22 | End: 2020-09-22

## 2020-09-22 RX ORDER — SODIUM CHLORIDE, SODIUM LACTATE, POTASSIUM CHLORIDE, CALCIUM CHLORIDE 600; 310; 30; 20 MG/100ML; MG/100ML; MG/100ML; MG/100ML
INJECTION, SOLUTION INTRAVENOUS CONTINUOUS
Status: DISCONTINUED | OUTPATIENT
Start: 2020-09-22 | End: 2020-09-22 | Stop reason: HOSPADM

## 2020-09-22 RX ORDER — HYDROCODONE BITARTRATE AND ACETAMINOPHEN 5; 325 MG/1; MG/1
2 TABLET ORAL PRN
Status: DISCONTINUED | OUTPATIENT
Start: 2020-09-22 | End: 2020-09-22 | Stop reason: HOSPADM

## 2020-09-22 RX ORDER — LIDOCAINE HYDROCHLORIDE 10 MG/ML
INJECTION, SOLUTION INFILTRATION; PERINEURAL PRN
Status: DISCONTINUED | OUTPATIENT
Start: 2020-09-22 | End: 2020-09-22 | Stop reason: SDUPTHER

## 2020-09-22 RX ORDER — MORPHINE SULFATE 1 MG/ML
1 INJECTION, SOLUTION EPIDURAL; INTRATHECAL; INTRAVENOUS EVERY 5 MIN PRN
Status: DISCONTINUED | OUTPATIENT
Start: 2020-09-22 | End: 2020-09-22 | Stop reason: HOSPADM

## 2020-09-22 RX ORDER — SODIUM CHLORIDE 9 MG/ML
INJECTION, SOLUTION INTRAVENOUS CONTINUOUS
Status: DISCONTINUED | OUTPATIENT
Start: 2020-09-22 | End: 2020-09-22 | Stop reason: HOSPADM

## 2020-09-22 RX ORDER — SODIUM CHLORIDE 0.9 % (FLUSH) 0.9 %
10 SYRINGE (ML) INJECTION PRN
Status: DISCONTINUED | OUTPATIENT
Start: 2020-09-22 | End: 2020-09-22 | Stop reason: HOSPADM

## 2020-09-22 RX ORDER — BUPIVACAINE HYDROCHLORIDE 5 MG/ML
INJECTION, SOLUTION EPIDURAL; INTRACAUDAL
Status: COMPLETED
Start: 2020-09-22 | End: 2020-09-22

## 2020-09-22 RX ORDER — SODIUM CHLORIDE, SODIUM LACTATE, POTASSIUM CHLORIDE, CALCIUM CHLORIDE 600; 310; 30; 20 MG/100ML; MG/100ML; MG/100ML; MG/100ML
INJECTION, SOLUTION INTRAVENOUS CONTINUOUS
Status: CANCELLED | OUTPATIENT
Start: 2020-09-22

## 2020-09-22 RX ORDER — FENTANYL CITRATE 50 UG/ML
INJECTION, SOLUTION INTRAMUSCULAR; INTRAVENOUS
Status: COMPLETED
Start: 2020-09-22 | End: 2020-09-22

## 2020-09-22 RX ORDER — LIDOCAINE HYDROCHLORIDE AND EPINEPHRINE 10; 10 MG/ML; UG/ML
INJECTION, SOLUTION INFILTRATION; PERINEURAL
Status: DISCONTINUED
Start: 2020-09-22 | End: 2020-09-22 | Stop reason: HOSPADM

## 2020-09-22 RX ORDER — DEXAMETHASONE SODIUM PHOSPHATE 10 MG/ML
INJECTION, SOLUTION INTRAMUSCULAR; INTRAVENOUS PRN
Status: DISCONTINUED | OUTPATIENT
Start: 2020-09-22 | End: 2020-09-22 | Stop reason: SDUPTHER

## 2020-09-22 RX ORDER — PROPOFOL 10 MG/ML
INJECTION, EMULSION INTRAVENOUS PRN
Status: DISCONTINUED | OUTPATIENT
Start: 2020-09-22 | End: 2020-09-22 | Stop reason: SDUPTHER

## 2020-09-22 RX ORDER — MEPERIDINE HYDROCHLORIDE 50 MG/ML
12.5 INJECTION INTRAMUSCULAR; INTRAVENOUS; SUBCUTANEOUS EVERY 5 MIN PRN
Status: DISCONTINUED | OUTPATIENT
Start: 2020-09-22 | End: 2020-09-22 | Stop reason: HOSPADM

## 2020-09-22 RX ORDER — MIDAZOLAM HYDROCHLORIDE 1 MG/ML
INJECTION INTRAMUSCULAR; INTRAVENOUS PRN
Status: DISCONTINUED | OUTPATIENT
Start: 2020-09-22 | End: 2020-09-22 | Stop reason: SDUPTHER

## 2020-09-22 RX ORDER — TRIAMCINOLONE ACETONIDE 40 MG/ML
INJECTION, SUSPENSION INTRA-ARTICULAR; INTRAMUSCULAR
Status: DISCONTINUED
Start: 2020-09-22 | End: 2020-09-22 | Stop reason: HOSPADM

## 2020-09-22 RX ORDER — ACETAMINOPHEN 500 MG
1000 TABLET ORAL ONCE
Status: COMPLETED | OUTPATIENT
Start: 2020-09-22 | End: 2020-09-22

## 2020-09-22 RX ORDER — NEOSTIGMINE METHYLSULFATE 5 MG/5 ML
SYRINGE (ML) INTRAVENOUS PRN
Status: DISCONTINUED | OUTPATIENT
Start: 2020-09-22 | End: 2020-09-22 | Stop reason: SDUPTHER

## 2020-09-22 RX ORDER — SCOLOPAMINE TRANSDERMAL SYSTEM 1 MG/1
1 PATCH, EXTENDED RELEASE TRANSDERMAL ONCE
Status: DISCONTINUED | OUTPATIENT
Start: 2020-09-22 | End: 2020-09-22 | Stop reason: HOSPADM

## 2020-09-22 RX ORDER — FENTANYL CITRATE 50 UG/ML
INJECTION, SOLUTION INTRAMUSCULAR; INTRAVENOUS PRN
Status: DISCONTINUED | OUTPATIENT
Start: 2020-09-22 | End: 2020-09-22 | Stop reason: SDUPTHER

## 2020-09-22 RX ORDER — GLYCOPYRROLATE 1 MG/5 ML
SYRINGE (ML) INTRAVENOUS PRN
Status: DISCONTINUED | OUTPATIENT
Start: 2020-09-22 | End: 2020-09-22 | Stop reason: SDUPTHER

## 2020-09-22 RX ORDER — SODIUM CHLORIDE 0.9 % (FLUSH) 0.9 %
10 SYRINGE (ML) INJECTION EVERY 12 HOURS SCHEDULED
Status: DISCONTINUED | OUTPATIENT
Start: 2020-09-22 | End: 2020-09-22 | Stop reason: HOSPADM

## 2020-09-22 RX ORDER — FENTANYL CITRATE 50 UG/ML
100 INJECTION, SOLUTION INTRAMUSCULAR; INTRAVENOUS ONCE
Status: COMPLETED | OUTPATIENT
Start: 2020-09-22 | End: 2020-09-22

## 2020-09-22 RX ADMIN — ROCURONIUM BROMIDE 50 MG: 10 INJECTION INTRAVENOUS at 08:16

## 2020-09-22 RX ADMIN — MIDAZOLAM HYDROCHLORIDE 2 MG: 1 INJECTION, SOLUTION INTRAMUSCULAR; INTRAVENOUS at 08:13

## 2020-09-22 RX ADMIN — PHENYLEPHRINE HYDROCHLORIDE 100 MCG: 10 INJECTION INTRAVENOUS at 09:07

## 2020-09-22 RX ADMIN — FENTANYL CITRATE 50 MCG: 50 INJECTION, SOLUTION INTRAMUSCULAR; INTRAVENOUS at 07:39

## 2020-09-22 RX ADMIN — PROPOFOL 200 MG: 10 INJECTION, EMULSION INTRAVENOUS at 08:16

## 2020-09-22 RX ADMIN — DEXAMETHASONE SODIUM PHOSPHATE 10 MG: 10 INJECTION, SOLUTION INTRAMUSCULAR; INTRAVENOUS at 08:36

## 2020-09-22 RX ADMIN — Medication 0.2 MG: at 09:18

## 2020-09-22 RX ADMIN — MIDAZOLAM HYDROCHLORIDE 2 MG: 1 INJECTION, SOLUTION INTRAMUSCULAR; INTRAVENOUS at 07:38

## 2020-09-22 RX ADMIN — CEFAZOLIN 2 G: 10 INJECTION, POWDER, FOR SOLUTION INTRAVENOUS at 08:27

## 2020-09-22 RX ADMIN — BUPIVACAINE HYDROCHLORIDE 10 ML: 5 INJECTION, SOLUTION EPIDURAL; INTRACAUDAL; PERINEURAL at 07:48

## 2020-09-22 RX ADMIN — BUPIVACAINE 10 ML: 13.3 INJECTION, SUSPENSION, LIPOSOMAL INFILTRATION at 07:48

## 2020-09-22 RX ADMIN — Medication 1000 MG: at 07:13

## 2020-09-22 RX ADMIN — Medication 2 MG: at 09:14

## 2020-09-22 RX ADMIN — ONDANSETRON 4 MG: 2 INJECTION, SOLUTION INTRAMUSCULAR; INTRAVENOUS at 09:11

## 2020-09-22 RX ADMIN — MIDAZOLAM HYDROCHLORIDE 2 MG: 1 INJECTION INTRAMUSCULAR; INTRAVENOUS at 07:38

## 2020-09-22 RX ADMIN — KETOROLAC TROMETHAMINE 30 MG: 30 INJECTION, SOLUTION INTRAMUSCULAR; INTRAVENOUS at 09:14

## 2020-09-22 RX ADMIN — ACETAMINOPHEN 1000 MG: 500 TABLET ORAL at 07:13

## 2020-09-22 RX ADMIN — PHENYLEPHRINE HYDROCHLORIDE 100 MCG: 10 INJECTION INTRAVENOUS at 08:49

## 2020-09-22 RX ADMIN — FENTANYL CITRATE 50 MCG: 50 INJECTION INTRAMUSCULAR; INTRAVENOUS at 07:39

## 2020-09-22 RX ADMIN — LIDOCAINE HYDROCHLORIDE 50 MG: 10 INJECTION, SOLUTION INFILTRATION; PERINEURAL at 08:16

## 2020-09-22 RX ADMIN — PHENYLEPHRINE HYDROCHLORIDE 100 MCG: 10 INJECTION INTRAVENOUS at 08:41

## 2020-09-22 RX ADMIN — PHENYLEPHRINE HYDROCHLORIDE 200 MCG: 10 INJECTION INTRAVENOUS at 08:55

## 2020-09-22 RX ADMIN — SODIUM CHLORIDE, POTASSIUM CHLORIDE, SODIUM LACTATE AND CALCIUM CHLORIDE: 600; 310; 30; 20 INJECTION, SOLUTION INTRAVENOUS at 07:31

## 2020-09-22 RX ADMIN — Medication 0.4 MG: at 09:14

## 2020-09-22 RX ADMIN — Medication 1 MG: at 09:18

## 2020-09-22 ASSESSMENT — PULMONARY FUNCTION TESTS
PIF_VALUE: 20
PIF_VALUE: 17
PIF_VALUE: 17
PIF_VALUE: 2
PIF_VALUE: 20
PIF_VALUE: 21
PIF_VALUE: 22
PIF_VALUE: 22
PIF_VALUE: 21
PIF_VALUE: 1
PIF_VALUE: 21
PIF_VALUE: 22
PIF_VALUE: 1
PIF_VALUE: 23
PIF_VALUE: 22
PIF_VALUE: 23
PIF_VALUE: 1
PIF_VALUE: 21
PIF_VALUE: 22
PIF_VALUE: 21
PIF_VALUE: 19
PIF_VALUE: 22
PIF_VALUE: 15
PIF_VALUE: 21
PIF_VALUE: 21
PIF_VALUE: 22
PIF_VALUE: 0
PIF_VALUE: 21
PIF_VALUE: 21
PIF_VALUE: 20
PIF_VALUE: 16
PIF_VALUE: 21
PIF_VALUE: 21
PIF_VALUE: 20
PIF_VALUE: 22
PIF_VALUE: 21
PIF_VALUE: 17
PIF_VALUE: 21
PIF_VALUE: 1
PIF_VALUE: 22
PIF_VALUE: 17
PIF_VALUE: 22
PIF_VALUE: 22
PIF_VALUE: 16
PIF_VALUE: 21
PIF_VALUE: 20
PIF_VALUE: 7
PIF_VALUE: 21
PIF_VALUE: 20
PIF_VALUE: 19
PIF_VALUE: 18
PIF_VALUE: 21
PIF_VALUE: 22
PIF_VALUE: 21
PIF_VALUE: 23
PIF_VALUE: 20
PIF_VALUE: 21
PIF_VALUE: 21
PIF_VALUE: 19
PIF_VALUE: 22
PIF_VALUE: 2
PIF_VALUE: 18
PIF_VALUE: 17
PIF_VALUE: 17
PIF_VALUE: 6
PIF_VALUE: 21
PIF_VALUE: 22
PIF_VALUE: 20
PIF_VALUE: 21
PIF_VALUE: 20

## 2020-09-22 ASSESSMENT — PAIN - FUNCTIONAL ASSESSMENT: PAIN_FUNCTIONAL_ASSESSMENT: 0-10

## 2020-09-22 ASSESSMENT — PAIN SCALES - GENERAL
PAINLEVEL_OUTOF10: 0
PAINLEVEL_OUTOF10: 0

## 2020-09-22 NOTE — ANESTHESIA POSTPROCEDURE EVALUATION
Department of Anesthesiology  Postprocedure Note    Patient: Alcira Tiwari  MRN: [id number]  YOB: 1970  Date of evaluation: 9/22/2020  Time:  10:49 AM     Procedure Summary     Date:  09/22/20 Room / Location:  [address] [place] [address] 03 / [address] [facility]    Anesthesia Start:  0896 Anesthesia Stop:  0819    Procedure:  SHOULDER ARTHROSCOPY - CAPSULAR RELEASE WITH MANIPULATION UNDER ANESTHESIA AND PRE OP INTERSCALENE BLOC (Right ) Diagnosis:  (RIGHT SHOULDER ADHESIVE CAPSULITIS)    Surgeon:  Wood Pedro MD Responsible Provider:  YOBANY Moran CRNA    Anesthesia Type:  general ASA Status:  2          Anesthesia Type: general    Claudette Phase I: Claudette Score: 8    Claudette Phase II: Claudette Score: 9    Last vitals: Reviewed and per EMR flowsheets.        Anesthesia Post Evaluation    Patient location during evaluation: PACU  Patient participation: complete - patient participated  Level of consciousness: awake and alert  Pain score: 0  Airway patency: patent  Nausea & Vomiting: no nausea and no vomiting  Complications: no  Cardiovascular status: blood pressure returned to baseline  Respiratory status: acceptable and room air  Hydration status: euvolemic

## 2020-09-22 NOTE — ANESTHESIA PRE PROCEDURE
Department of Anesthesiology  Preprocedure Note       Name:  Candace Khan   Age:  44 y.o.  :  1970                                          MRN:  [id number]         Date:  2020      Surgeon: Ilda Fields):  Joanna Jensen MD    Procedure: Procedure(s):  SHOULDER ARTHROSCOPY - CAPSULAR RELEASE WITH MANIPULATION UNDER ANESTHESIA AND PRE OP INTERSCALENE BLOC    Medications prior to admission:   Prior to Admission medications    Medication Sig Start Date End Date Taking? Authorizing Provider   levothyroxine (SYNTHROID) 150 MCG tablet TAKE ONE TABLET BY MOUTH DAILY 20  Yes Hood Harris MD   PRILOSEC OTC 20 MG tablet TAKE ONE TABLET BY MOUTH DAILY 19  Yes Hood Harris MD   HYDROcodone-acetaminophen (NORCO) 5-325 MG per tablet Take 1 tablet by mouth every 4 hours as needed for Pain for up to 7 days.  20  Joanna Jensen MD   ondansetron (ZOFRAN) 4 MG tablet Take 1 tablet by mouth daily as needed for Nausea or Vomiting 20   Joanna Jensen MD   venlafaxine (EFFEXOR XR) 75 MG extended release capsule Take 1 capsule by mouth daily 20   Hood Harris MD       Current medications:    Current Facility-Administered Medications   Medication Dose Route Frequency Provider Last Rate Last Dose    0.9 % sodium chloride infusion   Intravenous Continuous Maria D Mckeon MD        lactated ringers infusion   Intravenous Continuous Maria D Mckeon MD        ceFAZolin (ANCEF) 2 g in dextrose 5 % 50 mL IVPB  2 g Intravenous On Call to [facility] [address], MD        dexamethasone (PF) (DECADRON) injection 10 mg  10 mg Intravenous Once Joanna Jensen MD        sodium chloride flush 0.9 % injection 10 mL  10 mL Intravenous 2 times per day Joanna Jensen MD        sodium chloride flush 0.9 % injection 10 mL  10 mL Intravenous PRN Joanna Jensen MD        ceFAZolin (ANCEF) IVPB                Allergies:  No Known Allergies    Problem List:    Patient Active Problem List   Diagnosis Code    Hypothyroidism due to Hashimoto's thyroiditis E03.8, E06.3    Closed fracture of right distal radius S52.501A    Closed displaced fracture of greater tuberosity of right humerus S42.251A       Past Medical History:        Diagnosis Date    Abnormal Pap smear of cervix     GERD (gastroesophageal reflux disease)     Hypothyroidism     PONV (postoperative nausea and vomiting)        Past Surgical History:        Procedure Laterality Date    CHOLECYSTECTOMY  05/2019    HUMERUS FRACTURE SURGERY Right 5/5/2020    HUMERUS OPEN REDUCTION INTERNAL FIXATION performed by Mendez Emanuel MD at [address] [facility]      x3   [facility] Right 5/5/2020    WRIST OPEN REDUCTION INTERNAL FIXATION performed by Mendez Emanuel MD at [facility] History:    Social History     Tobacco Use    Smoking status: Former Smoker     Last attempt to quit: 2/24/2010     Years since quitting: 10.5    Smokeless tobacco: Never Used   Substance Use Topics    Alcohol use: Yes     Comment: rarely                                Counseling given: Not Answered      Vital Signs (Current):   Vitals:    09/22/20 0652 09/22/20 0703   BP: 124/80    Pulse: 74 78   Resp: 14    Temp: 98.1 °F (36.7 °C)    TempSrc: Temporal    SpO2: 100%    Weight: 218 lb 6.4 oz (99.1 kg)    Height: 5' 7\" (1.702 m)                                               BP Readings from Last 3 Encounters:   09/22/20 124/80   09/14/20 120/68   05/05/20 127/70       NPO Status: Time of last liquid consumption: 0545                        Time of last solid consumption: 2130                        Date of last liquid consumption: 09/22/20                        Date of last solid food consumption: 09/21/20    BMI:   Wt Readings from Last 3 Encounters:   09/22/20 218 lb 6.4 oz (99.1 kg)   09/14/20 214 lb (97.1 kg)   08/31/20 200 lb (90.7 kg)     Body mass index is 34.21 kg/m².     CBC:   Lab Results   Component Value Date    WBC 5.4 09/10/2020    RBC 4.39 09/10/2020    HGB 13.4 09/10/2020    HCT 42.3 09/10/2020    MCV 96.4 09/10/2020    RDW 11.9 09/10/2020     09/10/2020       CMP:   Lab Results   Component Value Date     09/10/2020    K 4.3 09/10/2020     09/10/2020    CO2 22 09/10/2020    BUN 19 09/10/2020    CREATININE 0.66 09/10/2020    GFRAA >60 09/10/2020    LABGLOM >60 09/10/2020    GLUCOSE 87 09/10/2020    GLUCOSE 77 03/16/2012    PROT 7.8 04/30/2019    CALCIUM 9.2 09/10/2020    BILITOT 0.20 04/30/2019    ALKPHOS 86 04/30/2019    AST 20 04/30/2019    ALT 31 04/30/2019       POC Tests: No results for input(s): POCGLU, POCNA, POCK, POCCL, POCBUN, POCHEMO, POCHCT in the last 72 hours. Coags: No results found for: PROTIME, INR, APTT    HCG (If Applicable):   Lab Results   Component Value Date    HCG NEGATIVE 05/05/2020        ABGs: No results found for: PHART, PO2ART, EMO5PQQ, PXM0MMI, BEART, O5HBFHVB     Type & Screen (If Applicable):  No results found for: LABABO, LABRH    Drug/Infectious Status (If Applicable):  No results found for: HIV, HEPCAB    COVID-19 Screening (If Applicable):   Lab Results   Component Value Date    COVID19 Not Detected 09/18/2020    COVID19 Not Detected 05/02/2020         Anesthesia Evaluation  Patient summary reviewed and Nursing notes reviewed   history of anesthetic complications: PONV. Airway: Mallampati: II  TM distance: >3 FB   Neck ROM: full  Mouth opening: > = 3 FB Dental:          Pulmonary:Negative Pulmonary ROS and normal exam  breath sounds clear to auscultation                             Cardiovascular:  Exercise tolerance: good (>4 METS),           Rhythm: regular  Rate: normal           Beta Blocker:  Not on Beta Blocker         Neuro/Psych:   Negative Neuro/Psych ROS              GI/Hepatic/Renal:   (+) GERD: no interval change,           Endo/Other:    (+) hypothyroidism::., .                 Abdominal:           Vascular: negative vascular ROS.                                      Anesthesia

## 2020-09-22 NOTE — ANESTHESIA PROCEDURE NOTES
Peripheral Block    Patient location during procedure: pre-op  Start time: 9/22/2020 7:44 AM  End time: 9/22/2020 7:50 AM  Staffing  Anesthesiologist: Jose Aldana MD  Performed: anesthesiologist   Preanesthetic Checklist  Completed: patient identified, site marked, surgical consent, pre-op evaluation, timeout performed, IV checked, risks and benefits discussed, monitors and equipment checked, anesthesia consent given, oxygen available and patient being monitored  Peripheral Block  Patient position: supine  Prep: ChloraPrep  Patient monitoring: cardiac monitor, continuous pulse ox, frequent blood pressure checks and IV access  Block type: Brachial plexus  Laterality: right  Injection technique: single-shot  Procedures: ultrasound guided  Local infiltration: bupivacaine  Infiltration strength: 0.5 %  Dose: 1.5 mL  Interscalene  Provider prep: mask and sterile gloves  Expiration date: 4/30/2025  Local infiltration: bupivacaine  Needle  Needle type: combined needle/nerve stimulator   Needle gauge: 22 G  Needle localization: ultrasound guidance  Needle insertion depth: 2 cm  Lot number: [id number]  Assessment  Injection assessment: negative aspiration for heme, no paresthesia on injection and local visualized surrounding nerve on ultrasound  Paresthesia pain: none  Slow fractionated injection: yes  Hemodynamics: stable  Medications Administered  Bupivacaine (MARCAINE) PF injection 0.5%, 10 mL  bupivacaine liposome (EXPAREL) injection 1.3%, 10 mL  Reason for block: post-op pain management and at surgeon's request

## 2020-09-22 NOTE — OP NOTE
and Tegaderm. Her arm was placed in a sling, she was awoken, extubated, transferred to her  bed and wheeled to recovery in stable condition.          Complications: None     Post-operative Condition: Stable

## 2020-09-22 NOTE — BRIEF OP NOTE
Brief Postoperative Note      Patient: Jasmyn Hogue  YOB: 1970  MRN: [id number]    Date of Procedure: 9/22/2020    Pre-Op Diagnosis: RIGHT SHOULDER ADHESIVE CAPSULITIS    Post-Op Diagnosis: Same       Procedure(s):  SHOULDER ARTHROSCOPY - CAPSULAR RELEASE WITH MANIPULATION UNDER ANESTHESIA AND PRE OP INTERSCALENE BLOC    Surgeon(s):  Richmond Hennessy MD    Assistant:  Resident: Augusto Velez DO    Anesthesia: General    Estimated Blood Loss (mL): Minimal    Complications: None    Specimens:   * No specimens in log *    Implants:  * No implants in log *      Drains: * No LDAs found *    Findings: See operative report    Electronically signed by Richmond Hennessy MD on 9/22/2020 at 9:31 AM

## 2020-09-22 NOTE — H&P
Update History & Physical    The patient's History and Physical of September 14, 2020 was reviewed with the patient and I examined the patient. There was no change. The surgical site was confirmed by the patient and me. Heart: RRR  Lungs: CTA Bilaterally    Plan: The risks, benefits, expected outcome, and alternative to the recommended procedure have been discussed with the patient. Patient understands and wants to proceed with the procedure.      Electronically signed by Joanna Jensen MD on 9/22/2020 at 7:19 AM

## 2020-09-23 ENCOUNTER — HOSPITAL ENCOUNTER (OUTPATIENT)
Dept: PHYSICAL THERAPY | Facility: CLINIC | Age: 50
Setting detail: THERAPIES SERIES
Discharge: HOME | End: 2020-09-23
Payer: COMMERCIAL

## 2020-09-23 PROCEDURE — 97110 THERAPEUTIC EXERCISES: CPT

## 2020-09-23 NOTE — FLOWSHEET NOTE
[x] [facility]  Outpatient Rehabilitation &  Therapy  [facility]   [place]: [phone]  [place]: [phone]      Physical Therapy Daily Treatment Note/ReEval    Date:  2020  Patient Name:  Rafi Hernadez    :  1970  MRN: [id number]  Physician: Dr. Kaitlin Cantumer: Saadia Santa (authorized 18 visits from 2020 to 2020) (6 additional visits approved for total of 24 through 20)  Medical Diagnosis: closed displaced fracture of the greater tuberosity of right humerus, closed fracture distal end of right radius        Date of surgery 2020          Rehab Codes: M79.621, M79.641, M62.511, M62.531, M25.511, MM25.531  Onset Date:  2020          Next 's appt: 20    Visit# / total visits:     Cancels/No Shows: 0/0    Subjective:    Pain:  [] Yes  [x] No Location: R shoulder and R wrist Pain Rating: (0-10 scale) 0/10  Pain altered Tx:  [x] No  [] Yes  Action:  Comments: Patient arrived stating she just had surgery yesterday. Dr. Shaina Rudolph went in and debrided and released the capsule. Her R UE is still numb. Objective:  Exercises:    Exercise    RUE ORIF Reps/ Time Weight/ Level Comments    Pulleys 3'/3'  Flex/abd  x          *Table slides 5x30\" Standing  Flex/Abd    Towel IR S 10x10\"   x   ER  Sleeper S 10x10\"   x   Mat       SL ER 2x10 4#     SL Abd 2x10 4#     Supine Punches 2x10      Supine ABCs x      Wand flex 30\"x5   x   Wand Abd 30\"x5   x   Wand ER 30\"x5     x   Prone scap retrain-retraction/depression 2x20   x   Standing       *Wall walks fwd/abd 30\"x5    x          TBand       *Retraction 2x10 purple     *Ext 2x10 purple     *ER/IR 2x10 purple (IR) blue (ER)     *Zechariah ER 2x10 purple     *HAB 2x10 purple      Other:    Manual PROM all planes to tolerance and limits. PROM WNL all planes.       Specific Instructions for next treatment: Progress ROM as tolerated    Treatment Charges: Mins Units   []  Modalities     [x]  Ther Exercise 40 3   []  Manual Therapy     []  Ther Activities     []  Aquatics     [x]  Vasocompression 15 1   []  Other     Total Treatment time 55 4       Assessment: [x] Progressing toward goals. Pt has full PROM all planes at the shoulder this date. Heavy focus on PROM and self stretching. Educated pt need to stretch multiple times per day. Started back with scap retrain as well as scapula is rotating too soon with humeral movement     [] No change. [] Other:     [x] Patient would continue to benefit from skilled physical therapy services in order to: reduce edema and increase RUE ROM/strength. STG: (to be met in 8 treatments)  1. ? Pain: 2/10 or less as she initiates more use of right UE.  2. ? ROM: PROM of right shoulder flexion and abduction to 90.  3. Functional AROM of right wrist flexion, extension, able to supinate to 45 degrees  4. Minimal edema in right hand  5. Patient to be independent with home exercise program as demonstrated by performance with correct form without cues. 6. Demonstrate Knowledge of fall prevention, met 6-1-2020 low fall risk per Felipe Montenegro, no intervention needed    LTG: (to be met in 16 treatments)  1. Functional AROM of right shoulder to raise arms for dressing and grooming. 2. -4/5 strength in right shoulder to allow her to hold up arms and use tools for hair dressing, blow dryer, etc  3. Ability to fully close right hand and  20# or more of strength  4. No edema in right hand  5. Resume sleeping supine in her bed (vs. Recliner)  6. Resume driving  7. Ability to perform fine motor tasks like buttons, zippers    Pt. Education:  [x] Yes  [] No  [] Reviewed Prior HEP/Ed  Method of Education: [x] Verbal  [] Demo  [] Written  Comprehension of Education:   [x] Verbalizes understanding. [] Demonstrates understanding. [] Needs review. [] Demonstrates/verbalizes HEP/Ed previously given. Plan: [x] Continue current frequency toward long and short term goals.      [x] Specific Instructions for subsequent treatments: continue to focus on ROM       Time In: 1410             Time Out: 1505    Electronically signed by:  Tian Gallardo PT

## 2020-09-24 ENCOUNTER — HOSPITAL ENCOUNTER (OUTPATIENT)
Dept: PHYSICAL THERAPY | Facility: CLINIC | Age: 50
Setting detail: THERAPIES SERIES
Discharge: HOME | End: 2020-09-24
Payer: COMMERCIAL

## 2020-09-24 ENCOUNTER — TELEPHONE (OUTPATIENT)
Dept: ORTHOPEDIC SURGERY | Age: 50
End: 2020-09-24

## 2020-09-24 PROCEDURE — 97110 THERAPEUTIC EXERCISES: CPT

## 2020-09-24 PROCEDURE — 97016 VASOPNEUMATIC DEVICE THERAPY: CPT

## 2020-09-24 NOTE — FLOWSHEET NOTE
[x] [facility]  Outpatient Rehabilitation &  Therapy  [facility]   [place]: [phone]  F: [phone]      Physical Therapy Daily Treatment Note    Date:  2020  Patient Name:  Aundrea Zuluaga    :  1970  MRN: [id number]  Physician: Dr. Negrete Ek: Sophie Rodriguez (authorized 18 visits from 2020 to 2020) (6 additional visits approved for total of 24 through 20)  Medical Diagnosis: closed displaced fracture of the greater tuberosity of right humerus, closed fracture distal end of right radius        Date of surgery 2020          Rehab Codes: M79.621, M79.641, M62.511, M62.531, M25.511, MM25.531  Onset Date:  2020          Next 's appt: 20    Visit# / total visits:     Cancels/No Shows: 0/0     Subjective:    Pain:  [] Yes  [x] No Location: R shoulder and R wrist Pain Rating: (0-10 scale) 0/10  Pain altered Tx:  [x] No  [] Yes  Action:  Comments: Patient arrived stating that her arm is still numb. Notes slight improvements in numbness but still unable to actively move RUE. Objective:  Exercises:    Exercise    RUE ORIF Reps/ Time Weight/ Level Comments    Pulleys 3'/3'  Flex/abd  x          *Table slides 5x30\" Standing  Flex/Abd    Towel IR S 10x10\"   x   ER  Sleeper S 10x10\"   x   Mat       SL ER 2x10 4#     SL Abd 2x10 4#     Supine Punches 2x10      Supine ABCs x      Wand flex 30\"x5   x   Wand Abd 30\"x5   x   Wand ER 30\"x5     x   Prone scap retrain-retraction/depression 2x20   x   Standing       *Wall walks fwd/abd 30\"x5    x          TBand       *Retraction 2x10 purple     *Ext 2x10 purple     *ER/IR 2x10 purple (IR) blue (ER)     *Zechariah ER 2x10 purple     *HAB 2x10 purple      Other:    Manual PROM all planes to tolerance and limits. PROM WNL all planes.       Specific Instructions for next treatment: Progress ROM as tolerated    Treatment Charges: Mins Units   []  Modalities     [x]  Ther Exercise 30 2   [] Manual Therapy     []  Ther Activities     []  Aquatics     [x]  Vasocompression 15 1   []  Other     Total Treatment time 45 3       Assessment: [x] Progressing toward goals. Patient still maintaining full PROM this date. Stressed importance of continued ROM exercises at home. Plan to further progress strength program as numbness subsides. [] No change. [] Other:     [x] Patient would continue to benefit from skilled physical therapy services in order to: reduce edema and increase RUE ROM/strength. STG: (to be met in 8 treatments)  1. ? Pain: 2/10 or less as she initiates more use of right UE.  2. ? ROM: PROM of right shoulder flexion and abduction to 90.  3. Functional AROM of right wrist flexion, extension, able to supinate to 45 degrees  4. Minimal edema in right hand  5. Patient to be independent with home exercise program as demonstrated by performance with correct form without cues. 6. Demonstrate Knowledge of fall prevention, met 6-1-2020 low fall risk per Beulah Spurling, no intervention needed    LTG: (to be met in 16 treatments)  1. Functional AROM of right shoulder to raise arms for dressing and grooming. 2. -4/5 strength in right shoulder to allow her to hold up arms and use tools for hair dressing, blow dryer, etc  3. Ability to fully close right hand and  20# or more of strength  4. No edema in right hand  5. Resume sleeping supine in her bed (vs. Recliner)  6. Resume driving  7. Ability to perform fine motor tasks like buttons, zippers    Pt. Education:  [x] Yes  [] No  [] Reviewed Prior HEP/Ed  Method of Education: [x] Verbal  [] Demo  [] Written  Comprehension of Education:   [x] Verbalizes understanding. [] Demonstrates understanding. [] Needs review. [] Demonstrates/verbalizes HEP/Ed previously given. Plan: [x] Continue current frequency toward long and short term goals.      [x] Specific Instructions for subsequent treatments: continue to focus on ROM       Time In: 1400 Time Out: 3869    Electronically signed by:  Neo Gonzalez, PTA

## 2020-09-24 NOTE — TELEPHONE ENCOUNTER
Spoke with patient. She had therapy yesterday, has it today, and tomorrow. She is still numb from her nerve block but doing well otherwise doing well. Denied any other questions or concerns.

## 2020-09-25 ENCOUNTER — HOSPITAL ENCOUNTER (OUTPATIENT)
Dept: PHYSICAL THERAPY | Facility: CLINIC | Age: 50
Setting detail: THERAPIES SERIES
Discharge: HOME | End: 2020-09-25
Payer: COMMERCIAL

## 2020-09-25 ENCOUNTER — TELEPHONE (OUTPATIENT)
Dept: ORTHOPEDIC SURGERY | Age: 50
End: 2020-09-25

## 2020-09-25 PROCEDURE — 97110 THERAPEUTIC EXERCISES: CPT

## 2020-09-25 PROCEDURE — 97016 VASOPNEUMATIC DEVICE THERAPY: CPT

## 2020-09-25 NOTE — TELEPHONE ENCOUNTER
She is still reporting being numb today. She stated you had told her 36-48 was normal but she is going on 4 days. She can't really raise her arm because the muscle is numb. She has good rotation. Any ideas?

## 2020-09-28 ENCOUNTER — HOSPITAL ENCOUNTER (OUTPATIENT)
Dept: PHYSICAL THERAPY | Facility: CLINIC | Age: 50
Setting detail: THERAPIES SERIES
Discharge: HOME | End: 2020-09-28
Payer: COMMERCIAL

## 2020-09-28 PROCEDURE — 97110 THERAPEUTIC EXERCISES: CPT

## 2020-09-28 PROCEDURE — 97140 MANUAL THERAPY 1/> REGIONS: CPT

## 2020-09-28 PROCEDURE — 97016 VASOPNEUMATIC DEVICE THERAPY: CPT

## 2020-09-28 NOTE — FLOWSHEET NOTE
[x] [facility] [place] [facility]""  Outpatient Rehabilitation &  Therapy  [facility]   [place]: [phone]  F: [phone]      Physical Therapy Daily Treatment Note    Date:  2020  Patient Name:  Teri Baeza    :  1970  MRN: [id number]  Physician: Dr. Boni Nuñez: Maximino Handsome (authorized 18 visits from 2020 to 2020) (6 additional visits approved for total of 24 through 20)  Medical Diagnosis: closed displaced fracture of the greater tuberosity of right humerus, closed fracture distal end of right radius        Date of surgery 2020          Rehab Codes: M79.621, M79.641, M62.511, M62.531, M25.511, MM25.531  Onset Date:  2020          Next 's appt: 20    Visit# / total visits:     Cancels/No Shows: 0/0     Subjective:    Pain:  [] Yes  [x] No Location: R shoulder and R wrist Pain Rating: (0-10 scale) 0/10  Pain altered Tx:  [x] No  [] Yes  Action:  Comments: Patient reports the sensation in her R arm is back. It was starting to come back on Friday post surgery. I still get a catch at the R shoulder with any movement that I try overhead. Once I get past the catch then I'm fine and I can move.  I feel very weak and can feel my shoulder coming up into my ear with      Objective:  Exercises:    Exercise    RUE ORIF Reps/ Time Weight/ Level Comments    Pulleys 4'/4'  Flex/abd  x          *Table slides 5x30\" Standing  Flex/Abd    Towel IR S 10x10\"   x   ER  Sleeper S 10x10\"   x   Mat       SL ER x15 0#  x   SL Abd x15 0#  x   SL flex x15 0#  x   SL HAB 3x5 0#  x   Supine Punches 2x10      Supine ABCs x      Wand flex 30\"x5      Wand Abd 30\"x5      Wand ER 30\"x5        Prone scap retrain-retraction/depression 2x20      Standing       *Wall walks fwd/abd 30\"x5    x          TBand       *Retraction 2x10 purple     *Ext 2x10 purple     *ER/IR 2x10 purple (IR) blue (ER)     *Zechariah ER 2x10 purple     *HAB 2x10 purple      PROM   Flex 168  Abd 168  ER 80  IR 70  AROM seated  Flex 75  Abd 60      Specific Instructions for next treatment: Progress ROM as tolerated    Treatment Charges: Mins Units   []  Modalities     [x]  Ther Exercise 35 2   [x]  Manual Therapy 15 1   []  Ther Activities     []  Aquatics     [x]  Vasocompression 15 1   []  Other     Total Treatment time 65 4       Assessment: [x] Progressing toward goals. Pt is tight at R shoulder vs last week the day after manipulation but after GHJ mobilization pt's shoulder loosened and PROM is as above. As seen by AROM in seated pt is still very weak. She was able to tolerate more sidelying strengthening with less pain but just fatigues quickly. She was issued the sidelying exercises for HEP and is instructed to do them daily. [] No change. [] Other:     [x] Patient would continue to benefit from skilled physical therapy services in order to: reduce edema and increase RUE ROM/strength. STG: (to be met in 8 treatments)  1. ? Pain: 2/10 or less as she initiates more use of right UE.  2. ? ROM: PROM of right shoulder flexion and abduction to 90.  3. Functional AROM of right wrist flexion, extension, able to supinate to 45 degrees  4. Minimal edema in right hand  5. Patient to be independent with home exercise program as demonstrated by performance with correct form without cues. 6. Demonstrate Knowledge of fall prevention, met 6-1-2020 low fall risk per Shirley Alonzo, no intervention needed    LTG: (to be met in 16 treatments)  1. Functional AROM of right shoulder to raise arms for dressing and grooming. 2. -4/5 strength in right shoulder to allow her to hold up arms and use tools for hair dressing, blow dryer, etc  3. Ability to fully close right hand and  20# or more of strength  4. No edema in right hand  5. Resume sleeping supine in her bed (vs. Recliner)  6. Resume driving  7. Ability to perform fine motor tasks like buttons, zippers    Pt.  Education:  [x] Yes  [] No  [] Reviewed Prior

## 2020-09-28 NOTE — TELEPHONE ENCOUNTER
Talked with patient and she wanted to know if this is something that she needs to have done right away as these tests are pricey.     If so she will have the test done at [facility] (who stated that the order had to be with and without contrast per the protocol)    Dr Claudell Grimmer informed and stated that patient does not have to do this right away throat pain/swelling

## 2020-10-01 ENCOUNTER — HOSPITAL ENCOUNTER (OUTPATIENT)
Dept: PHYSICAL THERAPY | Facility: CLINIC | Age: 50
Setting detail: THERAPIES SERIES
Discharge: HOME | End: 2020-10-01
Payer: COMMERCIAL

## 2020-10-01 PROCEDURE — 97140 MANUAL THERAPY 1/> REGIONS: CPT

## 2020-10-01 PROCEDURE — 97016 VASOPNEUMATIC DEVICE THERAPY: CPT

## 2020-10-01 PROCEDURE — 97110 THERAPEUTIC EXERCISES: CPT

## 2020-10-01 NOTE — FLOWSHEET NOTE
[x] [facility]  Outpatient Rehabilitation &  Therapy  [facility]   [place]: [phone]  F: [phone]      Physical Therapy Daily Treatment Note    Date:  10/1/2020  Patient Name:  Ranjana Moreno    :  1970  MRN: [id number]  Physician: Dr. Raoul Hamman: Gina Kurtz (authorized 18 visits from 2020 to 2020) (6 additional visits approved for total of 24 through 20)  Medical Diagnosis: closed displaced fracture of the greater tuberosity of right humerus, closed fracture distal end of right radius        Date of surgery 2020          Rehab Codes: M79.621, M79.641, M62.511, M62.531, M25.511, MM25.531  Onset Date:  2020          Next 's appt: 20    Visit# / total visits: 25/    Cancels/No Shows: 0/0     Subjective:    Pain:  [] Yes  [x] No Location: R shoulder and R wrist Pain Rating: (0-10 scale) 0/10  Pain altered Tx:  [x] No  [] Yes  Action:  Comments: Patient arrived noting continued stiffness upon arrival, notes feeling overall strength improvement.      Objective:  Exercises:    Exercise    RUE ORIF Reps/ Time Weight/ Level Comments    Pulleys 4'/4'  Flex/abd  x          *Table slides 5x30\" Standing  Flex/Abd    Towel IR S 10x10\"   x   ER  Sleeper S 10x10\"   x   Mat       SL ER x15 0#  x   SL Abd x15 0#  x   SL flex x15 0#  x   SL HAB 3x5 0#  x   Supine Punches 2x10      Supine ABCs x      Wand flex 30\"x5      Wand Abd 30\"x5      Wand ER 30\"x5        Prone scap retrain-retraction/depression 2x20      Standing       *Wall walks fwd/abd 30\"x5    x          TBand       *Retraction 2x10 orange     *Ext 2x10 orange     *ER/IR 2x10 orange     *Zechariah ER       *HAB        PROM   Flex 168  Abd 168  ER 80  IR 70  AROM seated  Flex 75  Abd 60      Specific Instructions for next treatment: Progress ROM as tolerated    Treatment Charges: Mins Units   []  Modalities     [x]  Ther Exercise 35 2   [x]  Manual Therapy 15 1   []  Ther Activities []  Aquatics     [x]  Vasocompression 15 1   []  Other     Total Treatment time 65 4       Assessment: [x] Progressing toward goals. Increased stiffness noted at end ranges this date. Able to obtain full ROM post manual.     [] No change. [] Other:     [x] Patient would continue to benefit from skilled physical therapy services in order to: reduce edema and increase RUE ROM/strength. STG: (to be met in 8 treatments)  1. ? Pain: 2/10 or less as she initiates more use of right UE.  2. ? ROM: PROM of right shoulder flexion and abduction to 90.  3. Functional AROM of right wrist flexion, extension, able to supinate to 45 degrees  4. Minimal edema in right hand  5. Patient to be independent with home exercise program as demonstrated by performance with correct form without cues. 6. Demonstrate Knowledge of fall prevention, met 6-1-2020 low fall risk per Burnice Cone, no intervention needed    LTG: (to be met in 16 treatments)  1. Functional AROM of right shoulder to raise arms for dressing and grooming. 2. -4/5 strength in right shoulder to allow her to hold up arms and use tools for hair dressing, blow dryer, etc  3. Ability to fully close right hand and  20# or more of strength  4. No edema in right hand  5. Resume sleeping supine in her bed (vs. Recliner)  6. Resume driving  7. Ability to perform fine motor tasks like buttons, zippers    Pt. Education:  [x] Yes  [] No  [] Reviewed Prior HEP/Ed  Method of Education: [x] Verbal  [] Demo  [] Written  Comprehension of Education:   [x] Verbalizes understanding. [] Demonstrates understanding. [] Needs review. [] Demonstrates/verbalizes HEP/Ed previously given. Plan: [x] Continue current frequency toward long and short term goals.      [x] Specific Instructions for subsequent treatments: continue to focus on ROM and RTC strength       Time In: 1430            Time Out: 8551    Electronically signed by:  Mckenna Godoy PTA

## 2020-10-05 ENCOUNTER — HOSPITAL ENCOUNTER (OUTPATIENT)
Dept: PHYSICAL THERAPY | Facility: CLINIC | Age: 50
Setting detail: THERAPIES SERIES
Discharge: HOME | End: 2020-10-05
Payer: COMMERCIAL

## 2020-10-05 PROCEDURE — 97016 VASOPNEUMATIC DEVICE THERAPY: CPT

## 2020-10-05 PROCEDURE — 97110 THERAPEUTIC EXERCISES: CPT

## 2020-10-05 PROCEDURE — 97140 MANUAL THERAPY 1/> REGIONS: CPT

## 2020-10-05 NOTE — FLOWSHEET NOTE
[x] [facility]  Outpatient Rehabilitation &  Therapy  [facility]   [place]: [phone]  F: [phone]      Physical Therapy Daily Treatment Note    Date:  10/5/2020  Patient Name:  Beltran Rojas    :  1970  MRN: [id number]  Physician: Dr. Alston Codding: Nigel Mejia (authorized 18 visits from 2020 to 2020) (6 additional visits approved for total of 24 through 20)  Medical Diagnosis: closed displaced fracture of the greater tuberosity of right humerus, closed fracture distal end of right radius        Date of surgery 2020          Rehab Codes: M79.621, M79.641, M62.511, M62.531, M25.511, MM25.531  Onset Date:  2020          Next 's appt: 20    Visit# / total visits: 26/    Cancels/No Shows: 0/0     Subjective:    Pain:  [] Yes  [x] No Location: R shoulder and R wrist Pain Rating: (0-10 scale) 0/10  Pain altered Tx:  [x] No  [] Yes  Action:  Comments: Patient arrived today with no pain in right shoulder/arm when it is at rest but when pt. tries to actively move R shoulder pt. States that the pain is a 12/10. Pt. States that she had a bad weekend with R shoulder stating that when the shoulder is going into flexion, there is a burning pain that just stops it and pt. cannot push through it.      Objective:  Exercises:    Exercise    RUE ORIF Reps/ Time Weight/ Level Comments    Pulleys 4'/4'  Flex/abd            *Table slides 5x30\" Standing  Flex/Abd    Towel IR S 10x10\"      ER  Sleeper S 10x10\"      Mat       SL ER x15 0#     SL Abd x15 0#     SL flex x15 0#     SL HAB 3x5 0#     Supine Punches 2x10      Supine ABCs x      Wand flex 30\"x5      Wand Abd 30\"x5      Wand ER 30\"x5        Prone scap retrain-retraction/depression 2x20      Standing       *Wall walks fwd/abd 30\"x5              TBand       *Retraction 2x10 orange     *Ext 2x10 orange     *ER/IR 2x10 orange     *Zechariah ER       *HAB           PROM   Flex 168  Abd 168  ER 80  IR 70  AROM seated  Flex 75  Abd 60      Specific Instructions for next treatment: Progress ROM as tolerated    Treatment Charges: Mins Units   []  Modalities     [x]  Ther Exercise 35 2   [x]  Manual Therapy 10 1   []  Ther Activities     []  Aquatics     [x]  Vasocompression 15 1   []  Other     Total Treatment time 60 4       Assessment: [x] Progressing toward goals. Patient needed multiple rest breaks during the pulley abduction exercise. During the wall walks pt. Had to use L hand to move R hand past a certain point to avoid pain that occurs in the R elbow when in a certain position. During the shoulder wand abd. Exercise pt. Could get to half ROM with the wand, then had to use L arm/hand to pull R shoulder into full abd. Pt. Finished today with vasocompression for 15 minutes, med. Pressure, 34 degrees. [] No change. [] Other:     [x] Patient would continue to benefit from skilled physical therapy services in order to: reduce edema and increase RUE ROM/strength. STG: (to be met in 8 treatments)  1. ? Pain: 2/10 or less as she initiates more use of right UE.  2. ? ROM: PROM of right shoulder flexion and abduction to 90.  3. Functional AROM of right wrist flexion, extension, able to supinate to 45 degrees  4. Minimal edema in right hand  5. Patient to be independent with home exercise program as demonstrated by performance with correct form without cues. 6. Demonstrate Knowledge of fall prevention, met 6-1-2020 low fall risk per Zari Lee, no intervention needed    LTG: (to be met in 16 treatments)  1. Functional AROM of right shoulder to raise arms for dressing and grooming. 2. -4/5 strength in right shoulder to allow her to hold up arms and use tools for hair dressing, blow dryer, etc  3. Ability to fully close right hand and  20# or more of strength  4. No edema in right hand  5. Resume sleeping supine in her bed (vs. Recliner)  6. Resume driving  7.  Ability to perform fine motor tasks like buttons, zippers    Pt. Education:  [x] Yes  [] No  [] Reviewed Prior HEP/Ed  Method of Education: [x] Verbal  [] Demo  [] Written  Comprehension of Education:   [x] Verbalizes understanding. [] Demonstrates understanding. [] Needs review. [] Demonstrates/verbalizes HEP/Ed previously given. Plan: [x] Continue current frequency toward long and short term goals.      [x] Specific Instructions for subsequent treatments: continue to focus on ROM and RTC strength       Time In: 0900            Time Out: 1005    Electronically signed by:  Nilsa CARLSON   This documentation was reviewed by Licensed Physical Therapy Assistant Janene Perez PTA

## 2020-10-07 ENCOUNTER — OFFICE VISIT (OUTPATIENT)
Dept: ORTHOPEDIC SURGERY | Age: 50
End: 2020-10-07

## 2020-10-07 VITALS — WEIGHT: 218 LBS | HEIGHT: 67 IN | BODY MASS INDEX: 34.21 KG/M2

## 2020-10-07 PROCEDURE — 99024 POSTOP FOLLOW-UP VISIT: CPT | Performed by: ORTHOPAEDIC SURGERY

## 2020-10-07 NOTE — PROGRESS NOTES
Procedure: Right shoulder arthroscopic capsular release, lysis of adhesions and manipulation under anesthesia  Date of procedure: 9/22/2020    HPI:  Brant Piña is a 44 y.o. female who is approximately 2 weeks status post aforementioned procedure. She states that she was doing well up until the tail end of last week when she had progressively worsening pain through the weekend. She attributes it to doing strengthening exercises and physical therapy as well as may be doing too much at home with her regular activities as well. She states that after bringing this up with a physical therapist at the beginning of the week they backed off on strengthening exercises and she is noticed significant improvement in her pain. She states however that she still remains weak and being unable to actively lift her arm up away from her body without pain. However she is noticed significant improvement in her range of motion and therapy. Physical examination:  Evaluation of patient's right shoulder and upper extremity demonstrates her incisions to be clean, dry, intact. There is no warmth, erythema, wound dehiscence or drainage. Sensation is grossly intact light touch in all dermatomes and she has a 2+ radial pulse with brisk capillary refill in her fingers. Impression and plan:  Brant Piña is a 44 y.o. female who is 2 weeks status post an arthroscopic right shoulder capsular release and manipulation under anesthesia. She is doing relatively well at this time. We reviewed her arthroscopic images. Her sutures were taken out and Steri-Strips and clean dressings applied. She may now get her incisions wet in the shower. We'll have her continue on with physical therapy working on her range of motion. Certainly she can perform isometric rotator cuff and scapular stabilizer strengthening but limited to this focusing mostly on her range of motion.   As her pain subsides we can incorporate progressive strengthening. She can use this arm for activities of daily living within reason and I'll see her back for reevaluation in 4 weeks but she was encouraged to return or call earlier with questions and/or concerns.

## 2020-10-09 ENCOUNTER — HOSPITAL ENCOUNTER (OUTPATIENT)
Dept: PHYSICAL THERAPY | Facility: CLINIC | Age: 50
Setting detail: THERAPIES SERIES
Discharge: HOME | End: 2020-10-09
Payer: COMMERCIAL

## 2020-10-09 PROCEDURE — 97016 VASOPNEUMATIC DEVICE THERAPY: CPT

## 2020-10-09 PROCEDURE — 97140 MANUAL THERAPY 1/> REGIONS: CPT

## 2020-10-09 PROCEDURE — 97110 THERAPEUTIC EXERCISES: CPT

## 2020-10-09 NOTE — FLOWSHEET NOTE
[x] [facility]  Outpatient Rehabilitation &  Therapy  [facility]   [place]: [phone]  F: [phone]      Physical Therapy Daily Treatment Note    Date:  10/9/2020  Patient Name:  Rhiannon Terry    :  1970  MRN: [id number]  Physician: Dr. Renetta Palmerught: Darian Fisher (authorized 18 visits from 2020 to 2020) (6 additional visits approved for total of 24 through 20)  Medical Diagnosis: closed displaced fracture of the greater tuberosity of right humerus, closed fracture distal end of right radius        Date of surgery 2020          Rehab Codes: M79.621, M79.641, M62.511, M62.531, M25.511, MM25.531  Onset Date:  2020          Next 's appt: 20    Visit# / total visits: 27/    Cancels/No Shows: 0/0     Subjective:    Pain:  [] Yes  [x] No Location: R shoulder and R wrist Pain Rating: (0-10 scale) 0/10  Pain altered Tx:  [x] No  [] Yes  Action:  Comments: Patient arrived noting pain has subsided. She saw the surgeon and notes no new issues. Objective:   Exercises:    Exercise    RUE ORIF Reps/ Time Weight/ Level Comments    Pulleys 4'/4'  Flex/abd            *Table slides 5x30\" Standing  Flex/Abd    Towel IR S 10x10\"      ER  Sleeper S 10x10\"      Mat       SL ER x15 0#     SL Abd x15 0#     SL flex x15 0#     SL HAB 3x5 0#     Supine Punches 2x10      Supine ABCs x      Wand flex 30\"x5      Wand Abd 30\"x5      Wand ER 30\"x5        Prone scap retrain-retraction/depression 2x20      Standing       *Wall walks fwd/abd 30\"x5              Shoulder isometrics 10x10\"                                            Specific Instructions for next treatment: Progress ROM as tolerated    Treatment Charges: Mins Units   []  Modalities     [x]  Ther Exercise 35 2   [x]  Manual Therapy 10 1   []  Ther Activities     []  Aquatics     [x]  Vasocompression 15 1   []  Other     Total Treatment time 60 4       Assessment: [x] Progressing toward goals. Progressed isometric program this visit. Patient maintaining ROM with tightness noted at end ranges. [] No change. [] Other:     [x] Patient would continue to benefit from skilled physical therapy services in order to: reduce edema and increase RUE ROM/strength. STG: (to be met in 8 treatments)  1. ? Pain: 2/10 or less as she initiates more use of right UE.  2. ? ROM: PROM of right shoulder flexion and abduction to 90.  3. Functional AROM of right wrist flexion, extension, able to supinate to 45 degrees  4. Minimal edema in right hand  5. Patient to be independent with home exercise program as demonstrated by performance with correct form without cues. 6. Demonstrate Knowledge of fall prevention, met 6-1-2020 low fall risk per Kirstie Silva, no intervention needed    LTG: (to be met in 16 treatments)  1. Functional AROM of right shoulder to raise arms for dressing and grooming. 2. -4/5 strength in right shoulder to allow her to hold up arms and use tools for hair dressing, blow dryer, etc  3. Ability to fully close right hand and  20# or more of strength  4. No edema in right hand  5. Resume sleeping supine in her bed (vs. Recliner)  6. Resume driving  7. Ability to perform fine motor tasks like buttons, zippers    Pt. Education:  [x] Yes  [] No  [] Reviewed Prior HEP/Ed  Method of Education: [x] Verbal  [] Demo  [] Written  Comprehension of Education:   [x] Verbalizes understanding. [] Demonstrates understanding. [] Needs review. [] Demonstrates/verbalizes HEP/Ed previously given. Plan: [x] Continue current frequency toward long and short term goals.      [x] Specific Instructions for subsequent treatments: continue to focus on ROM and RTC strength       Time In: 0900            Time Out: 1005    Electronically signed by:  Sb Pinzon PTA

## 2020-10-12 ENCOUNTER — HOSPITAL ENCOUNTER (OUTPATIENT)
Dept: PHYSICAL THERAPY | Facility: CLINIC | Age: 50
Setting detail: THERAPIES SERIES
Discharge: HOME | End: 2020-10-12
Payer: COMMERCIAL

## 2020-10-12 PROCEDURE — 97016 VASOPNEUMATIC DEVICE THERAPY: CPT

## 2020-10-12 PROCEDURE — 97140 MANUAL THERAPY 1/> REGIONS: CPT

## 2020-10-12 PROCEDURE — 97110 THERAPEUTIC EXERCISES: CPT

## 2020-10-12 NOTE — FLOWSHEET NOTE
[x] [facility]  Outpatient Rehabilitation &  Therapy  [facility]   [place]: [phone]  F: [phone]      Physical Therapy Daily Treatment Note    Date:  10/12/2020  Patient Name:  Asha Dawkins    :  1970  MRN: [id number]  Physician: Dr. Samuel Santizo: Aravind Moraels (authorized 18 visits from 2020 to 2020) (6 additional visits approved for total of 24 through 20)  Medical Diagnosis: closed displaced fracture of the greater tuberosity of right humerus, closed fracture distal end of right radius        Date of surgery 2020          Rehab Codes: M79.621, M79.641, M62.511, M62.531, M25.511, MM25.531  Onset Date:  2020          Next 's appt: 20    Visit# / total visits: 28/    Cancels/No Shows: 0/0     Subjective:    Pain:  [] Yes  [x] No Location: R shoulder and R wrist Pain Rating: (0-10 scale) 0/10  Pain altered Tx:  [x] No  [] Yes  Action:  Comments: Patient arrived today noting no pain, states that R shoulder feels tired. Objective:   Exercises:    Exercise    RUE ORIF Reps/ Time Weight/ Level Comments   Pulleys 4'/4'  Flex/abd          *Table slides 5x30\" Standing  Flex/Abd   Towel IR S 10x10\"     ER  Sleeper S 10x10\"           Mat      SL ER x15 0#    SL Abd x15 0#    SL flex x15 0#    SL HAB 3x5 0#    Supine Punches 2x10     Supine ABCs x     Wand flex 30\"x5     Wand Abd 30\"x5     Wand ER 30\"x5             Standing      *Wall walks fwd/abd 30\"x5      Shoulder isometrics 10x10\"           Prone      Retraction x20     Extention x20     Scaption x20     HAB x20        Specific Instructions for next treatment: Progress ROM as tolerated    Treatment Charges: Mins Units   []  Modalities     [x]  Ther Exercise 35 2   [x]  Manual Therapy 10 1   []  Ther Activities     []  Aquatics     [x]  Vasocompression 15 1   []  Other     Total Treatment time 60 4       Assessment: [x] Progressing toward goals.  Pt. completed all exercises today except side-lying exercises due to R shoulder fatigue, no issues noted with completed exercises. Prone exercises added today with no major issues. During prone scaption, R shoulder fatigue and compensation noted. During the pulley's, abduction, pt needed multiple breaks, no pain noted. Manual ROM completed today. Finished session with  vasocompression 15 min., med. Pressure, 34 degrees. [] No change. [] Other:     [x] Patient would continue to benefit from skilled physical therapy services in order to: reduce edema and increase RUE ROM/strength. STG: (to be met in 8 treatments)  1. ? Pain: 2/10 or less as she initiates more use of right UE.  2. ? ROM: PROM of right shoulder flexion and abduction to 90.  3. Functional AROM of right wrist flexion, extension, able to supinate to 45 degrees  4. Minimal edema in right hand  5. Patient to be independent with home exercise program as demonstrated by performance with correct form without cues. 6. Demonstrate Knowledge of fall prevention, met 6-1-2020 low fall risk per Tessa Cash, no intervention needed    LTG: (to be met in 16 treatments)  1. Functional AROM of right shoulder to raise arms for dressing and grooming. 2. -4/5 strength in right shoulder to allow her to hold up arms and use tools for hair dressing, blow dryer, etc  3. Ability to fully close right hand and  20# or more of strength  4. No edema in right hand  5. Resume sleeping supine in her bed (vs. Recliner)  6. Resume driving  7. Ability to perform fine motor tasks like buttons, zippers    Pt. Education:  [x] Yes  [] No  [] Reviewed Prior HEP/Ed  Method of Education: [x] Verbal  [] Demo  [] Written  Comprehension of Education:   [x] Verbalizes understanding. [] Demonstrates understanding. [] Needs review. [] Demonstrates/verbalizes HEP/Ed previously given. Plan: [x] Continue current frequency toward long and short term goals.      [x] Specific Instructions for subsequent treatments: continue to focus on ROM and RTC strength       Time In: 1705           Time Out: 1810    Electronically signed by:  Rafael Tan PTA

## 2020-10-16 ENCOUNTER — HOSPITAL ENCOUNTER (OUTPATIENT)
Dept: PHYSICAL THERAPY | Facility: CLINIC | Age: 50
Setting detail: THERAPIES SERIES
Discharge: HOME | End: 2020-10-16
Payer: COMMERCIAL

## 2020-10-16 PROCEDURE — 97140 MANUAL THERAPY 1/> REGIONS: CPT

## 2020-10-16 PROCEDURE — 97016 VASOPNEUMATIC DEVICE THERAPY: CPT

## 2020-10-16 PROCEDURE — 97110 THERAPEUTIC EXERCISES: CPT

## 2020-10-16 NOTE — FLOWSHEET NOTE
[x] [facility]  Outpatient Rehabilitation &  Therapy  [facility]   [place]: [phone]  F: [phone]      Physical Therapy Daily Treatment Note    Date:  10/16/2020  Patient Name:  Ale Bowden    :  1970  MRN: [id number]  Physician: Dr. Pepito Mijares: Jacqui Brothers (authorized 18 visits from 2020 to 2020) (6 additional visits approved for total of 24 through 20)  Medical Diagnosis: closed displaced fracture of the greater tuberosity of right humerus, closed fracture distal end of right radius        Date of surgery 2020          Rehab Codes: M79.621, M79.641, M62.511, M62.531, M25.511, MM25.531  Onset Date:  2020          Next 's appt:    Visit# / total visits: 29/    Cancels/No Shows: 0/0     Subjective:    Pain:  [] Yes  [x] No Location: R shoulder and R wrist Pain Rating: (0-10 scale) 0/10  Pain altered Tx:  [x] No  [] Yes  Action:  Comments: Patient arrived today noting continued slow progressions. Objective:   Exercises:    Exercise    RUE ORIF Reps/ Time Weight/ Level Comments   Pulleys 4'/4'  Flex/abd          *Table slides 5x30\" Standing  Flex/Abd   Towel IR S 10x10\"     ER  Sleeper S 10x10\"           Mat      SL ER x15 0#    SL Abd x15 0#    SL flex x15 0#    SL HAB 3x5 0#    Supine Punches 2x10     Supine ABCs x     Wand flex 30\"x5     Wand Abd 30\"x5     Wand ER 30\"x5             Standing      *Wall walks fwd/abd 30\"x5      Shoulder isometrics 10x10\"           Walk out flexion x10 orange          Prone      Retraction x20     Extention x20     Scaption x20     HAB x20        Specific Instructions for next treatment: Progress ROM as tolerated    Treatment Charges: Mins Units   []  Modalities     [x]  Ther Exercise 35 2   [x]  Manual Therapy 10 1   []  Ther Activities     []  Aquatics     [x]  Vasocompression 15 1   []  Other     Total Treatment time 60 4       Assessment: [x] Progressing toward goals.  Continued progressions this date with addition of flexion walk outs. Patients PROM continues to improve with less resistance/guarding. Still lacking strength with abduction and flexion. [] No change. [] Other:     [x] Patient would continue to benefit from skilled physical therapy services in order to: reduce edema and increase RUE ROM/strength. STG: (to be met in 8 treatments)  1. ? Pain: 2/10 or less as she initiates more use of right UE.  2. ? ROM: PROM of right shoulder flexion and abduction to 90.  3. Functional AROM of right wrist flexion, extension, able to supinate to 45 degrees  4. Minimal edema in right hand  5. Patient to be independent with home exercise program as demonstrated by performance with correct form without cues. 6. Demonstrate Knowledge of fall prevention, met 6-1-2020 low fall risk per Tomeka Cruz, no intervention needed    LTG: (to be met in 16 treatments)  1. Functional AROM of right shoulder to raise arms for dressing and grooming. 2. -4/5 strength in right shoulder to allow her to hold up arms and use tools for hair dressing, blow dryer, etc  3. Ability to fully close right hand and  20# or more of strength  4. No edema in right hand  5. Resume sleeping supine in her bed (vs. Recliner)  6. Resume driving  7. Ability to perform fine motor tasks like buttons, zippers    Pt. Education:  [x] Yes  [] No  [] Reviewed Prior HEP/Ed  Method of Education: [x] Verbal  [] Demo  [] Written  Comprehension of Education:   [x] Verbalizes understanding. [] Demonstrates understanding. [] Needs review. [] Demonstrates/verbalizes HEP/Ed previously given. Plan: [x] Continue current frequency toward long and short term goals.      [x] Specific Instructions for subsequent treatments: continue to focus on ROM and RTC strength       Time In: 1350           Time Out: 1500    Electronically signed by:  Wilma Bush PTA

## 2020-10-19 ENCOUNTER — HOSPITAL ENCOUNTER (OUTPATIENT)
Dept: PHYSICAL THERAPY | Facility: CLINIC | Age: 50
Setting detail: THERAPIES SERIES
Discharge: HOME | End: 2020-10-19
Payer: COMMERCIAL

## 2020-10-19 PROCEDURE — 97016 VASOPNEUMATIC DEVICE THERAPY: CPT

## 2020-10-19 PROCEDURE — 97110 THERAPEUTIC EXERCISES: CPT

## 2020-10-19 PROCEDURE — 97140 MANUAL THERAPY 1/> REGIONS: CPT

## 2020-10-19 NOTE — FLOWSHEET NOTE
[x] [facility]  Outpatient Rehabilitation &  Therapy  [facility]   [place]: [phone]  F: [phone]      Physical Therapy Daily Treatment Note    Date:  10/19/2020  Patient Name:  Marybel Siddiqui    :  1970  MRN: [id number]  Physician: Dr. Dominguez Pick: Danielle Chaves (authorized 18 visits from 2020 to 2020) (6 additional visits approved for total of 24 through 20)  Medical Diagnosis: closed displaced fracture of the greater tuberosity of right humerus, closed fracture distal end of right radius        Date of surgery 2020          Rehab Codes: M79.621, M79.641, M62.511, M62.531, M25.511, MM25.531  Onset Date:  2020          Next 's appt:    Visit# / total visits: 30/    Cancels/No Shows: 0/0     Subjective:    Pain:  [] Yes  [x] No Location: R shoulder and R wrist Pain Rating: (0-10 scale) 0/10  Pain altered Tx:  [x] No  [] Yes  Action:  Comments: Patient arrived today noting no pain, but R shoulder feels achy. Reports pain is at its worst at night.      Objective:   Exercises:    Exercise    RUE ORIF Reps/ Time Weight/ Level Comments   Pulleys 4'/4'  Flex/abd          *Table slides 5x30\" Standing  Flex/Abd   Towel IR S 10x10\"     ER  Sleeper S 10x10\"           Mat      SL ER x15 0#    SL Abd x15 0#    SL flex x15 0#    SL HAB 3x5 0#    Supine Punches 2x10     Supine ABCs x     Wand flex 30\"x5     Wand Abd 30\"x5     Wand ER 30\"x5             Standing      *Wall walks fwd/abd 30\"x5      Shoulder isometrics 10x10\"           Walk out flexion x10 green          Prone      Retraction x20     Extention x20     Scaption x20     HAB x20        Specific Instructions for next treatment: Progress ROM as tolerated    Treatment Charges: Mins Units   []  Modalities     [x]  Ther Exercise 35 2   [x]  Manual Therapy 10 1   []  Ther Activities     []  Aquatics     [x]  Vasocompression 15 1   []  Other     Total Treatment time 60 4       Assessment: [x] Progressing toward goals. Continued focus on technique and ROM this date. Patient continues to exhibit difficulty with flexion and abduction. Able to maintain previous ROM with PROM this date with continued tightness/soreness noted. [] No change. [] Other:     [x] Patient would continue to benefit from skilled physical therapy services in order to: reduce edema and increase RUE ROM/strength. STG: (to be met in 8 treatments)  1. ? Pain: 2/10 or less as she initiates more use of right UE.  2. ? ROM: PROM of right shoulder flexion and abduction to 90.  3. Functional AROM of right wrist flexion, extension, able to supinate to 45 degrees  4. Minimal edema in right hand  5. Patient to be independent with home exercise program as demonstrated by performance with correct form without cues. 6. Demonstrate Knowledge of fall prevention, met 6-1-2020 low fall risk per Tex Marr, no intervention needed    LTG: (to be met in 16 treatments)  1. Functional AROM of right shoulder to raise arms for dressing and grooming. 2. -4/5 strength in right shoulder to allow her to hold up arms and use tools for hair dressing, blow dryer, etc  3. Ability to fully close right hand and  20# or more of strength  4. No edema in right hand  5. Resume sleeping supine in her bed (vs. Recliner)  6. Resume driving  7. Ability to perform fine motor tasks like buttons, zippers    Pt. Education:  [x] Yes  [] No  [] Reviewed Prior HEP/Ed  Method of Education: [x] Verbal  [] Demo  [] Written  Comprehension of Education:   [x] Verbalizes understanding. [] Demonstrates understanding. [] Needs review. [] Demonstrates/verbalizes HEP/Ed previously given. Plan: [x] Continue current frequency toward long and short term goals.      [x] Specific Instructions for subsequent treatments: continue to focus on ROM and RTC strength       Time In: 1300           Time Out: 1410    Electronically signed by:  Sylvia Pacheco PTA

## 2020-10-21 ENCOUNTER — APPOINTMENT (OUTPATIENT)
Dept: PHYSICAL THERAPY | Facility: CLINIC | Age: 50
End: 2020-10-21
Payer: COMMERCIAL

## 2020-10-22 ENCOUNTER — HOSPITAL ENCOUNTER (OUTPATIENT)
Dept: PHYSICAL THERAPY | Facility: CLINIC | Age: 50
Discharge: HOME | End: 2020-10-22
Payer: COMMERCIAL

## 2020-10-22 PROCEDURE — 9900000073 HC MANUAL THERAPY PER 15 MIN (SELF-PAY)

## 2020-10-22 PROCEDURE — 9900000067 HC THERAPEUTIC EXERCISE EA 15 MINS (SELF-PAY)

## 2020-10-22 NOTE — FLOWSHEET NOTE
[x] [facility]  Outpatient Rehabilitation &  Therapy  [facility]   [place]: [phone]  F: [phone]      Physical Therapy Daily Treatment Note    Date:  10/22/2020  Patient Name:  Emma Santos    :  1970  MRN: [id number]  Physician: Dr. Valentina Melo: SELF PAY  Medical Diagnosis: closed displaced fracture of the greater tuberosity of right humerus, closed fracture distal end of right radius        Date of surgery 2020          Rehab Codes: M79.621, M79.641, M62.511, M62.531, M25.511, MM25.531  Onset Date:  2020          Next 's appt:    Visit# / total visits: 31/    Cancels/No Shows: 0/0     Subjective:    Pain:  [] Yes  [x] No Location: R shoulder and R wrist Pain Rating: (0-10 scale) 0/10  Pain altered Tx:  [x] No  [] Yes  Action:  Comments: Patient arrived noting continued slow improvements. Objective:   Exercises:    Exercise    RUE ORIF Reps/ Time Weight/ Level Comments   Pulleys 4'/4'  Flex/abd          *Table slides 5x30\" Standing  Flex/Abd   Towel IR S 10x10\"     ER  Sleeper S 10x10\"           Mat      SL ER x15 0#    SL Abd x15 0#    SL flex x15 0#    SL HAB 3x5 0#    Supine Punches 2x10     Supine ABCs x     Wand flex 30\"x5     Wand Abd 30\"x5     Wand ER 30\"x5             Standing      *Wall walks fwd/abd 30\"x5      Shoulder isometrics 10x10\"     ITY x10     Walk out flexion x10 green          Prone      Retraction x20 4#    Extention x20     Scaption x20     HAB x20        Specific Instructions for next treatment: Progress ROM as tolerated    Treatment Charges: Mins Units   []  Modalities     [x]  Ther Exercise 15 1   [x]  Manual Therapy 15 1   []  Ther Activities     []  Aquatics     [x]  Vasocompression     []  Other     Total Treatment time 30 2       Assessment: [x] Progressing toward goals. Continued strength and ROM progressions this date. Patient notes continued pain with ROM but still maintaining previous ROM.      [] No change. [] Other:     [x] Patient would continue to benefit from skilled physical therapy services in order to: reduce edema and increase RUE ROM/strength. STG: (to be met in 8 treatments)  1. ? Pain: 2/10 or less as she initiates more use of right UE.  2. ? ROM: PROM of right shoulder flexion and abduction to 90.  3. Functional AROM of right wrist flexion, extension, able to supinate to 45 degrees  4. Minimal edema in right hand  5. Patient to be independent with home exercise program as demonstrated by performance with correct form without cues. 6. Demonstrate Knowledge of fall prevention, met 6-1-2020 low fall risk per Anay Jiménez, no intervention needed    LTG: (to be met in 16 treatments)  1. Functional AROM of right shoulder to raise arms for dressing and grooming. 2. -4/5 strength in right shoulder to allow her to hold up arms and use tools for hair dressing, blow dryer, etc  3. Ability to fully close right hand and  20# or more of strength  4. No edema in right hand  5. Resume sleeping supine in her bed (vs. Recliner)  6. Resume driving  7. Ability to perform fine motor tasks like buttons, zippers    Pt. Education:  [x] Yes  [] No  [] Reviewed Prior HEP/Ed  Method of Education: [x] Verbal  [] Demo  [] Written  Comprehension of Education:   [x] Verbalizes understanding. [] Demonstrates understanding. [] Needs review. [] Demonstrates/verbalizes HEP/Ed previously given. Plan: [x] Continue current frequency toward long and short term goals.      [x] Specific Instructions for subsequent treatments: continue to focus on ROM and RTC strength       Time In: 1505           Time Out: 1600    Electronically signed by:  Jimmy Buck PTA

## 2020-10-26 ENCOUNTER — HOSPITAL ENCOUNTER (OUTPATIENT)
Dept: PHYSICAL THERAPY | Facility: CLINIC | Age: 50
Discharge: HOME | End: 2020-10-26
Payer: COMMERCIAL

## 2020-10-26 PROCEDURE — 9900000073 HC MANUAL THERAPY PER 15 MIN (SELF-PAY)

## 2020-10-26 PROCEDURE — 9900000067 HC THERAPEUTIC EXERCISE EA 15 MINS (SELF-PAY)

## 2020-11-02 ENCOUNTER — HOSPITAL ENCOUNTER (OUTPATIENT)
Dept: PHYSICAL THERAPY | Facility: CLINIC | Age: 50
Discharge: HOME | End: 2020-11-02

## 2020-11-02 PROCEDURE — 9900000067 HC THERAPEUTIC EXERCISE EA 15 MINS (SELF-PAY)

## 2020-11-02 PROCEDURE — 9900000073 HC MANUAL THERAPY PER 15 MIN (SELF-PAY)

## 2020-11-02 NOTE — FLOWSHEET NOTE
[x] [facility]  Outpatient Rehabilitation &  Therapy  [facility]   [place]: [phone]  F: [phone]      Physical Therapy Daily Treatment Note    Date:  2020  Patient Name:  Emma Santos    :  1970  MRN: [id number]  Physician: Dr. Valentina Melo: SELF PAY  Medical Diagnosis: closed displaced fracture of the greater tuberosity of right humerus, closed fracture distal end of right radius        Date of surgery 2020          Rehab Codes: M79.621, M79.641, M62.511, M62.531, M25.511, MM25.531  Onset Date:  2020            Next 's appt: 20  Visit# / total visits: 33/40              Cancels/No Shows: 0/0     Subjective:    Pain:  [] Yes  [x] No Location: R shoulder and R wrist Pain Rating: (0-10 scale) 2/10  Pain altered Tx:  [x] No  [] Yes  Action:  Comments: Patient arrived noting frustration with progress, patients next doctor appointment is . Objective:   Exercises:    Exercise    RUE ORIF Reps/ Time Weight/ Level Comments   Pulleys 4'/4'  Flex/abd          *Table slides 5x30\" Standing  Flex/Abd   Towel IR S 10x10\"     ER  Sleeper S 10x10\"           Mat      SL ER x15 2#    SL Abd x15 2#    SL flex x15 2#    SL HAB 3x5 2#    Supine Punches 2x10     Supine ABCs x                 Prone      Retraction x20 5#    Extention x20 5#    Scaption x20 2#    HAB x20 2#       Manual:  RUE GH glides all planes, RUE pec minor, UT MFR/DI    Specific Instructions for next treatment: Progress ROM as tolerated    Treatment Charges: Mins Units   []  Modalities     [x]  Ther Exercise 18 1   [x]  Manual Therapy 15 1   []  Ther Activities     []  Aquatics     [x]  Vasocompression     []  Other     Total Treatment time 33 2       Assessment: [x] Progressing toward goals. Increased tightness in R shoulder this date, able to obtain prior ROM post manual. Continued fatigue noted with exercises. [] No change.      [] Other:     [x] Patient would continue to benefit from skilled physical therapy services in order to: increase strength and maintain ROM. STG: (to be met in 8 treatments)  1. ? Pain: 2/10 or less as she initiates more use of right UE.  2. ? ROM: PROM of right shoulder flexion and abduction to 90.  3. Functional AROM of right wrist flexion, extension, able to supinate to 45 degrees  4. Minimal edema in right hand  5. Patient to be independent with home exercise program as demonstrated by performance with correct form without cues. 6. Demonstrate Knowledge of fall prevention, met 6-1-2020 low fall risk per Tex Marr, no intervention needed    LTG: (to be met in 16 treatments)  1. Functional AROM of right shoulder to raise arms for dressing and grooming. 2. -4/5 strength in right shoulder to allow her to hold up arms and use tools for hair dressing, blow dryer, etc  3. Ability to fully close right hand and  20# or more of strength  4. No edema in right hand  5. Resume sleeping supine in her bed (vs. Recliner)  6. Resume driving  7. Ability to perform fine motor tasks like buttons, zippers    Pt. Education:  [x] Yes  [] No  [x] Reviewed Prior HEP/Ed  Method of Education: [x] Verbal  [x] Demo  [] Written  Comprehension of Education:   [x] Verbalizes understanding. [x] Demonstrates understanding. [] Needs review. [] Demonstrates/verbalizes HEP/Ed previously given. Plan: [x] Continue current frequency toward long and short term goals.      [x] Specific Instructions for subsequent treatments: continue to focus on ROM and RTC strength       Time In: 1500            Time Out: 1600    Electronically signed by:  Sylvia Pacheco PTA

## 2020-11-04 ENCOUNTER — OFFICE VISIT (OUTPATIENT)
Dept: ORTHOPEDIC SURGERY | Age: 50
End: 2020-11-04

## 2020-11-04 VITALS — BODY MASS INDEX: 34.06 KG/M2 | HEIGHT: 67 IN | WEIGHT: 217 LBS

## 2020-11-04 PROCEDURE — 99024 POSTOP FOLLOW-UP VISIT: CPT | Performed by: ORTHOPAEDIC SURGERY

## 2020-11-06 NOTE — PROGRESS NOTES
Procedure: Right shoulder arthroscopic capsular release, lysis of adhesions and manipulation under anesthesia  Date of procedure: 9/22/2020    HPI:  Marce Davis is a 44 y.o. female who is approximately 6 weeks status post aforementioned procedure. Unfortunately she does not appear to be doing much better at this point indicating that she continues to have pain with movement of the shoulder and cannot actively elevate or AB duct it away from her body significantly. Physical examination:  Evaluation of patient's right shoulder and upper extremity demonstrates her incisions to be healed. There is no warmth, erythema, wound dehiscence or drainage. Sensation is grossly intact light touch in all dermatomes and she has a 2+ radial pulse with brisk capillary refill in her fingers. Actively she has 55 degrees of shoulder elevation, abduction, and external rotation. Passively she has 140 degrees of shoulder elevation, 140 degrees of abduction, and 55 degrees of external rotation. On the contralateral side she has 155 degrees of active shoulder elevation, 155 degrees of abduction and 75 degrees of external rotation. Impression and plan:  Marce Davis is a 44 y.o. female who is 6 weeks status post an arthroscopic right shoulder capsular release and manipulation under anesthesia. Unfortunately she is not doing very well at this time. Her motion has improved passively but she has no real active function. I had a discussion with the patient today with regards to this. It does not appear to be a nerve issue at this point and so my concern is for the presence of a significant rotator cuff tear. Consequently today I did recommend proceeding with an MR arthrogram of her shoulder. I will facilitate her getting the study completed and I would like her to return afterwards to review and discuss results proceeding with treatment as indicated.   Of note she can hold off on additional physical therapy especially given the fact that she has pain out-of-pocket for this at this time.

## 2020-11-13 ENCOUNTER — HOSPITAL ENCOUNTER (OUTPATIENT)
Dept: INTERVENTIONAL RADIOLOGY/VASCULAR | Age: 50
Discharge: HOME | End: 2020-11-15
Payer: COMMERCIAL

## 2020-11-13 ENCOUNTER — HOSPITAL ENCOUNTER (OUTPATIENT)
Dept: MRI IMAGING | Age: 50
Discharge: HOME | End: 2020-11-15
Payer: COMMERCIAL

## 2020-11-13 VITALS
SYSTOLIC BLOOD PRESSURE: 132 MMHG | RESPIRATION RATE: 16 BRPM | DIASTOLIC BLOOD PRESSURE: 74 MMHG | OXYGEN SATURATION: 100 % | HEART RATE: 84 BPM

## 2020-11-13 PROCEDURE — 73040 CONTRAST X-RAY OF SHOULDER: CPT | Performed by: RADIOLOGY

## 2020-11-13 PROCEDURE — 2709999900 IR ARTHR/ASP/INJ MAJOR JT/BURSA RIGHT WO US

## 2020-11-13 PROCEDURE — 73222 MRI JOINT UPR EXTREM W/DYE: CPT

## 2020-11-13 PROCEDURE — A9579 GAD-BASE MR CONTRAST NOS,1ML: HCPCS | Performed by: RADIOLOGY

## 2020-11-13 PROCEDURE — 6360000004 HC RX CONTRAST MEDICATION: Performed by: RADIOLOGY

## 2020-11-13 PROCEDURE — 23350 INJECTION FOR SHOULDER X-RAY: CPT | Performed by: RADIOLOGY

## 2020-11-13 RX ORDER — ACETAMINOPHEN 325 MG/1
650 TABLET ORAL EVERY 4 HOURS PRN
Status: DISCONTINUED | OUTPATIENT
Start: 2020-11-13 | End: 2020-11-16 | Stop reason: HOSPADM

## 2020-11-13 RX ADMIN — IOPAMIDOL 12 ML: 612 INJECTION, SOLUTION INTRAVENOUS at 09:37

## 2020-11-13 RX ADMIN — GADOTERIDOL 0.2 ML: 279.3 INJECTION, SOLUTION INTRAVENOUS at 09:37

## 2020-11-13 NOTE — BRIEF OP NOTE
Brief Postoperative Note    Dario Rowley  YOB: 1970  [id number]    Pre-operative Diagnosis: Chronic rt shoulder pain    Post-operative Diagnosis: Same    Procedure: Rt shoulder inj pre MRI    Medications Given: none    Anesthesia: Local    Surgeons/Assistants: Ronnie León MD    Estimated Blood Loss: minimal    Complications: none    Specimens: were not obtained    Findings: 8 mls contrast and dilute ELIGIO injected into rt shoulder joint under fluoro without incident. Pt tolerated well. Full report to follow.       Electronically signed by Ronnie León on 11/13/2020 at 9:55 AM

## 2020-11-13 NOTE — PROGRESS NOTES
Pt tolerated procedure without distress. Dressing applied to procedure site. Discharge instructions reviewed understanding verbalized, pt released ambulatory in nad.   Taken to [facility] for further images

## 2020-11-15 RX ORDER — LEVOTHYROXINE SODIUM 0.15 MG/1
TABLET ORAL
Qty: 30 TABLET | Refills: 4 | Status: SHIPPED | OUTPATIENT
Start: 2020-11-15 | End: 2021-04-18

## 2020-11-18 ENCOUNTER — OFFICE VISIT (OUTPATIENT)
Dept: ORTHOPEDIC SURGERY | Age: 50
End: 2020-11-18

## 2020-11-18 PROCEDURE — 99024 POSTOP FOLLOW-UP VISIT: CPT | Performed by: ORTHOPAEDIC SURGERY

## 2020-11-21 NOTE — PROGRESS NOTES
HPI: Mrs. Zeus Clay is here today to review the results of her right shoulder MRI study. This was completed on 11/13/2020. Soon after her shoulder manipulation she had progressively worsening pain and decreased function. She has not improved much since then. I did as a result get the aforementioned MRI study due to the fact that her findings were worrisome for the presence of a full-thickness rotator cuff tear. I reviewed her MRI images with her today and it does demonstrate a massive full-thickness tear involving the supraspinatus and infraspinatus tendons. I had a discussion with the patient and her  today regarding this educating them about the natural history of a full-thickness rotator cuff tear. 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 Given the nature of the tear as well as her age and activity level in addition to the history around this tear I would recommend surgical intervention by way of arthroscopic rotator cuff repair. I did have a discussion with the patient today with regards to the fact that in the event that this is not repairable I would recommend proceeding with a superior capsular reconstruction. We will plan on performing this in the same setting as indicated. Should I be able to fully repair the rotator cuff we discussed the possibility of augmenting this with bone marrow aspirate as well as the graft augmentation. We discussed in detail what the surgery would entail in terms of the procedure, risks and benefits of surgery, expected outcome and postoperative recovery course.  Risks as discussed included but were not limited to risk of infection, wound healing problems, stiffness, progressive arthritis, persistent pain, re-tear of the rotator cuff, failure of the rotator cuff repair to heal, suture and/or anchor related problems, vascular injury, excessive bleeding, temporary and/or permanent nerve injury, medical risks including DVT, PE, and stroke, and risk of anesthesia. The concern with her shoulder is the size of her rotator cuff tear the fact that this is somewhat of a revision in the sense that she did have an associated rotator cuff tear with her greater tuberosity fracture. Both of these were fixed. She had questions regarding when she may be able to go back to work fully. I explained to her that she will be under some level of restriction up to 6 months. She may be able to get back to here dressing somewhere between 3 to 6 months. She does do some nail work as well. She may be able to get back to this after 6 weeks. She demonstrated a good understanding of our discussion and at this time would like to proceed with surgical intervention as outlined above. We will facilitate her getting cleared for surgery preoperatively and we'll schedule her for surgery at her convenience. All questions were appropriately answered however she was encouraged to return or call prior to surgery or any other upcoming appointments with additional questions and/or concerns. The patient was counseled at length about the risks of mir Covid-19 during their perioperative period and any recovery window from their procedure. The patient was made aware that mir Covid-19  may worsen their prognosis for recovering from their procedure  and lend to a higher morbidity and/or mortality risk. All material risks, benefits, and reasonable alternatives including postponing the procedure were discussed. The patient does wish to proceed with the procedure at this time.

## 2020-11-25 ENCOUNTER — TELEMEDICINE (OUTPATIENT)
Dept: FAMILY MEDICINE CLINIC | Age: 50
End: 2020-11-25
Payer: COMMERCIAL

## 2020-11-25 PROCEDURE — 99214 OFFICE O/P EST MOD 30 MIN: CPT | Performed by: FAMILY MEDICINE

## 2020-11-25 ASSESSMENT — ENCOUNTER SYMPTOMS
ALLERGIC/IMMUNOLOGIC NEGATIVE: 1
ABDOMINAL PAIN: 0
COUGH: 0
EYES NEGATIVE: 1
SHORTNESS OF BREATH: 0

## 2020-11-25 NOTE — LETTER
[facility], [address] [place] [address], [address] [place] [address]  [phone]    Dr. Valerie Hickey MD      2020      RE: Ari QUARLES 1970    Dear Dr. Kandy Wallace was evaluated in my office for pre-operative evaluation. Based on review of information available to me at this time, the patient appears to be at no increased risk for the planned procedure. Current Outpatient Medications on File Prior to Visit   Medication Sig Dispense Refill    levothyroxine (SYNTHROID) 150 MCG tablet TAKE ONE TABLET BY MOUTH DAILY 30 tablet 4    PRILOSEC OTC 20 MG tablet TAKE ONE TABLET BY MOUTH DAILY 30 tablet 3     No current facility-administered medications on file prior to visit. Past Surgical History:   Procedure Laterality Date    CHOLECYSTECTOMY  2019    HUMERUS FRACTURE SURGERY Right 2020    HUMERUS OPEN REDUCTION INTERNAL FIXATION performed by Liberty Mckinnon MD at [facility] [place]      x3    SHOULDER ARTHROSCOPY Right 2020    SHOULDER ARTHROSCOPY - CAPSULAR RELEASE WITH MANIPULATION     SHOULDER ARTHROSCOPY Right 2020    SHOULDER ARTHROSCOPY - CAPSULAR RELEASE WITH MANIPULATION UNDER ANESTHESIA AND PRE OP INTERSCALENE BLOC performed by Liberty Mckinnon MD at [facility] [place] [facility] Right 2020    WRIST OPEN REDUCTION INTERNAL FIXATION performed by Liberty Mckinnon MD at [address]         If you have any questions, please feel free to contact me.     Sincerely,        Dr. Valerie Hickey MD

## 2020-11-25 NOTE — PROGRESS NOTES
[facility]  [facility]  [address]  [facility] [address]  Dept: [phone]    2020    TELEHEALTH EVALUATION -- Audio/Visual (During BCNWZ-08 public health emergency)  uV Monroe (:  1970) has requested an audio/video evaluation for the following concern(s):    HPI:  Video visit to discuss pre-op clearance for planned right rotator cuff surgery with Dr. Kari Espino. She fell in April and fx her wrist and shoulder. Had repair at that time and developed a frozen shoulder which required arthroscopy. She went to PT and thinks it was aggressive and may have torn the repair. 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 Surgery planned for December 15 at [address] in [facility]. Has had general anesthesia with no complications other than nausea, uses a scopalamine patch. bp has been normal at all doctor appointments. Taking thyroid and omeprazole. She had been prescribed effexor for menopause sx but she then had a menses in September after 6 months and hot flashes have improved with colder weather. She thinks she may have had covid starting 12 days ago. Had  Sinus congestion and then had loss of sense of taste and smell. She did not get tested. Did not have a fever. Will be tested for covid . Did not work during her sx for 10 days. No known exposure. Denies chest pain, sob, cough, fever. Hx of hypothyroidism, has lab order to get rechecked in December. There were no vitals filed for this visit. REVIEW OF SYSTEMS:   Review of Systems   Constitutional: Negative for fatigue, fever and unexpected weight change. HENT: Negative. Loss of sense of smell and taste for the last 12 days. Eyes: Negative. Respiratory: Negative for cough and shortness of breath. Cardiovascular: Negative for chest pain and leg swelling. Gastrointestinal: Negative for abdominal pain. Endocrine: Negative. Musculoskeletal: Positive for arthralgias (  Right shoulder pain and limited range of motion). Skin: Negative. Allergic/Immunologic: Negative. Hematological: Negative. Psychiatric/Behavioral: Negative for sleep disturbance. The patient is not nervous/anxious.         PAST MEDICAL HISTORY:    Past Medical History:   Diagnosis Date    Abnormal Pap smear of cervix     GERD (gastroesophageal reflux disease)     Hypothyroidism     PONV (postoperative nausea and vomiting)        FAMILY HISTORY:    Family History   Problem Relation Age of Onset    Cancer Mother         unknown    COPD Father     Kidney Disease Father     Colon Polyps Brother        SOCIAL HISTORY:    Social History     Socioeconomic History    Marital status:      Spouse name: Not on file    Number of children: 2    Years of education: Not on file    Highest education level: Not on file   Occupational History    Occupation:    Social Needs    Financial resource strain: Not on file    Food insecurity     Worry: Not on file     Inability: Not on file    Transportation needs     Medical: Not on file     Non-medical: Not on file   Tobacco Use    Smoking status: Former Smoker     Last attempt to quit: 2/24/2010     Years since quitting: 10.7    Smokeless tobacco: Never Used   Substance and Sexual Activity    Alcohol use: Yes     Comment: rarely    Drug use: No    Sexual activity: Yes     Partners: Male   Lifestyle    Physical activity     Days per week: Not on file     Minutes per session: Not on file    Stress: Not on file   Relationships    Social connections     Talks on phone: Not on file     Gets together: Not on file     Attends Yazdanism service: Not on file     Active member of club or organization: Not on file     Attends meetings of clubs or organizations: Not on file     Relationship status: Not on file    Intimate partner violence     Fear of current or ex partner: Not on file     Emotionally abused: Not on file     Physically abused: Not on file     Forced sexual activity: Not on file   Other Topics Concern    Not on file   Social History Narrative    Not on file       SURGICAL HISTORY:    Past Surgical History:   Procedure Laterality Date    CHOLECYSTECTOMY  05/2019    HUMERUS FRACTURE SURGERY Right 5/5/2020    HUMERUS OPEN REDUCTION INTERNAL FIXATION performed by Saeed Connolly MD at [address] [place]      x3    SHOULDER ARTHROSCOPY Right 09/22/2020    SHOULDER ARTHROSCOPY - CAPSULAR RELEASE WITH MANIPULATION     SHOULDER ARTHROSCOPY Right 9/22/2020    SHOULDER ARTHROSCOPY - CAPSULAR RELEASE WITH MANIPULATION UNDER ANESTHESIA AND PRE OP INTERSCALENE BLOC performed by Saeed Connolly MD at [address] Right 5/5/2020    WRIST OPEN REDUCTION INTERNAL FIXATION performed by Saeed Connolly MD at [address] [place] [address] Avenue:    Current Outpatient Medications   Medication Sig Dispense Refill    levothyroxine (SYNTHROID) 150 MCG tablet TAKE ONE TABLET BY MOUTH DAILY 30 tablet 4    PRILOSEC OTC 20 MG tablet TAKE ONE TABLET BY MOUTH DAILY 30 tablet 3     No current facility-administered medications for this visit. ALLERGIES:   No Known Allergies    PHYSICAL EXAM:   Physical Exam  Vitals signs reviewed. Constitutional:       Appearance: Normal appearance. HENT:      Head: Normocephalic. Eyes:      Extraocular Movements: Extraocular movements intact. Conjunctiva/sclera: Conjunctivae normal.   Neck:      Musculoskeletal: Normal range of motion. Pulmonary:      Effort: Pulmonary effort is normal.   Musculoskeletal: Normal range of motion. Skin:     Findings: No rash. Neurological:      General: No focal deficit present. Mental Status: She is alert and oriented to person, place, and time. Mental status is at baseline.    Psychiatric:         Mood and Affect: Mood normal.         Behavior: Behavior normal.         Thought Content: Thought content normal.         Judgment: Judgment normal.          ASSESSMENT/PLAN:  1. Nontraumatic tear of right rotator cuff, unspecified tear extent  Medical clearance will be provided for planned procedure. She will have preop labs and Covid testing. Prior labs and vital signs have been normal.  She has had recent surgeries with no complications. 2. Pre-operative clearance  Okay to proceed with planned rotator cuff repair. 3. Hypothyroidism due to Hashimoto's thyroiditis  Recheck labs with preop labs. Continue medications. Return in about 6 months (around 5/25/2021) for thyroid. Tamiko Gupta is a 44 y.o. female being evaluated by a Virtual Visit (video visit) encounter to address concerns as mentioned above. A caregiver was present when appropriate. Due to this being a TeleHealth encounter (During [facility] public [facility] emergency), evaluation of the following organ systems was limited: Vitals/Constitutional/EENT/Resp/CV/GI//MS/Neuro/Skin/Heme-Lymph-Imm. Pursuant to the emergency declaration under the [address] [place] [address] authority and the [employer] Act, this Virtual Visit was conducted with patient's (and/or legal guardian's) consent, to reduce the patient's risk of exposure to COVID-19 and provide necessary medical care. The patient (and/or legal guardian) has also been advised to contact this office for worsening conditions or problems, and seek emergency medical treatment and/or call 911 if deemed necessary. Services were provided through a video synchronous discussion virtually to substitute for in-person clinic visit. Patient and provider were located at their individual homes. --Rodriguez Russo MD on 11/25/2020 at 8:45 AM    An electronic signature was used to authenticate this note.

## 2020-11-30 NOTE — PROGRESS NOTES
DAY OF SURGERY/PROCEDURE  GUIDELINES    As a patient at the [facility] you can expect quality medical and nursing care that is centered on your individual needs. It is our goal to make your surgical experience as comfortable and excellent as possible.  ________________________________________________________________________    The following instructions are general guidelines, if any information on this sheet is different from what your doctor has instructed you to do, please follow your doctor's instructions. · Please arrive on time or your procedure may be rescheduled. · Enter through front entrance C of the building. · Upon arrival you will be taken to the pre-operative area to get ready for surgery, your family will stay in the waiting room and visit with you once you are ready for surgery. Due to special limitations please limit visitation to 1 member of your family. When it is time for surgery your family will return to the waiting room. · You will be given a specific time when you will NOT be able to eat, drink, smoke, suck or chew ANYTHING (no water, gum, mints, cigarettes, cigars, pipes, snuff, chewing tobacco, etc.) or your surgery may be canceled. This will occur during your PAT appointment or by phone 1-2 days before surgery  · Take a shower or bath on the morning of your surgery/procedure (Hibiclens if directed)  · Brush your teeth, but do not swallow any water  · IN CASE OF ILLNESS - If you have a cold or flu symptoms (high fever, runny nose, sore throat, cough, etc.) rash, nausea, vomiting, loose stools, and/or recent contact with someone who has a contagious disease (chick pox, measles, etc.) please call your doctor before coming to the surgery center  · Take a small sip of water with heart, blood pressure, and/or seizure medication the morning of surgery.  (DO NOT take blood pressure medications that contain a diuretic)  · If applicable bring your:  · Inhaler (s)  · Hearing aid(s)  · Eyeglasses and Case (If you wear contacts they have to be removed before surgery, bring case and solution)  · Any paperwork given to you by your doctor  · Any X-rays you were told to bring  · A copy of your Living Will or Durable Power of     · DO NOT take anticoagulants (blood thinners, aspiring or aspirin-containing products) two weeks prior to your surgery. · DO NOT take any diabetic pills or insulin. Please bring your sliding scale instructions and sick day plan with you. If you have a low blood sugar reaction after midnight, drink 4 ounces of apple juice or regular pop. · Wear loose, comfortable clothing that is easy to put on and take off. · You will be returning home the same day as your surgery, you will need to have a responsible adult (25years of age or older) present to drive you home. You will need someone to stay with you at home for the first 24 hours following your surgery. This is due to the anesthesia and the medications given to you during surgery and recovery. · Your doctor may talk with your family immediately following your procedure. Depending on your needs, you will stay in the recovery room between 30 minutes to 2 hours. · While you are recovering, the surgery family waiting room  can answer many of your family's questions. All medically related questions will be answered by a medical professional. If you had outpatient surgery, you will meet your family for discharge instructions before leaving.

## 2020-12-01 ENCOUNTER — HOSPITAL ENCOUNTER (OUTPATIENT)
Dept: PREADMISSION TESTING | Age: 50
Discharge: HOME | End: 2020-12-01

## 2020-12-07 ENCOUNTER — HOSPITAL ENCOUNTER (OUTPATIENT)
Age: 50
Setting detail: SPECIMEN
Discharge: HOME | End: 2020-12-07
Payer: COMMERCIAL

## 2020-12-07 ENCOUNTER — ANESTHESIA EVENT (OUTPATIENT)
Dept: OPERATING ROOM | Age: 50
End: 2020-12-07
Payer: COMMERCIAL

## 2020-12-07 LAB
ANION GAP SERPL CALCULATED.3IONS-SCNC: 10 MMOL/L (ref 9–17)
BUN BLDV-MCNC: 11 MG/DL (ref 6–20)
BUN/CREAT BLD: NORMAL (ref 9–20)
CALCIUM SERPL-MCNC: 8.7 MG/DL (ref 8.6–10.4)
CHLORIDE BLD-SCNC: 105 MMOL/L (ref 98–107)
CO2: 23 MMOL/L (ref 20–31)
CREAT SERPL-MCNC: 0.76 MG/DL (ref 0.5–0.9)
GFR AFRICAN AMERICAN: >60 ML/MIN
GFR NON-AFRICAN AMERICAN: >60 ML/MIN
GFR SERPL CREATININE-BSD FRML MDRD: NORMAL ML/MIN/{1.73_M2}
GFR SERPL CREATININE-BSD FRML MDRD: NORMAL ML/MIN/{1.73_M2}
GLUCOSE BLD-MCNC: 89 MG/DL (ref 70–99)
HCT VFR BLD CALC: 40.2 % (ref 36.3–47.1)
HEMOGLOBIN: 12.7 G/DL (ref 11.9–15.1)
MCH RBC QN AUTO: 30.5 PG (ref 25.2–33.5)
MCHC RBC AUTO-ENTMCNC: 31.6 G/DL (ref 28.4–34.8)
MCV RBC AUTO: 96.4 FL (ref 82.6–102.9)
NRBC AUTOMATED: 0 PER 100 WBC
PDW BLD-RTO: 11.7 % (ref 11.8–14.4)
PLATELET # BLD: 354 K/UL (ref 138–453)
PMV BLD AUTO: 10.2 FL (ref 8.1–13.5)
POTASSIUM SERPL-SCNC: 4.3 MMOL/L (ref 3.7–5.3)
RBC # BLD: 4.17 M/UL (ref 3.95–5.11)
SODIUM BLD-SCNC: 138 MMOL/L (ref 135–144)
THYROXINE, FREE: 1.33 NG/DL (ref 0.93–1.7)
TSH SERPL DL<=0.05 MIU/L-ACNC: 2.76 MIU/L (ref 0.3–5)
WBC # BLD: 6.3 K/UL (ref 3.5–11.3)

## 2020-12-11 ENCOUNTER — OFFICE VISIT (OUTPATIENT)
Dept: ORTHOPEDIC SURGERY | Age: 50
End: 2020-12-11
Payer: COMMERCIAL

## 2020-12-11 ENCOUNTER — HOSPITAL ENCOUNTER (OUTPATIENT)
Dept: LAB | Age: 50
Setting detail: SPECIMEN
Discharge: HOME | End: 2020-12-11
Payer: COMMERCIAL

## 2020-12-11 VITALS — TEMPERATURE: 97.3 F | HEIGHT: 67 IN | BODY MASS INDEX: 34.06 KG/M2 | WEIGHT: 217 LBS

## 2020-12-11 PROCEDURE — U0003 INFECTIOUS AGENT DETECTION BY NUCLEIC ACID (DNA OR RNA); SEVERE ACUTE RESPIRATORY SYNDROME CORONAVIRUS 2 (SARS-COV-2) (CORONAVIRUS DISEASE [COVID-19]), AMPLIFIED PROBE TECHNIQUE, MAKING USE OF HIGH THROUGHPUT TECHNOLOGIES AS DESCRIBED BY CMS-2020-01-R: HCPCS

## 2020-12-11 PROCEDURE — 99212 OFFICE O/P EST SF 10 MIN: CPT | Performed by: ORTHOPAEDIC SURGERY

## 2020-12-11 RX ORDER — HYDROCODONE BITARTRATE AND ACETAMINOPHEN 5; 325 MG/1; MG/1
1 TABLET ORAL EVERY 4 HOURS PRN
Qty: 42 TABLET | Refills: 0 | Status: SHIPPED | OUTPATIENT
Start: 2020-12-11 | End: 2020-12-18

## 2020-12-11 RX ORDER — ONDANSETRON 4 MG/1
4 TABLET, FILM COATED ORAL DAILY PRN
Qty: 20 TABLET | Refills: 0 | Status: SHIPPED | OUTPATIENT
Start: 2020-12-11 | End: 2022-05-02 | Stop reason: ALTCHOICE

## 2020-12-11 RX ORDER — GABAPENTIN 300 MG/1
300 CAPSULE ORAL DAILY
Qty: 7 CAPSULE | Refills: 0 | Status: ON HOLD | OUTPATIENT
Start: 2020-12-11 | End: 2021-01-05

## 2020-12-12 LAB
SARS-COV-2, RAPID: ABNORMAL
SARS-COV-2: ABNORMAL
SARS-COV-2: DETECTED
SOURCE: ABNORMAL

## 2020-12-12 NOTE — PROGRESS NOTES
HPI: Ms. Janie Hayes presents today for her preoperative visit regarding her right shoulder. She has had a relatively protracted course of issues with the shoulder starting with a displaced greater tuberosity fracture for which she underwent an ORIF back on 5/5/2020. She subsequently developed a frozen shoulder and had a capsular release and manipulation done on 9/22/2020. Following that procedure she developed pseudoparalysis and after failure to respond to therapy a repeat MRI was obtained on 11/13/2020 demonstrated a massive rotator cuff tear involving supraspinatus and infraspinatus tendons where an earlier MRI on 9/4/2020 failed to demonstrate any tear  and intraoperatively during her arthroscopic capsular release was also noted to be intact. In any event at this time she continues to have significant shoulder pain and dysfunction and so is elected to proceed with surgical intervention by way of an arthroscopic rotator cuff repair with possible allograft augmentation and a possible superior capsular reconstruction. We once again discussed in detail what the surgery is going to entail in terms of the procedure, expected outcome and postoperative recovery course. She was provided with her prescriptions for postoperative analgesics and was placed in a sling to protect her shoulder leading up to and following surgery. She has undergone appropriate preoperative medical clearance. All questions were appropriately answered. We will proceed with surgery as currently scheduled.

## 2020-12-13 ENCOUNTER — TELEPHONE (OUTPATIENT)
Dept: PRIMARY CARE CLINIC | Age: 50
End: 2020-12-13

## 2020-12-15 ENCOUNTER — ANESTHESIA (OUTPATIENT)
Dept: OPERATING ROOM | Age: 50
End: 2020-12-15
Payer: COMMERCIAL

## 2021-01-04 RX ORDER — SODIUM CHLORIDE 0.9 % (FLUSH) 0.9 %
10 SYRINGE (ML) INJECTION PRN
Status: CANCELLED | OUTPATIENT
Start: 2021-01-04

## 2021-01-04 RX ORDER — ACETAMINOPHEN 325 MG/1
1000 TABLET ORAL ONCE
Status: CANCELLED | OUTPATIENT
Start: 2021-01-04 | End: 2021-01-04

## 2021-01-04 RX ORDER — SODIUM CHLORIDE 0.9 % (FLUSH) 0.9 %
10 SYRINGE (ML) INJECTION EVERY 12 HOURS SCHEDULED
Status: CANCELLED | OUTPATIENT
Start: 2021-01-04

## 2021-01-05 ENCOUNTER — HOSPITAL ENCOUNTER (OUTPATIENT)
Age: 51
Setting detail: OUTPATIENT SURGERY
Discharge: HOME | End: 2021-01-05
Attending: ORTHOPAEDIC SURGERY | Admitting: ORTHOPAEDIC SURGERY
Payer: COMMERCIAL

## 2021-01-05 VITALS
HEART RATE: 87 BPM | HEIGHT: 67 IN | BODY MASS INDEX: 35.06 KG/M2 | WEIGHT: 223.4 LBS | DIASTOLIC BLOOD PRESSURE: 87 MMHG | TEMPERATURE: 97.1 F | SYSTOLIC BLOOD PRESSURE: 126 MMHG | OXYGEN SATURATION: 95 % | RESPIRATION RATE: 16 BRPM

## 2021-01-05 VITALS
OXYGEN SATURATION: 100 % | TEMPERATURE: 96.3 F | SYSTOLIC BLOOD PRESSURE: 101 MMHG | RESPIRATION RATE: 16 BRPM | DIASTOLIC BLOOD PRESSURE: 55 MMHG

## 2021-01-05 PROBLEM — M75.121 COMPLETE ROTATOR CUFF TEAR OR RUPTURE OF RIGHT SHOULDER, NOT SPECIFIED AS TRAUMATIC: Status: ACTIVE | Noted: 2021-01-05

## 2021-01-05 LAB — HCG, PREGNANCY URINE (POC): NEGATIVE

## 2021-01-05 PROCEDURE — 3600000004 HC SURGERY LEVEL 4 BASE: Performed by: ORTHOPAEDIC SURGERY

## 2021-01-05 PROCEDURE — C1713 ANCHOR/SCREW BN/BN,TIS/BN: HCPCS | Performed by: ORTHOPAEDIC SURGERY

## 2021-01-05 PROCEDURE — 2709999900 HC NON-CHARGEABLE SUPPLY: Performed by: ORTHOPAEDIC SURGERY

## 2021-01-05 PROCEDURE — 2580000003 HC RX 258: Performed by: ORTHOPAEDIC SURGERY

## 2021-01-05 PROCEDURE — 64415 NJX AA&/STRD BRCH PLXS IMG: CPT | Performed by: ANESTHESIOLOGY

## 2021-01-05 PROCEDURE — 3700000000 HC ANESTHESIA ATTENDED CARE: Performed by: ORTHOPAEDIC SURGERY

## 2021-01-05 PROCEDURE — 29827 SHO ARTHRS SRG RT8TR CUF RPR: CPT | Performed by: ORTHOPAEDIC SURGERY

## 2021-01-05 PROCEDURE — 6360000002 HC RX W HCPCS: Performed by: ORTHOPAEDIC SURGERY

## 2021-01-05 PROCEDURE — 6360000002 HC RX W HCPCS: Performed by: NURSE ANESTHETIST, CERTIFIED REGISTERED

## 2021-01-05 PROCEDURE — 6370000000 HC RX 637 (ALT 250 FOR IP)

## 2021-01-05 PROCEDURE — 3600000014 HC SURGERY LEVEL 4 ADDTL 15MIN: Performed by: ORTHOPAEDIC SURGERY

## 2021-01-05 PROCEDURE — 6360000002 HC RX W HCPCS: Performed by: ANESTHESIOLOGY

## 2021-01-05 PROCEDURE — 2500000003 HC RX 250 WO HCPCS: Performed by: ORTHOPAEDIC SURGERY

## 2021-01-05 PROCEDURE — 2500000003 HC RX 250 WO HCPCS: Performed by: ANESTHESIOLOGY

## 2021-01-05 PROCEDURE — 2500000003 HC RX 250 WO HCPCS: Performed by: NURSE ANESTHETIST, CERTIFIED REGISTERED

## 2021-01-05 PROCEDURE — 7100000000 HC PACU RECOVERY - FIRST 15 MIN: Performed by: ORTHOPAEDIC SURGERY

## 2021-01-05 PROCEDURE — 29828 SHO ARTHRS SRG BICP TENODSIS: CPT | Performed by: ORTHOPAEDIC SURGERY

## 2021-01-05 PROCEDURE — 2580000003 HC RX 258: Performed by: NURSE ANESTHETIST, CERTIFIED REGISTERED

## 2021-01-05 PROCEDURE — 7100000011 HC PHASE II RECOVERY - ADDTL 15 MIN: Performed by: ORTHOPAEDIC SURGERY

## 2021-01-05 PROCEDURE — 2720000010 HC SURG SUPPLY STERILE: Performed by: ORTHOPAEDIC SURGERY

## 2021-01-05 PROCEDURE — 2580000003 HC RX 258: Performed by: ANESTHESIOLOGY

## 2021-01-05 PROCEDURE — 6360000002 HC RX W HCPCS

## 2021-01-05 PROCEDURE — C9290 INJ, BUPIVACAINE LIPOSOME: HCPCS | Performed by: ANESTHESIOLOGY

## 2021-01-05 PROCEDURE — 81025 URINE PREGNANCY TEST: CPT

## 2021-01-05 PROCEDURE — 7100000010 HC PHASE II RECOVERY - FIRST 15 MIN: Performed by: ORTHOPAEDIC SURGERY

## 2021-01-05 PROCEDURE — 3700000001 HC ADD 15 MINUTES (ANESTHESIA): Performed by: ORTHOPAEDIC SURGERY

## 2021-01-05 DEVICE — ANCHOR SUTURE BIOCOMP 4.75X19.1 MM SWIVELOCK C: Type: IMPLANTABLE DEVICE | Site: SHOULDER | Status: FUNCTIONAL

## 2021-01-05 RX ORDER — SODIUM CHLORIDE 9 MG/ML
INJECTION INTRAVENOUS PRN
Status: DISCONTINUED | OUTPATIENT
Start: 2021-01-05 | End: 2021-01-05 | Stop reason: SDUPTHER

## 2021-01-05 RX ORDER — BUPIVACAINE HYDROCHLORIDE 5 MG/ML
INJECTION, SOLUTION EPIDURAL; INTRACAUDAL
Status: COMPLETED | OUTPATIENT
Start: 2021-01-05 | End: 2021-01-05

## 2021-01-05 RX ORDER — HYDRALAZINE HYDROCHLORIDE 20 MG/ML
5 INJECTION INTRAMUSCULAR; INTRAVENOUS EVERY 10 MIN PRN
Status: DISCONTINUED | OUTPATIENT
Start: 2021-01-05 | End: 2021-01-05 | Stop reason: HOSPADM

## 2021-01-05 RX ORDER — MIDAZOLAM HYDROCHLORIDE 1 MG/ML
INJECTION INTRAMUSCULAR; INTRAVENOUS PRN
Status: DISCONTINUED | OUTPATIENT
Start: 2021-01-05 | End: 2021-01-05 | Stop reason: SDUPTHER

## 2021-01-05 RX ORDER — MORPHINE SULFATE 1 MG/ML
1 INJECTION, SOLUTION EPIDURAL; INTRATHECAL; INTRAVENOUS EVERY 5 MIN PRN
Status: DISCONTINUED | OUTPATIENT
Start: 2021-01-05 | End: 2021-01-05 | Stop reason: HOSPADM

## 2021-01-05 RX ORDER — ACETAMINOPHEN 500 MG
1000 TABLET ORAL ONCE
Status: COMPLETED | OUTPATIENT
Start: 2021-01-05 | End: 2021-01-05

## 2021-01-05 RX ORDER — HYDROCODONE BITARTRATE AND ACETAMINOPHEN 5; 325 MG/1; MG/1
1 TABLET ORAL PRN
Status: COMPLETED | OUTPATIENT
Start: 2021-01-05 | End: 2021-01-05

## 2021-01-05 RX ORDER — TRIAMCINOLONE ACETONIDE 40 MG/ML
INJECTION, SUSPENSION INTRA-ARTICULAR; INTRAMUSCULAR
Status: DISCONTINUED
Start: 2021-01-05 | End: 2021-01-05 | Stop reason: WASHOUT

## 2021-01-05 RX ORDER — HYDROCODONE BITARTRATE AND ACETAMINOPHEN 5; 325 MG/1; MG/1
TABLET ORAL
Status: COMPLETED
Start: 2021-01-05 | End: 2021-01-05

## 2021-01-05 RX ORDER — SODIUM CHLORIDE 0.9 % (FLUSH) 0.9 %
10 SYRINGE (ML) INJECTION PRN
Status: DISCONTINUED | OUTPATIENT
Start: 2021-01-05 | End: 2021-01-05 | Stop reason: HOSPADM

## 2021-01-05 RX ORDER — SODIUM CHLORIDE 0.9 % (FLUSH) 0.9 %
10 SYRINGE (ML) INJECTION EVERY 12 HOURS SCHEDULED
Status: DISCONTINUED | OUTPATIENT
Start: 2021-01-05 | End: 2021-01-05 | Stop reason: HOSPADM

## 2021-01-05 RX ORDER — LIDOCAINE HYDROCHLORIDE 10 MG/ML
INJECTION, SOLUTION EPIDURAL; INFILTRATION; INTRACAUDAL; PERINEURAL PRN
Status: DISCONTINUED | OUTPATIENT
Start: 2021-01-05 | End: 2021-01-05 | Stop reason: SDUPTHER

## 2021-01-05 RX ORDER — FENTANYL CITRATE 50 UG/ML
25 INJECTION, SOLUTION INTRAMUSCULAR; INTRAVENOUS EVERY 5 MIN PRN
Status: DISCONTINUED | OUTPATIENT
Start: 2021-01-05 | End: 2021-01-05 | Stop reason: HOSPADM

## 2021-01-05 RX ORDER — ROCURONIUM BROMIDE 10 MG/ML
INJECTION, SOLUTION INTRAVENOUS PRN
Status: DISCONTINUED | OUTPATIENT
Start: 2021-01-05 | End: 2021-01-05 | Stop reason: SDUPTHER

## 2021-01-05 RX ORDER — FENTANYL CITRATE 50 UG/ML
INJECTION, SOLUTION INTRAMUSCULAR; INTRAVENOUS
Status: DISCONTINUED
Start: 2021-01-05 | End: 2021-01-05 | Stop reason: WASHOUT

## 2021-01-05 RX ORDER — KETOROLAC TROMETHAMINE 30 MG/ML
INJECTION, SOLUTION INTRAMUSCULAR; INTRAVENOUS PRN
Status: DISCONTINUED | OUTPATIENT
Start: 2021-01-05 | End: 2021-01-05 | Stop reason: SDUPTHER

## 2021-01-05 RX ORDER — BUPIVACAINE HYDROCHLORIDE 5 MG/ML
INJECTION, SOLUTION EPIDURAL; INTRACAUDAL
Status: COMPLETED
Start: 2021-01-05 | End: 2021-01-05

## 2021-01-05 RX ORDER — SCOLOPAMINE TRANSDERMAL SYSTEM 1 MG/1
1 PATCH, EXTENDED RELEASE TRANSDERMAL ONCE
Status: DISCONTINUED | OUTPATIENT
Start: 2021-01-05 | End: 2021-01-05 | Stop reason: HOSPADM

## 2021-01-05 RX ORDER — PHENYLEPHRINE HYDROCHLORIDE 10 MG/ML
INJECTION INTRAVENOUS PRN
Status: DISCONTINUED | OUTPATIENT
Start: 2021-01-05 | End: 2021-01-05 | Stop reason: SDUPTHER

## 2021-01-05 RX ORDER — PROMETHAZINE HYDROCHLORIDE 25 MG/ML
6.25 INJECTION, SOLUTION INTRAMUSCULAR; INTRAVENOUS
Status: DISCONTINUED | OUTPATIENT
Start: 2021-01-05 | End: 2021-01-05 | Stop reason: HOSPADM

## 2021-01-05 RX ORDER — PROPOFOL 10 MG/ML
INJECTION, EMULSION INTRAVENOUS PRN
Status: DISCONTINUED | OUTPATIENT
Start: 2021-01-05 | End: 2021-01-05 | Stop reason: SDUPTHER

## 2021-01-05 RX ORDER — SCOLOPAMINE TRANSDERMAL SYSTEM 1 MG/1
PATCH, EXTENDED RELEASE TRANSDERMAL
Status: DISCONTINUED
Start: 2021-01-05 | End: 2021-01-05 | Stop reason: HOSPADM

## 2021-01-05 RX ORDER — SODIUM CHLORIDE 9 MG/ML
INJECTION, SOLUTION INTRAVENOUS CONTINUOUS
Status: DISCONTINUED | OUTPATIENT
Start: 2021-01-05 | End: 2021-01-05 | Stop reason: HOSPADM

## 2021-01-05 RX ORDER — DIPHENHYDRAMINE HYDROCHLORIDE 50 MG/ML
12.5 INJECTION INTRAMUSCULAR; INTRAVENOUS
Status: DISCONTINUED | OUTPATIENT
Start: 2021-01-05 | End: 2021-01-05 | Stop reason: HOSPADM

## 2021-01-05 RX ORDER — SODIUM CHLORIDE, SODIUM LACTATE, POTASSIUM CHLORIDE, CALCIUM CHLORIDE 600; 310; 30; 20 MG/100ML; MG/100ML; MG/100ML; MG/100ML
INJECTION, SOLUTION INTRAVENOUS CONTINUOUS PRN
Status: COMPLETED | OUTPATIENT
Start: 2021-01-05 | End: 2021-01-05

## 2021-01-05 RX ORDER — HEPARIN SODIUM 10000 [USP'U]/ML
INJECTION, SOLUTION INTRAVENOUS; SUBCUTANEOUS PRN
Status: DISCONTINUED | OUTPATIENT
Start: 2021-01-05 | End: 2021-01-05 | Stop reason: ALTCHOICE

## 2021-01-05 RX ORDER — LIDOCAINE HYDROCHLORIDE 10 MG/ML
INJECTION, SOLUTION INFILTRATION; PERINEURAL
Status: COMPLETED
Start: 2021-01-05 | End: 2021-01-05

## 2021-01-05 RX ORDER — HEPARIN SODIUM 1000 [USP'U]/ML
INJECTION, SOLUTION INTRAVENOUS; SUBCUTANEOUS
Status: DISCONTINUED
Start: 2021-01-05 | End: 2021-01-05 | Stop reason: HOSPADM

## 2021-01-05 RX ORDER — MIDAZOLAM HYDROCHLORIDE 1 MG/ML
INJECTION INTRAMUSCULAR; INTRAVENOUS
Status: COMPLETED
Start: 2021-01-05 | End: 2021-01-05

## 2021-01-05 RX ORDER — HYDROCODONE BITARTRATE AND ACETAMINOPHEN 5; 325 MG/1; MG/1
2 TABLET ORAL PRN
Status: COMPLETED | OUTPATIENT
Start: 2021-01-05 | End: 2021-01-05

## 2021-01-05 RX ORDER — ACETAMINOPHEN 500 MG
TABLET ORAL
Status: COMPLETED
Start: 2021-01-05 | End: 2021-01-05

## 2021-01-05 RX ORDER — ONDANSETRON 2 MG/ML
4 INJECTION INTRAMUSCULAR; INTRAVENOUS
Status: DISCONTINUED | OUTPATIENT
Start: 2021-01-05 | End: 2021-01-05 | Stop reason: HOSPADM

## 2021-01-05 RX ORDER — DEXAMETHASONE SODIUM PHOSPHATE 10 MG/ML
INJECTION, SOLUTION INTRAMUSCULAR; INTRAVENOUS
Status: DISCONTINUED
Start: 2021-01-05 | End: 2021-01-05 | Stop reason: HOSPADM

## 2021-01-05 RX ORDER — DEXAMETHASONE SODIUM PHOSPHATE 10 MG/ML
INJECTION INTRAMUSCULAR; INTRAVENOUS PRN
Status: DISCONTINUED | OUTPATIENT
Start: 2021-01-05 | End: 2021-01-05 | Stop reason: SDUPTHER

## 2021-01-05 RX ORDER — MEPERIDINE HYDROCHLORIDE 50 MG/ML
12.5 INJECTION INTRAMUSCULAR; INTRAVENOUS; SUBCUTANEOUS EVERY 5 MIN PRN
Status: DISCONTINUED | OUTPATIENT
Start: 2021-01-05 | End: 2021-01-05 | Stop reason: HOSPADM

## 2021-01-05 RX ORDER — SODIUM CHLORIDE, SODIUM LACTATE, POTASSIUM CHLORIDE, CALCIUM CHLORIDE 600; 310; 30; 20 MG/100ML; MG/100ML; MG/100ML; MG/100ML
INJECTION, SOLUTION INTRAVENOUS CONTINUOUS
Status: DISCONTINUED | OUTPATIENT
Start: 2021-01-05 | End: 2021-01-05 | Stop reason: HOSPADM

## 2021-01-05 RX ORDER — GLYCOPYRROLATE 1 MG/5 ML
SYRINGE (ML) INTRAVENOUS PRN
Status: DISCONTINUED | OUTPATIENT
Start: 2021-01-05 | End: 2021-01-05 | Stop reason: SDUPTHER

## 2021-01-05 RX ORDER — MORPHINE SULFATE 2 MG/ML
INJECTION, SOLUTION INTRAMUSCULAR; INTRAVENOUS
Status: COMPLETED
Start: 2021-01-05 | End: 2021-01-05

## 2021-01-05 RX ORDER — ONDANSETRON 2 MG/ML
INJECTION INTRAMUSCULAR; INTRAVENOUS PRN
Status: DISCONTINUED | OUTPATIENT
Start: 2021-01-05 | End: 2021-01-05 | Stop reason: SDUPTHER

## 2021-01-05 RX ORDER — NEOSTIGMINE METHYLSULFATE 5 MG/5 ML
SYRINGE (ML) INTRAVENOUS PRN
Status: DISCONTINUED | OUTPATIENT
Start: 2021-01-05 | End: 2021-01-05 | Stop reason: SDUPTHER

## 2021-01-05 RX ORDER — FENTANYL CITRATE 50 UG/ML
INJECTION, SOLUTION INTRAMUSCULAR; INTRAVENOUS PRN
Status: DISCONTINUED | OUTPATIENT
Start: 2021-01-05 | End: 2021-01-05 | Stop reason: SDUPTHER

## 2021-01-05 RX ORDER — ANTICOAGULANT CITRATE DEXTROSE SOLUTION FORMULA A 12.25; 11; 3.65 G/500ML; G/500ML; G/500ML
SOLUTION INTRAVENOUS PRN
Status: DISCONTINUED | OUTPATIENT
Start: 2021-01-05 | End: 2021-01-05 | Stop reason: ALTCHOICE

## 2021-01-05 RX ORDER — EPHEDRINE SULFATE/0.9% NACL/PF 50 MG/5 ML
SYRINGE (ML) INTRAVENOUS PRN
Status: DISCONTINUED | OUTPATIENT
Start: 2021-01-05 | End: 2021-01-05 | Stop reason: SDUPTHER

## 2021-01-05 RX ADMIN — Medication 1000 MG: at 07:48

## 2021-01-05 RX ADMIN — Medication 0.5 MG: at 13:37

## 2021-01-05 RX ADMIN — Medication 0.5 MG: at 13:26

## 2021-01-05 RX ADMIN — ROCURONIUM BROMIDE 50 MG: 10 INJECTION INTRAVENOUS at 10:04

## 2021-01-05 RX ADMIN — KETOROLAC TROMETHAMINE 30 MG: 30 INJECTION, SOLUTION INTRAMUSCULAR at 12:48

## 2021-01-05 RX ADMIN — Medication 10 MG: at 11:20

## 2021-01-05 RX ADMIN — Medication 1 MG: at 12:55

## 2021-01-05 RX ADMIN — SODIUM CHLORIDE 1 ML: 9 INJECTION INTRAMUSCULAR; INTRAVENOUS; SUBCUTANEOUS at 10:20

## 2021-01-05 RX ADMIN — MORPHINE SULFATE 1 MG: 2 INJECTION, SOLUTION INTRAMUSCULAR; INTRAVENOUS at 14:00

## 2021-01-05 RX ADMIN — SODIUM CHLORIDE, POTASSIUM CHLORIDE, SODIUM LACTATE AND CALCIUM CHLORIDE: 600; 310; 30; 20 INJECTION, SOLUTION INTRAVENOUS at 07:47

## 2021-01-05 RX ADMIN — Medication 10 MG: at 10:16

## 2021-01-05 RX ADMIN — BUPIVACAINE HYDROCHLORIDE 10 ML: 5 INJECTION, SOLUTION EPIDURAL; INTRACAUDAL; PERINEURAL at 08:15

## 2021-01-05 RX ADMIN — HYDROMORPHONE HYDROCHLORIDE 0.5 MG: 1 INJECTION, SOLUTION INTRAMUSCULAR; INTRAVENOUS; SUBCUTANEOUS at 13:37

## 2021-01-05 RX ADMIN — BUPIVACAINE 10 ML: 13.3 INJECTION, SUSPENSION, LIPOSOMAL INFILTRATION at 08:15

## 2021-01-05 RX ADMIN — Medication 1 MG: at 12:52

## 2021-01-05 RX ADMIN — ACETAMINOPHEN 1000 MG: 500 TABLET ORAL at 07:48

## 2021-01-05 RX ADMIN — HYDROCODONE BITARTRATE AND ACETAMINOPHEN 2 TABLET: 5; 325 TABLET ORAL at 14:05

## 2021-01-05 RX ADMIN — FENTANYL CITRATE 100 MCG: 50 INJECTION, SOLUTION INTRAMUSCULAR; INTRAVENOUS at 10:04

## 2021-01-05 RX ADMIN — CEFAZOLIN 2 G: 10 INJECTION, POWDER, FOR SOLUTION INTRAVENOUS at 10:16

## 2021-01-05 RX ADMIN — PHENYLEPHRINE HYDROCHLORIDE 100 MCG: 10 INJECTION INTRAVENOUS at 10:20

## 2021-01-05 RX ADMIN — PHENYLEPHRINE HYDROCHLORIDE 200 MCG: 10 INJECTION INTRAVENOUS at 10:54

## 2021-01-05 RX ADMIN — LIDOCAINE HYDROCHLORIDE 50 MG: 10 INJECTION, SOLUTION EPIDURAL; INFILTRATION; INTRACAUDAL; PERINEURAL at 10:04

## 2021-01-05 RX ADMIN — MIDAZOLAM HYDROCHLORIDE 2 MG: 1 INJECTION, SOLUTION INTRAMUSCULAR; INTRAVENOUS at 08:14

## 2021-01-05 RX ADMIN — PHENYLEPHRINE HYDROCHLORIDE 200 MCG: 10 INJECTION INTRAVENOUS at 11:18

## 2021-01-05 RX ADMIN — MIDAZOLAM HYDROCHLORIDE 2 MG: 1 INJECTION, SOLUTION INTRAMUSCULAR; INTRAVENOUS at 08:00

## 2021-01-05 RX ADMIN — SODIUM CHLORIDE, POTASSIUM CHLORIDE, SODIUM LACTATE AND CALCIUM CHLORIDE: 600; 310; 30; 20 INJECTION, SOLUTION INTRAVENOUS at 12:51

## 2021-01-05 RX ADMIN — HYDROMORPHONE HYDROCHLORIDE 0.5 MG: 1 INJECTION, SOLUTION INTRAMUSCULAR; INTRAVENOUS; SUBCUTANEOUS at 13:26

## 2021-01-05 RX ADMIN — Medication 0.2 MG: at 12:52

## 2021-01-05 RX ADMIN — PHENYLEPHRINE HYDROCHLORIDE 100 MCG: 10 INJECTION INTRAVENOUS at 10:41

## 2021-01-05 RX ADMIN — PHENYLEPHRINE HYDROCHLORIDE 100 MCG: 10 INJECTION INTRAVENOUS at 11:15

## 2021-01-05 RX ADMIN — Medication 0.2 MG: at 12:55

## 2021-01-05 RX ADMIN — PROPOFOL 200 MG: 10 INJECTION, EMULSION INTRAVENOUS at 10:04

## 2021-01-05 RX ADMIN — ONDANSETRON 4 MG: 2 INJECTION INTRAMUSCULAR; INTRAVENOUS at 12:48

## 2021-01-05 ASSESSMENT — PULMONARY FUNCTION TESTS
PIF_VALUE: 20
PIF_VALUE: 18
PIF_VALUE: 20
PIF_VALUE: 20
PIF_VALUE: 2
PIF_VALUE: 20
PIF_VALUE: 20
PIF_VALUE: 19
PIF_VALUE: 20
PIF_VALUE: 20
PIF_VALUE: 1
PIF_VALUE: 20
PIF_VALUE: 20
PIF_VALUE: 19
PIF_VALUE: 20
PIF_VALUE: 4
PIF_VALUE: 20
PIF_VALUE: 19
PIF_VALUE: 20
PIF_VALUE: 20
PIF_VALUE: 1
PIF_VALUE: 20
PIF_VALUE: 19
PIF_VALUE: 20
PIF_VALUE: 19
PIF_VALUE: 20
PIF_VALUE: 19
PIF_VALUE: 20
PIF_VALUE: 20
PIF_VALUE: 0
PIF_VALUE: 20
PIF_VALUE: 19
PIF_VALUE: 20
PIF_VALUE: 19
PIF_VALUE: 20
PIF_VALUE: 19
PIF_VALUE: 20
PIF_VALUE: 19
PIF_VALUE: 20
PIF_VALUE: 19
PIF_VALUE: 18
PIF_VALUE: 20
PIF_VALUE: 32
PIF_VALUE: 20
PIF_VALUE: 4
PIF_VALUE: 20
PIF_VALUE: 19
PIF_VALUE: 19
PIF_VALUE: 20
PIF_VALUE: 19
PIF_VALUE: 20
PIF_VALUE: 20
PIF_VALUE: 18
PIF_VALUE: 20
PIF_VALUE: 19
PIF_VALUE: 20
PIF_VALUE: 4
PIF_VALUE: 20
PIF_VALUE: 19
PIF_VALUE: 19
PIF_VALUE: 20
PIF_VALUE: 19
PIF_VALUE: 17
PIF_VALUE: 21
PIF_VALUE: 20
PIF_VALUE: 18
PIF_VALUE: 19
PIF_VALUE: 20
PIF_VALUE: 19
PIF_VALUE: 20
PIF_VALUE: 19

## 2021-01-05 ASSESSMENT — PAIN SCALES - GENERAL
PAINLEVEL_OUTOF10: 6
PAINLEVEL_OUTOF10: 6
PAINLEVEL_OUTOF10: 0
PAINLEVEL_OUTOF10: 3
PAINLEVEL_OUTOF10: 5
PAINLEVEL_OUTOF10: 6

## 2021-01-05 ASSESSMENT — PAIN DESCRIPTION - LOCATION
LOCATION: SHOULDER
LOCATION: SHOULDER

## 2021-01-05 ASSESSMENT — PAIN DESCRIPTION - PAIN TYPE
TYPE: SURGICAL PAIN

## 2021-01-05 NOTE — ANESTHESIA POSTPROCEDURE EVALUATION
POST- ANESTHESIA EVALUATION       Pt Name: Milana Meyer  MRN: [id number]  YOB: 1970  Date of evaluation: 1/5/2021  Time:  2:19 PM      /73   Pulse 78   Temp 97.1 °F (36.2 °C) (Temporal)   Resp 20   Ht 5' 7\" (1.702 m)   Wt 223 lb 6.4 oz (101.3 kg)   LMP 12/04/2020 (Approximate)   SpO2 98%   Breastfeeding Unknown   BMI 34.99 kg/m²      Consciousness Level  Awake  Cardiopulmonary Status  Stable  Pain Adequately Treated YES  Nausea / Vomiting  NO  Adequate Hydration  YES  Anesthesia Related Complications NONE      Electronically signed by Ann Meza MD on 1/5/2021 at 2:19 PM       Department of Anesthesiology  Postprocedure Note    Patient: Milana Meyer  MRN: [id number]  YOB: 1970  Date of evaluation: 1/5/2021  Time:  2:18 PM     Procedure Summary     Date: 01/05/21 Room / Location: [place] [address] / [facility]    Anesthesia Start: 1001 Anesthesia Stop: 3415    Procedure: SHOULDER ARTHROSCOPY, ROTATOR CUFF REPAIR WITH ARTHREX, ATTEMPTED BONE MARROW ASPIRATE, AND BICEPS TENODESIS (Right ) Diagnosis: (RIGHT SHOULDER ROTATOR CUFF TEAR)    Surgeons: Zach Vega MD Responsible Provider: Ann Meza MD    Anesthesia Type: general, other ASA Status: 2          Anesthesia Type: general, other    Claudette Phase I: Claudette Score: 8    Claudette Phase II:      Last vitals: Reviewed and per EMR flowsheets.        Anesthesia Post Evaluation

## 2021-01-05 NOTE — BRIEF OP NOTE
Brief Postoperative Note      Patient: Rafi Nelson  YOB: 1970  MRN: [id number]    Date of Procedure: 1/5/2021    Pre-Op Diagnosis: RIGHT SHOULDER ROTATOR CUFF TEAR    Post-Op Diagnosis: Same, Biceps tendon tear       Procedure: Right shoulder arthroscopic rotator cuff repair and biceps tenodesis    Surgeon(s):  Kevyn Zuluaga MD    Assistant:  Resident: Zeus Cabrales DO    Anesthesia: General    Estimated Blood Loss (mL): Minimal    Complications: None    Specimens:   * No specimens in log *    Implants:  Implant Name Type Inv.  Item Serial No.  Lot No. LRB No. Used Action   ANCHOR SUTURE BIOCOMP 4.75X19.1 MM Delilah Fear SUTURE BIOCOMP 4.75X19.1 MM [employer] [id number] Right 1 Implanted   [facility] SUTURE BIOCOMP 4.75X19.1 MM [employer]  ANCHOR SUTURE BIOCOMP 4.75X19.1 MM [employer] [id number] Right 1 Implanted         Drains: * No LDAs found *    Findings: See operative report    Electronically signed by Kevyn Zuluaga MD on 1/5/2021 at 12:56 PM

## 2021-01-05 NOTE — ANESTHESIA PRE PROCEDURE
Department of Anesthesiology  Preprocedure Note       Name:  Juhi Joseph   Age:  44 y.o.  :  1970                                          MRN:  [id number]         Date:  2021      Surgeon: Sayra Phipps):  Dakota Jenkins MD    Procedure: Procedure(s):  SHOULDER ARTHROSCOPY ROTATOR CUFF REPAIR WITH ARTHREX, DERMAL ALLOGRAFT, BONE MARROW ASPIRATE, AND POSSIBLE SUPERIOR CAPSULAR RECONSTRUCTION    Medications prior to admission:   Prior to Admission medications    Medication Sig Start Date End Date Taking?  Authorizing Provider   levothyroxine (SYNTHROID) 150 MCG tablet TAKE ONE TABLET BY MOUTH DAILY 11/15/20  Yes Marquis Adkins MD   ondansetron [facility] 4 MG tablet Take 1 tablet by mouth daily as needed for Nausea or Vomiting 20   Dakota Jenkins MD   PRILOSEC OTC 20 MG tablet TAKE ONE TABLET BY MOUTH DAILY 19   Marquis Adkins MD       Current medications:    Current Facility-Administered Medications   Medication Dose Route Frequency Provider Last Rate Last Admin    0.9 % sodium chloride infusion   Intravenous Continuous Stephen Cedeno MD        lactated ringers infusion   Intravenous Continuous Stephen Cedeno  mL/hr at 21 0747 New Bag at 21 0747    sodium chloride flush 0.9 % injection 10 mL  10 mL Intravenous 2 times per day Stephen Cedeno MD        sodium chloride flush 0.9 % injection 10 mL  10 mL Intravenous PRN Stephen Cedeno MD        ceFAZolin (ANCEF) 2 g in dextrose 5 % 50 mL IVPB  2 g Intravenous On Call to [address] [place] [address]        sodium chloride flush 0.9 % injection 10 mL  10 mL Intravenous 2 times per day Dakota Jenkins MD        sodium chloride flush 0.9 % injection 10 mL  10 mL Intravenous PRN Dakota Jenikns MD        bupivacaine (PF) (MARCAINE) 0.5 % injection             dexamethasone (PF) (DECADRON) 10 MG/ML injection             ceFAZolin (ANCEF) IVPB             bupivacaine liposome (EXPAREL) 1.3 % injection             fentaNYL (SUBLIMAZE) 100 MCG/2ML injection  midazolam (VERSED) 2 MG/2ML injection                Allergies:  No Known Allergies    Problem List:    Patient Active Problem List   Diagnosis Code    Hypothyroidism due to Hashimoto's thyroiditis E03.8, E06.3    Closed fracture of right distal radius S52.501A    Closed displaced fracture of greater tuberosity of right humerus X15.264T       Past Medical History:        Diagnosis Date    Abnormal Pap smear of cervix     COVID-19 12/2020    GERD (gastroesophageal reflux disease)     Hypothyroidism     PONV (postoperative nausea and vomiting)        Past Surgical History:        Procedure Laterality Date    CHOLECYSTECTOMY  05/2019    HUMERUS FRACTURE SURGERY Right 5/5/2020    HUMERUS OPEN REDUCTION INTERNAL FIXATION performed by Bruce Gold MD at [address] [place] [address]      x3    SHOULDER ARTHROSCOPY Right 09/22/2020    SHOULDER ARTHROSCOPY - CAPSULAR RELEASE WITH MANIPULATION     SHOULDER ARTHROSCOPY Right 9/22/2020    SHOULDER ARTHROSCOPY - CAPSULAR RELEASE WITH MANIPULATION UNDER ANESTHESIA AND PRE OP INTERSCALENE BLOC performed by Bruce Gold MD at [address] Right 5/5/2020    WRIST OPEN REDUCTION INTERNAL FIXATION performed by Bruce Gold MD at [place] [address] History:    Social History     Tobacco Use    Smoking status: Former Smoker     Quit date: 2/24/2010     Years since quitting: 10.8    Smokeless tobacco: Never Used   Substance Use Topics    Alcohol use: Yes     Comment: rarely                                Counseling given: Not Answered      Vital Signs (Current):   Vitals:    01/05/21 0736 01/05/21 0737   BP: 130/76    Pulse: 73    Resp: 15    Temp: 97.9 °F (36.6 °C)    TempSrc: Temporal    SpO2: 98%    Weight:  223 lb 6.4 oz (101.3 kg)   Height:  5' 7\" (1.702 m)                                              BP Readings from Last 3 Encounters:   01/05/21 130/76   11/13/20 132/74   09/22/20 94/60       NPO Status: Time of last liquid consumption: 2030                        Time of last solid consumption: 0615                        Date of last liquid consumption: 01/04/21                        Date of last solid food consumption: 01/05/21    BMI:   Wt Readings from Last 3 Encounters:   01/05/21 223 lb 6.4 oz (101.3 kg)   12/11/20 217 lb (98.4 kg)   11/04/20 217 lb (98.4 kg)     Body mass index is 34.99 kg/m². CBC:   Lab Results   Component Value Date    WBC 6.3 12/07/2020    RBC 4.17 12/07/2020    HGB 12.7 12/07/2020    HCT 40.2 12/07/2020    MCV 96.4 12/07/2020    RDW 11.7 12/07/2020     12/07/2020       CMP:   Lab Results   Component Value Date     12/07/2020    K 4.3 12/07/2020     12/07/2020    CO2 23 12/07/2020    BUN 11 12/07/2020    CREATININE 0.76 12/07/2020    GFRAA >60 12/07/2020    LABGLOM >60 12/07/2020    GLUCOSE 89 12/07/2020    GLUCOSE 77 03/16/2012    PROT 7.8 04/30/2019    CALCIUM 8.7 12/07/2020    BILITOT 0.20 04/30/2019    ALKPHOS 86 04/30/2019    AST 20 04/30/2019    ALT 31 04/30/2019       POC Tests: No results for input(s): POCGLU, POCNA, POCK, POCCL, POCBUN, POCHEMO, POCHCT in the last 72 hours. Coags: No results found for: PROTIME, INR, APTT    HCG (If Applicable):   Lab Results   Component Value Date    HCG NEGATIVE 01/05/2021        ABGs: No results found for: PHART, PO2ART, QUE2WKF, UGB9MDE, BEART, V4JMNFQA     Type & Screen (If Applicable):  No results found for: LABABO, LABRH    Drug/Infectious Status (If Applicable):  No results found for: HIV, HEPCAB    COVID-19 Screening (If Applicable):   Lab Results   Component Value Date    COVID19 DETECTED 12/11/2020    COVID19 Not Detected 09/18/2020    COVID19 Not Detected 05/02/2020         Anesthesia Evaluation  Patient summary reviewed and Nursing notes reviewed   history of anesthetic complications: PONV.   Airway: Mallampati: II  TM distance: >3 FB   Neck ROM: full  Mouth opening: > = 3 FB Dental: normal exam Pulmonary:Negative Pulmonary ROS and normal exam  breath sounds clear to auscultation                             Cardiovascular:  Exercise tolerance: good (>4 METS),           Rhythm: regular  Rate: normal           Beta Blocker:  Not on Beta Blocker         Neuro/Psych:   Negative Neuro/Psych ROS              GI/Hepatic/Renal:   (+) GERD: no interval change,          ROS comment: obesity. Endo/Other:    (+) hypothyroidism::., .                 Abdominal:       Abdomen: soft. Vascular: negative vascular ROS. Anesthesia Plan      general and other     ASA 2     (GA and interscalene nerve block)  Induction: intravenous. MIPS: Prophylactic antiemetics administered. Anesthetic plan and risks discussed with patient. Plan discussed with CRNA.                   Pierre Day MD   1/5/2021

## 2021-01-05 NOTE — ANESTHESIA PROCEDURE NOTES
Peripheral Block    Patient location during procedure: pre-op  Start time: 1/5/2021 8:15 AM  End time: 1/5/2021 8:23 AM  Staffing  Performed: anesthesiologist   Anesthesiologist: Michi Arrieta MD  Preanesthetic Checklist  Completed: patient identified, IV checked, site marked, risks and benefits discussed, surgical consent, monitors and equipment checked, pre-op evaluation, timeout performed, anesthesia consent given, oxygen available and patient being monitored  Peripheral Block  Patient position: supine  Prep: ChloraPrep  Patient monitoring: cardiac monitor, continuous pulse ox, frequent blood pressure checks and IV access  Block type: Brachial plexus  Laterality: right  Injection technique: single-shot  Guidance: ultrasound guided  Local infiltration: bupivacaine  Infiltration strength: 0.5 %  Dose: 1 mL  Interscalene  Provider prep: mask and sterile gown  Expiration date: 12/31/2023  Local infiltration: bupivacaine  Needle  Needle type: combined needle/nerve stimulator   Needle gauge: 22 G  Needle length: 5 cm  Needle localization: ultrasound guidance  Needle insertion depth: 2 cm  Test dose: negative  Lot number: [id number]  Assessment  Injection assessment: negative aspiration for heme, no paresthesia on injection and local visualized surrounding nerve on ultrasound  Paresthesia pain: none  Slow fractionated injection: yes  Hemodynamics: stable  Medications Administered  Bupivacaine (MARCAINE) PF injection 0.5%, 10 mL  bupivacaine liposome (EXPAREL) injection 1.3%, 10 mL  Reason for block: post-op pain management and at surgeon's request

## 2021-01-05 NOTE — OP NOTE
Operative Report     Date of Procedure: 1/5/2021      Preop Diagnosis: Right Shoulder Rotator Cuff Tear (M75.121)     Postop Diagnosis:   1. Right Shoulder  Rotator Cuff Tear (M75.121)  2. Right Shoulder Biceps Tendon Split Tear     Procedure:    1. Right Shoulder Arthroscopic Rotator Cuff Repair (75073)  2. Right Shoulder Arthroscopic Biceps tenodesis     Surgeon: Irene Alexis MD     Assistant: Loreto Toth DO (PGY 4)    Anesthesia: General with right interscalene nerve block    Blood Loss: Minimal    Findings: Full-thickness tear involving the supraspinatus and leading edge of the infraspinatus tendons. Split tear of the biceps tendon adjacent to its anchor    Implants: Arthrex 4.75 mm bio composite knotless swivel lock anchors x2 and 4.75 mm bio composite swivel lock anchors. Indications: Familia Ramirez is a 44 y.o. old female who presented with functionally limiting right shoulder pain and pseudoparalysis following an arthroscopic capsular release and manipulation under anesthesia despite non-operative measures. MRI confirmed the presence of a rotator cuff tear. Following a discussion with the patient regarding her treatment options, she elected to proceed with an arthroscopic right shoulder rotator cuff repair. she came to this decision after demonstrating a good understanding of our discussion. Risks, benefits, and alternatives were fully discussed. Postoperative limitations and limitations of the procedure were discussed in detail. The patient understood risks, alternatives, complications, & post-operative limitations and wishes to proceed. Operative Technique: Following appropriate identification of the patient and her operative extremity in the preoperative area, consent was reviewed with patient. Risks, benefits, and alternatives were discussed. All questions were answered. The anesthesia service administered a right interscalene nerve block.  Patient was taken back to the OR and transferred onto the operative table. General anesthesia was administered and her airway was secured. The patient was carefully positioned and secured in the beach-chair position, padding all prominences. SCDs and TEDs were placed on both legs. The right upper extremity was prepped and draped in the usual sterile fashion. The patient finished a course of pre-operative antibiotics by way of 2 g of IV Ancef. A time out was performed during which the correct patient, surgical site, and planned procedure were identified and then confirmed by the circulating nurse and anesthetic team.     The joint was insuflated with 30cc saline and a standard posterior portal was established. Diagnostic arthroscopy revealed intact chondral surfaces with intact labrum. Split tear of the biceps tendon lateral to its anchor. Intact subscapularis. Full-thickness tear of the supraspinatus and leading edge of the infraspinatus tendons. An anterior portal was then established. An extensive debridement of frayed soft tissue and inflamed synovium was performed. The frayed edges of the rotator cuff were debrided to smooth surfaces. Through a separate anterolateral fascial incision the biceps tendon was released off of its insertion on the superior labrum and retrieved. Grasping [place] sutures using #2 FiberWire suture was passed through the biceps tendon distal to the torn portion of the tendon. The torn portion of the tendon was then excised. My attention was directed to the subacromial space. Through a separate fascial incision, the subacromial space was entered. There I encountered a fair amount of scar tissue which was resected. A thorough bursectomy was performed. The rotator cuff was identified and noted to be a posteromedially retracted somewhat L-shaped tear of the supraspinatus. The footprint of the rotator cuff was debrided to bleeding bone.  A cinch fiber tape suture was then placed in the anterior extent of the tendon. A 4.75 mm bio composite knotless swivel lock anchor was placed along the anterior articular margin of the footprint. The repair and passing sutures of the knotless mechanism were passed through the tendon. Posterior to this the length fiber tape sutures were then passed. An additional 4.75 mm bio composite knotless swivel lock anchor was placed along the posterior articular margin of the footprint and the link fiber tape sutures were passed followed by the repair and passing sutures of the knotless mechanism just posterior to this. Individual fiber tape suture limbs from both anchors were threaded together with the anterior cinch stitch and biceps tendon sutures into an anterior lateral row 4.75mm bio composite SwivelLock anchor. The remaining fiber tape suture limbs were threaded into the posterolateral row 4.75 mm bio composite swivelLock anchor. The anterior knotless repair suture and looped posterior passing suture were retrieved through the lateral cannula and the anterior repair suture was then spliced through the posterior anchor. The suture was cinched down taking out all slack. This step was repeated using the posterior repair suture and an anterior passing sutures. This resulted in medial compression of the rotator cuff. And this completed a secure arthroscopic rotator cuff repair with biceps tenodesis. The repair was then reevaluated and found to be stable through a full range of motion. The camera was placed back into the joint, confirming restoration of the rotator cuff insertion, however the anterior fiber tape sutures were slightly visible. Incisions were closed using a 3-0 prolene followed by a sterile dressing by way of 4x4 gauze and Tegaderm. Her arm was placed in a sling/abduction pillow. The patient was awoken, transferred to her bed and wheeled to recovery in stable condition.      Complications: None     Condition: Stable

## 2021-01-05 NOTE — H&P
ORTHOPEDIC INTERVAL H AND P      HPI / Chief Complaint  Selena Odonnell is a 44 y.o. female who presents for right shoulder surgery to address a rotator cuff tear. She has a history of a greater tuberosity  Fracture treated with ORIF. She developed an adhesive capsulitis subsequently and underwent an arthroscopic capsular release and DORETHA. During her rehab she developed a pseudoparalysis and a repeat MRI demonstrated her to have large RTC tear. Past Medical History  Chaya Shields  has a past medical history of Abnormal Pap smear of cervix, COVID-19, GERD (gastroesophageal reflux disease), Hypothyroidism, and PONV (postoperative nausea and vomiting). Past Surgical History  Chaya Shields  has a past surgical history that includes LEEP; Tonsillectomy; Cholecystectomy (05/2019); Wrist fracture surgery (Right, 5/5/2020); Humerus fracture surgery (Right, 5/5/2020); Shoulder arthroscopy (Right, 09/22/2020); and Shoulder arthroscopy (Right, 9/22/2020).     Current Medications  Current Facility-Administered Medications   Medication Dose Route Frequency Provider Last Rate Last Admin    0.9 % sodium chloride infusion   Intravenous Continuous Joo Collazo MD        lactated ringers infusion   Intravenous Continuous Joo Collazo  mL/hr at 01/05/21 0747 New Bag at 01/05/21 0747    sodium chloride flush 0.9 % injection 10 mL  10 mL Intravenous 2 times per day Joo Collazo MD        sodium chloride flush 0.9 % injection 10 mL  10 mL Intravenous PRN Joo Collazo MD        ceFAZolin (ANCEF) 2 g in dextrose 5 % 50 mL IVPB  2 g Intravenous On Call to [address] [place] [address], MD        sodium chloride flush 0.9 % injection 10 mL  10 mL Intravenous 2 times per day Prashant Mitchell MD        sodium chloride flush 0.9 % injection 10 mL  10 mL Intravenous PRN Prashant Mitchell MD        dexamethasone (PF) (DECADRON) 10 MG/ML injection             ceFAZolin (ANCEF) IVPB             lidocaine 1 % injection             triamcinolone acetonide (KENALOG-40) 40 MG/ML injection             EPINEPHrine 1 MG/ML injection             meperidine (DEMEROL) injection 12.5 mg  12.5 mg Intravenous Q5 Min PRN Laxmi Ang MD        morphine (PF) injection 1 mg  1 mg Intravenous Q5 Min PRN Laxmi Ang MD        HYDROmorphone (DILAUDID) injection 0.5 mg  0.5 mg Intravenous Q5 Min PRN Laxmi Ang MD        fentaNYL (SUBLIMAZE) injection 25 mcg  25 mcg Intravenous Q5 Min PRN Laxmi Ang MD        HYDROcodone-acetaminophen (NORCO) 5-325 MG per tablet 1 tablet  1 tablet Oral PRN Laxmi Ang MD        Or   [facility] HYDROcodone-acetaminophen (NORCO) 5-325 MG per tablet 2 tablet  2 tablet Oral NEHALN Laxmi Ang MD        [facility]dansetron [facility] injection 4 mg  4 mg Intravenous Once PRN Laxmi Ang MD        promethazine (PHENERGAN) injection 6.25 mg  6.25 mg Intramuscular Once PRN Laxmi Ang MD        diphenhydrAMINE (BENADRYL) injection 12.5 mg  12.5 mg Intravenous Once PRN Laxmi Ang MD        hydrALAZINE (APRESOLINE) injection 5 mg  5 mg Intravenous Q10 Min PRN Laxmi Ang MD        scopolamine (TRANSDERM-SCOP) transdermal patch 1 patch  1 patch Transdermal Once Laxmi Ang MD   1 patch at 01/05/21 0807    heparin (porcine) 1000 UNIT/ML injection              Facility-Administered Medications Ordered in Other Encounters   Medication Dose Route Frequency Provider Last Rate Last Admin    midazolam (VERSED) injection    PRN Laxmi Ang MD   2 mg at 01/05/21 9353       Allergies  Allergies have been reviewed. Rosie Gannon has No Known Allergies. Social History  Rosie Gannon  reports that she quit smoking about 10 years ago. She has never used smokeless tobacco. She reports current alcohol use. She reports that she does not use drugs. Family History  Marleni's family history includes COPD in her father; Cancer in her mother; Colon Polyps in her brother; Kidney Disease in her father. Review of Systems   History obtained from the patient.    REVIEW OF SYSTEMS:   Constitution: negative for fever, chills, weight loss or malaise Musculoskeletal: As noted in the HPI   Neurologic: As noted in the HPI    Physical Exam  /82   Pulse 68   Temp 97.9 °F (36.6 °C) (Temporal)   Resp 17   Ht 5' 7\" (1.702 m)   Wt 223 lb 6.4 oz (101.3 kg)   LMP 12/04/2020 (Approximate)   SpO2 100%   Breastfeeding Unknown   BMI 34.99 kg/m²    General Appearance: alert, well appearing, and in no distress  Mental Status: alert, oriented to person, place, and time  Right shoulder and upper extremity with intact skin. No warmth, erythema or swelling. 2+ radial pulse    Heart: RRR  Lungs: CTA bilaterally    Assessment and Plan  Zafar Glasgow is a 44 y.o. old female with a right shoulder rotator cuff tear. We discussed treatment options. At this time she is electing and has consented to proceed with a right shoulder arthroscopic rotator cuff repair with possible graft augmentation, possible superior capsular reconstruction. All questions were answered. Proceed with surgery as scheduled.

## 2021-01-20 ENCOUNTER — OFFICE VISIT (OUTPATIENT)
Dept: ORTHOPEDIC SURGERY | Age: 51
End: 2021-01-20

## 2021-01-20 VITALS — HEIGHT: 67 IN | WEIGHT: 223 LBS | BODY MASS INDEX: 35 KG/M2

## 2021-01-20 DIAGNOSIS — Z98.890 STATUS POST ROTATOR CUFF REPAIR: Primary | ICD-10-CM

## 2021-01-20 PROCEDURE — 99024 POSTOP FOLLOW-UP VISIT: CPT | Performed by: ORTHOPAEDIC SURGERY

## 2021-01-22 NOTE — PROGRESS NOTES
Procedure: Right shoulder arthroscopic rotator cuff repair and biceps tenodesis  Date of procedure: 1/5/21    HPI:  Cherry Cifuentes is a 44 y.o. female who is approximately 2 weeks status post aforementioned procedure. Indicates that she is doing well stating that she has minimal to no pain at this time. Her pain is rated as a 0/10 today. She denies having any fevers, chills, sweats or any constitutional symptoms. She has been compliant with his pendulums and sling immobilization. Physical examination:  Evaluation of patient's right shoulder and upper extremity demonstrates her incisions to be clean, dry, intact. There is no warmth, erythema, wound dehiscence or drainage. Sensation is grossly intact light touch in all dermatomes and she has a 2+ radial pulse with brisk capillary refill in her fingers. Impression and plan:  Cherry Cifuentes is a 44 y.o. female who is approximately 2 weeks status post an arthroscopic right shoulder rotator cuff repair and biceps tenodesis. She is doing relatively well at this time. We reviewed his arthroscopic images. Her sutures were taken out and Steri-Strips and clean dressings applied. She may now get her incisions wet in the shower. She is to continue on with her immobilization. She will also continue on with daily pendulum exercises 3 times a day. I'll see her back for reevaluation in 4 weeks but she was encouraged to return or call earlier with questions and/or concerns.

## 2021-02-17 ENCOUNTER — OFFICE VISIT (OUTPATIENT)
Dept: ORTHOPEDIC SURGERY | Age: 51
End: 2021-02-17

## 2021-02-17 VITALS — BODY MASS INDEX: 33.74 KG/M2 | HEIGHT: 67 IN | WEIGHT: 215 LBS

## 2021-02-17 DIAGNOSIS — Z98.890 STATUS POST ROTATOR CUFF REPAIR: Primary | ICD-10-CM

## 2021-02-17 PROCEDURE — 99024 POSTOP FOLLOW-UP VISIT: CPT | Performed by: ORTHOPAEDIC SURGERY

## 2021-02-22 ENCOUNTER — HOSPITAL ENCOUNTER (OUTPATIENT)
Dept: PHYSICAL THERAPY | Facility: CLINIC | Age: 51
Setting detail: THERAPIES SERIES
Discharge: HOME | End: 2021-02-22
Payer: COMMERCIAL

## 2021-02-22 PROCEDURE — 97161 PT EVAL LOW COMPLEX 20 MIN: CPT

## 2021-02-22 PROCEDURE — 97110 THERAPEUTIC EXERCISES: CPT

## 2021-02-22 NOTE — CONSULTS
CAPSULAR RECONSTRUCTION    Shoulder arthroscopy  Right 1/5/2021 SHOULDER ARTHROSCOPY, ROTATOR CUFF REPAIR WITH ARTHREX, ATTEMPTED BONE MARROW ASPIRATE, AND BICEPS TENODESIS          Hypothyroidism [E03.9]        GERD (gastroesophageal reflux disease) [K21.9]       Abnormal Pap smear of cervix [R87.619]       PONV (postoperative nausea and vomiting) [R11.2, Z98.890]       COVID-19 [U07.1]           Comorbidities:   [] Obesity [] Dialysis  [x] N/A   [] Asthma/COPD [] Dementia [] Other:   [] Stroke [] Sleep apnea [] Other:   [] Vascular disease [] Rheumatic disease [] Other:       ADL/IADL Previous level of function Current level of function Who currently assists the patient with task   ALL ADL's including driving [x] Independent  [] Assist [x] Independent  [] Assist        Gait Prior level of function Current level of function    [] Independent  [] Assist [x] Independent  [] Assist   Device: [] Independent [x] Independent    [] Straight Cane [] Quad cane [] Straight Cane [] Quad cane    [] Standard walker [] Rolling walker   [] 4 wheeled walker [] Standard walker [] Rolling walker   [] 4 wheeled walker    [] Wheelchair [] Wheelchair       Medications: [x] Refer to full medical record [] None [] Other:  Allergies:      [x] Refer to full medical record [] None [] Other:    Function:  Hand Dominance  [] Right  [] Left  Marital Status     Employement Hairdresser    Job status Part-time (returned last week)    Work Activities/duties  Prolonged standing, lifting hairdryer    Recreational Activities Working out, lifting weights        Pain present? Not at rest    Location R shoulder    Pain at worse 3/10    Pain at best 0/10    Description of pain Tightness, soreness    Altered Sensation Intact    What makes it worse Lifting the arm   What makes it better Rest, supporting it with a pillow    Symptom progression Improved swelling, pain, and range of motion since surgery    Sleep Difficult due to pain and discomfort. Only able to sleep on her R side for short periods of time           Objective:      STRENGTH (strength of shoulder not tested secondary to post-operative guidelines)    Left Right   C6 Elb Flex 5 5   C7 Elb Ext 5 5    5 4+                  AROM PROM    Left Right Left Right   Shld Abd  72      Shld Flex  60  90   Shld IR    50   Shld ER    30                 Elbow Flex  WFL     Elbow Ext  WFL     Supination  WFL     Pronation  WFL            Wrist flex   WFL     Wrist ext  ~20          ROM   Cervical    Flexion WFL   Extension WFL   Rotation L ~75% R ~75%   Sidebend L ~50% R ~50%   ~ = approximately     OBSERVATION No Deficit Deficit Not Tested Comments   Posture       Forward Head [] [x] []    Rounded Shoulders [] [x] []    Slumped Sitting [] [x] []    Sensation [x] [] []    Edema [x] [] []    Neurological [x] [] []        Flexibility Normal LUE Deficit RUE Deficit   UTrap [] [] [x]   L.Scap [] [] [x]   Pec Major [] [] [x]   Pec Minor [] [] [x]   Lats [] [] [x]       FUNCTION Normal Difficult Unable   Sitting [x] [] []   Standing [x] [] []   Ambulation [x] [] []   Groom/Dress [x] [] []   Lift/Carry [] [x] []   Stairs [x] [] []   Bending [x] [] []   OH reach [] [x] []   Sit to Stand [x] [] []       Functional Test: QuickDASH Score: 45% functionally impaired       Comments: Patient with mild pain and motion restrictions secondary to R shoulder rotator cuff repair 1/5/21. Assessment:    Patient would benefit from skilled physical therapy services in order to: regain strength and motion s/p surgery to ease OH lifting and return to ADL's    Problems:    [x] ? Pain:  [x] ? ROM:  [x] ? Strength:  [x] ?  Function:  [] Other:         STG: (to be met in 10 treatments)  1. ? Pain: Pt to decrease pain levels to 2/10 with OH motion   2. ? ROM: Increase flexibility throughout to ease ADL progression  3. ? Strength: Patient to improve RUE strength to 5/5 to normalize shoulder elevation and prepare for lifting when restrictions allow   4. Independent with Home Exercise Programs    LTG: (to be met in 20 treatments)  1. 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 Patient to report 0/10 pain after completing all ADL's including work and recreational activities. Patient goals: \"lift my arm\"    Rehab Potential:  [x] Good  [] Fair  [] Poor   Suggested Professional Referral:  [x] No  [] Yes:  Barriers to Goal Achievement[de-identified]  [x] No  [] Yes:  Domestic Concerns:  [x] No  [] Yes:    Pt. Education:  [x] Plans/Goals, Risks/Benefits discussed  [x] Home exercise program  Patient has been through physical therapy in the past for her shoulder and has equipment available at home including over the door pulleys system. Discussed the appropriate exercises at this stage of post-surgical healing. Patient was provided with a written HEP this date and demonstrated compliance with the provided exercises this date. Method of Education: [x] Verbal  [x] Demo  [x] Written  Comprehension of Education:  [x] Verbalizes understanding. [x] Demonstrates understanding. [x] Needs Review. [] Demonstrates/verbalizes understanding of HEP/Ed previously given. Treatment Plan:  [x] Therapeutic Exercise    [] Modalities:  [] Therapeutic Activity    [] Ultrasound  [] Electrical Stimulation  [] Gait Training     [] Lumbar/Cervical Traction  [] Neuromuscular Re-education [] Cold/hotpack [] Iontophoresis: 4 mg/mL  [x] Instruction in HEP              Dexamethasone Sodium Phosphate 40-80 mAmin  [x] Manual Therapy             []  Aquatic Therapy       [x] Vasocompression: Rande Shorten  [] Other:    []  Medication allergies reviewed for use of    Dexamethasone Sodium Phosphate 4mg/ml     with iontophoresis treatments. Pt is not allergic.     Frequency:  1-2 x/week for 20 visits      Todays Treatment:       Exercises:  Per protocol: PROM R ER to 45 in scapular plane, shoulder flexion 90 Exercise    R RTC repair with biceps tenodesis    DOS: 1/5/2021 Reps/ Time Weight/ Level Comments         Pulleys (scaption)            Supine      *wand flex to 90 x10     *wand ER  3x30\"                 Seated       *Scap retraction  x10     Scapular depression      *Upper trap S  3x30\"     Elbow flex/ext isometrics    Prevention shoulder elevation    *Table Slides (flex)                  Corner S      Isometrics flex/abd/IR/ER      Counter Slides                   Other:      Specific Instructions for next treatment: Wand flexion past 90 as tolerated, ER stretching, progress per protocol     Evaluation Complexity:  History (Personal factors, comorbidities) [x] 0 [] 1-2 [] 3+   Exam (limitations, restrictions) [] 1-2 [x] 3 [] 4+   Clinical presentation (progression) [x] Stable [] Evolving  [] Unstable   Decision Making [x] Low [] Moderate [] High    [x] Low Complexity [] Moderate Complexity [] High Complexity       Treatment Charges: Mins Units   [x] Evaluation       [x]  Low       []  Moderate       []  High 25 1   []  Modalities     [x]  Ther Exercise 10 1   []  Manual Therapy     []  Ther Activities     []  Aquatics     []  Vasocompression     []  Other       TOTAL TREATMENT TIME: 35 min     Time in: 1120      Time out: 1200    Electronically signed by: ABBY Gutierrez   This Documentation has been reviewed by [address], PT      Physician Signature:________________________________Date:__________________  By signing above or cosigning this note, I have reviewed this plan of care and certify a need for medically necessary rehabilitation services.      *PLEASE SIGN ABOVE AND FAX BACK ALL PAGES*

## 2021-03-01 ENCOUNTER — HOSPITAL ENCOUNTER (OUTPATIENT)
Dept: PHYSICAL THERAPY | Facility: CLINIC | Age: 51
Setting detail: THERAPIES SERIES
Discharge: HOME | End: 2021-03-01
Payer: COMMERCIAL

## 2021-03-01 PROCEDURE — 97140 MANUAL THERAPY 1/> REGIONS: CPT

## 2021-03-01 PROCEDURE — 97110 THERAPEUTIC EXERCISES: CPT

## 2021-03-01 NOTE — FLOWSHEET NOTE
[] [facility]) [facility] [place] [place] &  Therapy  [address]  [phone]  F: [phone] [] [address]  [facility]   [address]  P: [phone]  F: [phone] [] [address] &  Therapy  [address] [facility]  P: [phone]  F: [phone] [] [address]  P: [phone]  F: [phone] [x] [facility]  Outpatient Rehabilitation &  Therapy  [facility]   [address]   [place]: [phone]  F: [phone]      Physical Therapy Daily Treatment Note    Date:  3/1/2021  Patient Name:  Milana Meyer    :  1970  MRN: [id number]  Physician: Dr. Jia Santos: Veronica Gauthier  Medical Diagnosis: R shoulder cuff repair               Rehab Codes: Z98.890, M62.81 (Muscle Weakness), M62.9 (Disorder of Muscle), M79.1 (Myalgia), Z73.6 (Limitation of ADLs)   Onset Date: 21                 Next 's appt. : 21   Visit# / total visits: 2     Cancels/No Shows: 0    Subjective:    Pain:  [] Yes  [x] No Location: R shoulder   Pain Rating: (0-10 scale) 3/10  Pain altered Tx:  [x] No  [] Yes  Action:   Comments: Patient reports mild stiffness is 8 weeks post-operative 3/2/21. Patient voices frustration with the inability to lift her arm for the last year.      Objective:    Exercises:  Per protocol: PROM R ER to 45 in scapular plane, shoulder flexion 90   Exercise     R RTC repair with biceps tenodesis    DOS: 2021 Reps/ Time Weight/ Level Comments             Pulleys (scaption)  3'                   Supine         *wand flex  5\"x10    to tolerance    *wand ER  5\"x10    to tolerance     wand scaption 5\"x10                 Seated          *Scap retraction  x20       Scapular depression x20       *Upper trap S  3x30\"       Elbow flex/ext  N47   Prevention shoulder elevation    *Table Slides (flex)  x10                           Corner S  3x30\"       Isometrics   Ext/abd/IR/ER  5\"x10        IR Towel S    next                            Other: PROM R shoulder flex/scap/IR/ER, E subscapularis DI     120 degrees AAROM shoulder flexion      Treatment Charges: Mins Units   []  Modalities     [x]  Ther Exercise 35 2   [x]  Manual Therapy 10 1   []  Ther Activities     []  Aquatics     []  Vasocompression     []  Other     Total Treatment time 45 3       Assessment: [x] Progressing toward goals. Patient warmed up the tissue completing AAROM using the pulley system. Patient demonstrating range as expected post-operatively. She reports fatigue with active R elbow motion, but no pain. Patient has stiffness at end ranges and tightness noted to the R subscapularis muscle. [] No change. [] Other:  [x] Patient would continue to benefit from skilled physical therapy services in order to: improve R shoulder ROM and strength to ease ADL's including reaching OH     STG/LTG  STG: (to be met in 10 treatments)  1. ? Pain: Pt to decrease pain levels to 2/10 with OH motion   2. ? ROM: Increase flexibility throughout to ease ADL progression  3. ? Strength: Patient to improve RUE strength to 5/5 to normalize shoulder elevation and prepare for lifting when restrictions allow   4. Independent with Home Exercise Programs     LTG: (to be met in 20 treatments)  1. 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 Patient to report 0/10 pain after completing all ADL's including work and recreational activities. Pt. Education:  [x] Yes  [] No  [] Reviewed Prior HEP/Ed  Educated on technique to complete charted exercises. Patient has no questions regarding her prior HEP. Method of Education: [x] Verbal  [x] Demo  [] Written  Comprehension of Education:  [x] Verbalizes understanding.   [x] Demonstrates understanding. [] Needs review. [] Demonstrates/verbalizes HEP/Ed previously given. Plan: [x] Continue current frequency toward long and short term goals.     [x] Specific Instructions for subsequent treatments: Progress per protocol       Time In:1400            Time Out:1450    Electronically signed by:  ABBY Chatman   This Documentation has been reviewed by [address], PT

## 2021-03-08 ENCOUNTER — HOSPITAL ENCOUNTER (OUTPATIENT)
Dept: PHYSICAL THERAPY | Facility: CLINIC | Age: 51
Setting detail: THERAPIES SERIES
Discharge: HOME | End: 2021-03-08
Payer: COMMERCIAL

## 2021-03-08 PROCEDURE — 97110 THERAPEUTIC EXERCISES: CPT

## 2021-03-08 PROCEDURE — 97140 MANUAL THERAPY 1/> REGIONS: CPT

## 2021-03-08 NOTE — FLOWSHEET NOTE
[x] THE [facility] &  Therapy  [facility]   [place]: [phone]  F: [phone]      Physical Therapy Daily Treatment Note    Date:  3/8/2021  Patient Name:  Esther Collazo    :  1970  MRN: [id number]  Physician: Dr. Janie Ruth: Jovana Suárez Diagnosis: R shoulder cuff repair               Rehab Codes: Z98.890, M62.81 (Muscle Weakness), M62.9 (Disorder of Muscle), M79.1 (Myalgia), Z73.6 (Limitation of ADLs)   Onset Date: 21                 Next 's appt. : 21     Visit# / total visits: 3/14     Cancels/No Shows: 0    Subjective:    Pain:  [x] Yes  [] No Location: R shoulder   Pain Rating: (0-10 scale) 3/10  Pain altered Tx:  [x] No  [] Yes  Action:   Comments: Patient arrived noting minor pain and stiffness. Objective:    Exercises:  Per protocol: PROM R ER to 45 in scapular plane, shoulder flexion 90   Exercise     R RTC repair with biceps tenodesis    DOS: 2021 Reps/ Time Weight/ Level Comments             Pulleys (scaption)  3'                   Supine         *wand flex  5\"x10    to tolerance    *wand ER  5\"x10    to tolerance     wand scaption 5\"x10                 Seated          *Scap retraction  x20       Scapular depression x20       *Upper trap S  3x30\"       Elbow flex/ext  x15   Prevention shoulder elevation    *Table Slides (flex)  x10                           Corner S  3x30\"       Isometrics   Ext/abd/IR/ER  5\"x10       IR Towel S   3x30\"                           Other: PROM R shoulder flex/scap/IR/ER, E subscapularis DI     120 degrees AAROM shoulder flexion    Treatment Charges: Mins Units   []  Modalities     [x]  Ther Exercise 30 2   [x]  Manual Therapy 10 1   []  Ther Activities     []  Aquatics     []  Vasocompression     []  Other     Total Treatment time 40 3       Assessment: [x] Progressing toward goals.  Continued with exercises per log, patient showing improved

## 2021-03-15 ENCOUNTER — HOSPITAL ENCOUNTER (OUTPATIENT)
Dept: PHYSICAL THERAPY | Facility: CLINIC | Age: 51
Setting detail: THERAPIES SERIES
Discharge: HOME | End: 2021-03-15
Payer: COMMERCIAL

## 2021-03-15 PROCEDURE — 97110 THERAPEUTIC EXERCISES: CPT

## 2021-03-15 PROCEDURE — 97140 MANUAL THERAPY 1/> REGIONS: CPT

## 2021-03-15 NOTE — FLOWSHEET NOTE
protocol, patient notes increased fatigue with no pain. Continued ROM progressions with no pain noted just tightness. Will continue progressions per protocol. [] No change. [] Other:  [x] Patient would continue to benefit from skilled physical therapy services in order to: improve R shoulder ROM and strength to ease ADL's including reaching OH     STG: (to be met in 10 treatments)  1. ? Pain: Pt to decrease pain levels to 2/10 with OH motion   2. ? ROM: Increase flexibility throughout to ease ADL progression  3. ? Strength: Patient to improve RUE strength to 5/5 to normalize shoulder elevation and prepare for lifting when restrictions allow   4. Independent with Home Exercise Programs     LTG: (to be met in 20 treatments)  1. 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 Patient to report 0/10 pain after completing all ADL's including work and recreational activities. Pt. Education:  [x] Yes  [] No  [] Reviewed Prior HEP/Ed  Educated on technique to complete charted exercises. Patient has no questions regarding her prior HEP. Method of Education: [x] Verbal  [x] Demo  [] Written  Comprehension of Education:  [x] Verbalizes understanding. [x] Demonstrates understanding. [] Needs review. [] Demonstrates/verbalizes HEP/Ed previously given. Plan: [x] Continue current frequency toward long and short term goals.     [x] Specific Instructions for subsequent treatments: Progress per protocol       Time In: 1400              Time Out: 2166    Electronically signed by:  Cesar Miranda PTA

## 2021-03-22 ENCOUNTER — HOSPITAL ENCOUNTER (OUTPATIENT)
Dept: PHYSICAL THERAPY | Facility: CLINIC | Age: 51
Setting detail: THERAPIES SERIES
Discharge: HOME | End: 2021-03-22
Payer: COMMERCIAL

## 2021-03-22 PROCEDURE — 97140 MANUAL THERAPY 1/> REGIONS: CPT

## 2021-03-22 PROCEDURE — 97110 THERAPEUTIC EXERCISES: CPT

## 2021-03-22 NOTE — FLOWSHEET NOTE
[x] THE [facility] &  Therapy  [facility]   [place]: [phone]  F: [phone]      Physical Therapy Daily Treatment Note    Date:  3/22/2021  Patient Name:  Ruben Carrero    :  1970  MRN: [id number]  Physician: Dr. Zandra Up: Estrella Burdick Diagnosis: R shoulder cuff repair               Rehab Codes: Z98.890, M62.81 (Muscle Weakness), M62.9 (Disorder of Muscle), M79.1 (Myalgia), Z73.6 (Limitation of ADLs)   Onset Date: 21                 Next 's appt. : 21     Visit# / total visits:      Cancels/No Shows: 0    Subjective:    Pain:  [x] Yes  [] No Location: R shoulder   Pain Rating: (0-10 scale) 0/10  Pain altered Tx:  [x] No  [] Yes  Action:   Comments: Patient arrived noting continued improvements with minor pain. Objective:    Exercises:    Per protocol: 11 weeks post-op as on 3/23/21   Scapular stabilization, AROM as tolerated  Exercise     R RTC repair with biceps tenodesis    DOS: 2021 Reps/ Time Weight/ Level Comments             Pulleys   3'/3'                 Supine         Punches 2x10 3#    Alphabet 1x 3#    SL ER 2x10 3#    Supine alternating ER/IR at 90 degrees abd  x10      Sleeper Stretch  10x10\"           Standing        Wand flex/scap  x10   With cues on upright posture    Corner S 3x30\"       Tband Rows/Ext/IR/ER 2x10  Green     Wall Push-ups  x15           Ball on wall x20           Other: PROM R shoulder flex/scap/IR/ER, E subscapularis DI       Treatment Charges: Mins Units   []  Modalities     [x]  Ther Exercise 30 2   [x]  Manual Therapy 10 1   []  Ther Activities     []  Aquatics     []  Vasocompression     []  Other     Total Treatment time 40 3       Assessment: [x] Progressing toward goals. Progressed strength program per protocol, patient notes increased fatigue with minimal pain increase to the lateral shoulder.  Patient has most difficulty performed ER against resistance. Continued ROM progressions with no pain noted just tightness. Patient reports that this is the most ROM she has had since her surgery. [] No change. [] Other:  [x] Patient would continue to benefit from skilled physical therapy services in order to: improve R shoulder ROM and strength to ease ADL's including reaching OH     STG: (to be met in 10 treatments)  1. ? Pain: Pt to decrease pain levels to 2/10 with OH motion   2. ? ROM: Increase flexibility throughout to ease ADL progression  3. ? Strength: Patient to improve RUE strength to 5/5 to normalize shoulder elevation and prepare for lifting when restrictions allow   4. Independent with Home Exercise Programs     LTG: (to be met in 20 treatments)  1. 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 Patient to report 0/10 pain after completing all ADL's including work and recreational activities. Pt. Education:  [x] Yes  [] No  [] Reviewed Prior HEP/Ed  Educated on technique to complete charted exercises. Patient has no questions regarding her prior HEP. Added standing Tband resistance exercises and was provided with a print-out and green band. Patient demonstrates exercises in the clinic with minimal postural cues. Method of Education: [x] Verbal  [x] Demo  [] Written  Comprehension of Education:  [x] Verbalizes understanding. [x] Demonstrates understanding. [] Needs review. [] Demonstrates/verbalizes HEP/Ed previously given. Plan: [x] Continue current frequency toward long and short term goals.     [x] Specific Instructions for subsequent treatments: Progress per protocol, prone bench exercises        Time In: 1000              Time Out: 1050    Electronically signed by:  ABBY Chambers   This Documentation has been reviewed by [address], PT

## 2021-03-29 ENCOUNTER — HOSPITAL ENCOUNTER (OUTPATIENT)
Dept: PHYSICAL THERAPY | Facility: CLINIC | Age: 51
Setting detail: THERAPIES SERIES
Discharge: HOME | End: 2021-03-29
Payer: COMMERCIAL

## 2021-03-29 PROCEDURE — 97140 MANUAL THERAPY 1/> REGIONS: CPT

## 2021-03-29 PROCEDURE — 97110 THERAPEUTIC EXERCISES: CPT

## 2021-03-29 NOTE — FLOWSHEET NOTE
[x] THE [facility] &  Therapy  [facility]   [place]: [phone]  F: [phone]      Physical Therapy Daily Treatment Note    Date:  3/29/2021  Patient Name:  David Mccrary    :  1970  MRN: [id number]  Physician: Dr. Edie Rothman: Rosie Govea Diagnosis: R shoulder cuff repair               Rehab Codes: Z98.890, M62.81 (Muscle Weakness), M62.9 (Disorder of Muscle), M79.1 (Myalgia), Z73.6 (Limitation of ADLs)   Onset Date: 21                 Next 's appt. : 21     Visit# / total visits:      Cancels/No Shows: 0    Subjective:    Pain:  [x] Yes  [] No Location: R shoulder   Pain Rating: (0-10 scale) 0/10  Pain altered Tx:  [x] No  [] Yes  Action:   Comments: Patient without pain complaints upon entrance. She reports that she woke up with R mid-back stiffness that has been slightly improving, but still noticeable.      Objective:    Exercises:    Per protocol: 11 weeks post-op as of 3/23/21   Scapular stabilization, AROM as tolerated  Exercise     R RTC repair with biceps tenodesis    DOS: 2021 Reps/ Time Weight/ Level Comments               Pulleys   3'/3'     x              Supine          Punches 2x10 3#     Alphabet 1x 3#     SL ER 2x10 3#     Supine alternating ER/IR at 90 degrees abd  x10       Sleeper Stretch  10x10\"             Standing         Wand flex/scap  x10   With cues on upright posture  x   Corner S 3x30\"     x   Tband Rows/Ext/IR/ER 2x10  Green   x   Wall Push-ups  2x10   x          Ball on wall 30\"   x          Prone        Rows x10  bench x   Ext x10  bench x   Horizontal ABD  x10  bench x   Scapular Retract/depress x10 each   x   Other: PROM R shoulder flex/scap/IR/ER    Foam Roll to the thoracic region    Thoracic dysfunction: FRSR MOB T4 and T6 levels       Treatment Charges: Mins Units   []  Modalities     [x]  Ther Exercise 45 3   [x]  Manual Therapy 10 1   []  Ther Activities     []  Aquatics     []  Vasocompression     []  Other     Total Treatment time 55 4       Assessment: [x] Progressing toward goals. Progressed strength program per protocol, patient notes increased fatigue with minimal pain increase to the lateral shoulder and L biceps region. Initiated prone program with cues provided to improve scapular retraction motion and preventing upper trapezius activation. Continued ROM progressions with no pain noted just tightness. [] No change. [] Other:  [x] Patient would continue to benefit from skilled physical therapy services in order to: improve R shoulder ROM and strength to ease ADL's including reaching OH     STG: (to be met in 10 treatments)  1. ? Pain: Pt to decrease pain levels to 2/10 with OH motion   2. ? ROM: Increase flexibility throughout to ease ADL progression  3. ? Strength: Patient to improve RUE strength to 5/5 to normalize shoulder elevation and prepare for lifting when restrictions allow   4. Independent with Home Exercise Programs     LTG: (to be met in 20 treatments)  1. 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 Patient to report 0/10 pain after completing all ADL's including work and recreational activities. Pt. Education:  [x] Yes  [] No  [] Reviewed Prior HEP/Ed  Discussed forward head posture and protracted shoulder as a cause of decreased shoulder mobility. Patient demonstrates increase shoulder flexion when tactile assist provided at the mid back to maintain upright posture. Patient encouraged to be aware of her posture throughout the day, especially when working and lifting overhead. Taught patient use of the foam roller and mid back stretch. Method of Education: [x] Verbal  [x] Demo  [] Written  Comprehension of Education:  [x] Verbalizes understanding. [x] Demonstrates understanding. [] Needs review.   [] Demonstrates/verbalizes HEP/Ed previously given.     Plan: [x] Continue current frequency toward long and short term goals.     [x] Specific Instructions for subsequent treatments: Progress per protocol, prone bench exercises        Time In: 1400              Time Out: 0242    Electronically signed by:  ABBY Velazquez   This Documentation has been reviewed by [address], PT

## 2021-04-06 ENCOUNTER — HOSPITAL ENCOUNTER (OUTPATIENT)
Dept: PHYSICAL THERAPY | Facility: CLINIC | Age: 51
Setting detail: THERAPIES SERIES
Discharge: HOME | End: 2021-04-06
Payer: COMMERCIAL

## 2021-04-06 PROCEDURE — 97110 THERAPEUTIC EXERCISES: CPT

## 2021-04-06 NOTE — FLOWSHEET NOTE
[x] THE [facility] &  Therapy  [facility]   [place]: [phone]  F: [phone]      Physical Therapy Daily Treatment Note    Date:  2021  Patient Name:  Robert Martin    :  1970  MRN: [id number]  Physician: Dr. Artie Trotter: Elizabeth Kraft Diagnosis: R shoulder cuff repair               Rehab Codes: Z98.890, M62.81 (Muscle Weakness), M62.9 (Disorder of Muscle), M79.1 (Myalgia), Z73.6 (Limitation of ADLs)   Onset Date: 21                 Next 's appt. : 21     Visit# / total visits:      Cancels/No Shows: 0    Subjective:    Pain:  [x] Yes  [] No Location: R shoulder   Pain Rating: (0-10 scale) 0/10  Pain altered Tx:  [x] No  [] Yes  Action:   Comments: Patient rates no pain noting continued tightness in R UT. Pt reports she feels like her shoulder is frozen again and keeps pushing her ROM but she is scared she will tear something. Objective:    Exercises:    Per protocol: 11 weeks post-op as of 3/23/21   Scapular stabilization, AROM as tolerated  Exercise     R RTC repair with biceps tenodesis    DOS: 2021 Reps/ Time Weight/ Level Comments             Pulleys   3'/3'                 ITY x10     Corner S 3x30\"       Wall Push-ups  2x10     Wall slides 10x10\" Flex/abd    Ball on wall 20x CW/CCW          TBand extension 2x10 Blue    TBand rows 2x10 Blue    TBand IR 2x10 Blue    TBand ER+abd x10 Active          Bench       Rows x10     Ext x10     Horizontal ABD  x10           Other: PROM R shoulder flex/scap/IR/ER             MFR R UT via hypervolt       Treatment Charges: Mins Units   []  Modalities     [x]  Ther Exercise 45 3   []  Manual Therapy     []  Ther Activities     []  Aquatics     []  Vasocompression     []  Other     Total Treatment time 45 3       Assessment: [x] Progressing toward goals.  Continued with program with addition of standing active UE exercises, pt with quick fatigue requiring frequent rest breaks. Will continue to progress shoulder strength next visit. [] No change. [] Other:  [x] Patient would continue to benefit from skilled physical therapy services in order to: improve R shoulder ROM and strength to ease ADL's including reaching OH     STG: (to be met in 10 treatments)  1. ? Pain: Pt to decrease pain levels to 2/10 with OH motion   2. ? ROM: Increase flexibility throughout to ease ADL progression  3. ? Strength: Patient to improve RUE strength to 5/5 to normalize shoulder elevation and prepare for lifting when restrictions allow   4. Independent with Home Exercise Programs     LTG: (to be met in 20 treatments)  1. 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 Patient to report 0/10 pain after completing all ADL's including work and recreational activities. Pt. Education:  [x] Yes  [] No  [] Reviewed Prior HEP/Ed  Discussed forward head posture and protracted shoulder as a cause of decreased shoulder mobility. Patient demonstrates increase shoulder flexion when tactile assist provided at the mid back to maintain upright posture. Patient encouraged to be aware of her posture throughout the day, especially when working and lifting overhead. Taught patient use of the foam roller and mid back stretch. Method of Education: [x] Verbal  [x] Demo  [] Written  Comprehension of Education:  [x] Verbalizes understanding. [x] Demonstrates understanding. [] Needs review. [] Demonstrates/verbalizes HEP/Ed previously given. Plan: [x] Continue current frequency toward long and short term goals.     [x] Specific Instructions for subsequent treatments: Progress per protocol, prone bench exercises        Time In: 10:00am               Time Out: 10:55am    Electronically signed by:  Eric Covarrubias PTA

## 2021-04-07 ENCOUNTER — OFFICE VISIT (OUTPATIENT)
Dept: ORTHOPEDIC SURGERY | Age: 51
End: 2021-04-07

## 2021-04-07 VITALS — BODY MASS INDEX: 33.74 KG/M2 | HEIGHT: 67 IN | WEIGHT: 215 LBS

## 2021-04-07 DIAGNOSIS — Z09 POSTOPERATIVE EXAMINATION: Primary | ICD-10-CM

## 2021-04-07 PROCEDURE — 99024 POSTOP FOLLOW-UP VISIT: CPT | Performed by: ORTHOPAEDIC SURGERY

## 2021-04-09 NOTE — PROGRESS NOTES
Procedure: Right shoulder arthroscopic rotator cuff repair and biceps tenodesis  Date of procedure: 1/5/21    HPI:  Robert Martin is a 44 y.o. female who is approximately 3 months status post aforementioned procedure. She states that she is doing well at this time. She has been going to physical therapy and they have been taking things slow as instructed and only recently started working on some strengthening. She states that it does ache a little while after therapy but then goes away. When it does ache she rates that pain as a 4/10. She states that therapy has been going well for her. She still feels weak in this arm but has already stated just started strengthening and so she is anticipating improvement. She does indicate that her work has been going well for her and she is having no issues there. Physical examination:  Evaluation of patient's right shoulder and upper extremity demonstrates her incisions to be healed. There is no warmth, erythema, wound dehiscence or drainage. Sensation is grossly intact light touch in all dermatomes and she has a 2+ radial pulse with brisk capillary refill in her fingers. She can actively elevate with some assistance up to 125 degrees, abduct 120 degrees, externally rotate to 50 degrees and internally rotate to L4. She has 5/5 muscle strength with resisted deltoid, supraspinatus, external rotation internal rotation testing. Impression and plan:  Robert Martin is a 44 y.o. female who is approximately 3 months status post an arthroscopic right shoulder rotator cuff repair and biceps tenodesis. She is doing relatively well at this time making progress in terms of her range of motion. I encouraged her at this point to continue on with physical therapy and her home exercise program as anticipate continued improvement over time and appropriate rehabilitation. She can continue to use this arm for light activities of daily living including her job.   I will see her back for reevaluation in 3 months but she was encouraged to return or call earlier with questions under concerns. Yes - the patient is able to be screened

## 2021-04-12 ENCOUNTER — HOSPITAL ENCOUNTER (OUTPATIENT)
Dept: PHYSICAL THERAPY | Facility: CLINIC | Age: 51
Setting detail: THERAPIES SERIES
Discharge: HOME | End: 2021-04-12
Payer: COMMERCIAL

## 2021-04-12 PROCEDURE — 97110 THERAPEUTIC EXERCISES: CPT

## 2021-04-12 NOTE — FLOWSHEET NOTE
[x] THE [facility] &  Therapy  [facility]   [place]: [phone]  F: [phone]      Physical Therapy Daily Treatment Note    Date:  2021  Patient Name:  Rakan Moore    :  1970  MRN: [id number]  Physician: Dr. Paddy Mijares: Alyssa Johnson Diagnosis: R shoulder cuff repair               Rehab Codes: Z98.890, M62.81 (Muscle Weakness), M62.9 (Disorder of Muscle), M79.1 (Myalgia), Z73.6 (Limitation of ADLs)   Onset Date: 21                 Next 's appt. : 21     Visit# / total visits:      Cancels/No Shows: 0    Subjective:    Pain:  [] Yes  [x] No Location: R shoulder   Pain Rating: (0-10 scale) 0/10  Pain altered Tx:  [x] No  [] Yes  Action:   Comments:     Objective:    Exercises:    Per protocol: 11 weeks post-op as of 3/23/21   Scapular stabilization, AROM as tolerated  Exercise     R RTC repair with biceps tenodesis    DOS: 2021 Reps/ Time Weight/ Level Comments             Pulleys   3'/3'                 ITY 2x10 1#    Corner S 3x30\"       Wall Push-ups  2x10     Wall slides 10x10\" Flex/abd    Ball on wall 20x CW/CCW          TBand extension 2x10 Blue    TBand rows 2x10 Blue    TBand IR 2x10 Blue    TBand ER+abd 2x10 Green    TBand Bicep  2x10 Blue    TBand Tricep 2x10 Blue    TBand Lat 2x10 Blue    TBand walk outs 2x10 Blue          Bench       Rows x10     Ext x10     Horizontal ABD  x10           Other: PROM R shoulder flex/scap/IR/ER             MFR R UT via hypervolt       Treatment Charges: Mins Units   []  Modalities     [x]  Ther Exercise 45 3   []  Manual Therapy     []  Ther Activities     []  Aquatics     []  Vasocompression     []  Other     Total Treatment time 45 3       Assessment: [x] Progressing toward goals. Progressed strength program this visit. Fatigue noted with no complaint of pain. Improved PROM noted this date. [] No change.      [] Other:  [x] Patient would continue to benefit from skilled physical therapy services in order to: improve R shoulder ROM and strength to ease ADL's including reaching OH     STG: (to be met in 10 treatments)  1. ? Pain: Pt to decrease pain levels to 2/10 with OH motion   2. ? ROM: Increase flexibility throughout to ease ADL progression  3. ? Strength: Patient to improve RUE strength to 5/5 to normalize shoulder elevation and prepare for lifting when restrictions allow   4. Independent with Home Exercise Programs     LTG: (to be met in 20 treatments)  1. 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 Patient to report 0/10 pain after completing all ADL's including work and recreational activities. Pt. Education:  [x] Yes  [] No  [] Reviewed Prior HEP/Ed  Discussed forward head posture and protracted shoulder as a cause of decreased shoulder mobility. Patient demonstrates increase shoulder flexion when tactile assist provided at the mid back to maintain upright posture. Patient encouraged to be aware of her posture throughout the day, especially when working and lifting overhead. Taught patient use of the foam roller and mid back stretch. Method of Education: [x] Verbal  [x] Demo  [] Written  Comprehension of Education:  [x] Verbalizes understanding. [x] Demonstrates understanding. [] Needs review. [] Demonstrates/verbalizes HEP/Ed previously given. Plan: [x] Continue current frequency toward long and short term goals.     [x] Specific Instructions for subsequent treatments: Progress per protocol, prone bench exercises        Time In: 1400               Time Out: 1447    Electronically signed by:  Beau Koenig PTA

## 2021-04-18 RX ORDER — LEVOTHYROXINE SODIUM 0.15 MG/1
TABLET ORAL
Qty: 30 TABLET | Refills: 5 | Status: SHIPPED | OUTPATIENT
Start: 2021-04-18 | End: 2021-10-01

## 2021-04-19 ENCOUNTER — HOSPITAL ENCOUNTER (OUTPATIENT)
Dept: PHYSICAL THERAPY | Facility: CLINIC | Age: 51
Setting detail: THERAPIES SERIES
Discharge: HOME | End: 2021-04-19
Payer: COMMERCIAL

## 2021-04-19 PROCEDURE — 97110 THERAPEUTIC EXERCISES: CPT

## 2021-04-19 NOTE — FLOWSHEET NOTE
[x] THE [facility] &  Therapy  [facility]   [place]: [phone]  F: [phone]      Physical Therapy Daily Treatment Note    Date:  2021  Patient Name:  Rakan Moore    :  1970  MRN: [id number]  Physician: Dr. Paddy Mijares: Alyssa Johnson Diagnosis: R shoulder cuff repair               Rehab Codes: Z98.890, M62.81 (Muscle Weakness), M62.9 (Disorder of Muscle), M79.1 (Myalgia), Z73.6 (Limitation of ADLs)   Onset Date: 21                 Next 's appt. : 21     Visit# / total visits:      Cancels/No Shows: 0    Subjective:    Pain:  [] Yes  [x] No Location: R shoulder   Pain Rating: (0-10 scale) 0/10  Pain altered Tx:  [x] No  [] Yes  Action:   Comments: Patient arrived noting continued improvements in strength and ROM. Objective:    Exercises:    Per protocol: 11 weeks post-op as of 3/23/21   Scapular stabilization, AROM as tolerated  Exercise     R RTC repair with biceps tenodesis    DOS: 2021 Reps/ Time Weight/ Level Comments             Pulleys   3'/3'                 ITY 2x10 1#    Corner S 3x30\"       Wall Push-ups  2x10     Wall slides 10x10\" Flex/abd    Ball on wall 20x CW/CCW          TBand extension 2x10 Purple    TBand rows 2x10 Purple    TBand IR 2x10 Blue    TBand ER+abd 2x10 Blue    TBand Bicep  2x10 Purple    TBand Tricep 2x10 Purple    TBand Lat 2x10 Purple    TBand walk outs 2x10 Blue          Wall foam roll 2x10 Orange     90 flexion 2x10 Orange          Bench       Rows 2x10 1#    Ext 2x10 1#    Horizontal ABD  2x10 1#          Other: PROM R shoulder flex/scap/IR/ER             MFR R UT via hypervolt       Treatment Charges: Mins Units   []  Modalities     [x]  Ther Exercise 45 3   []  Manual Therapy     []  Ther Activities     []  Aquatics     []  Vasocompression     []  Other     Total Treatment time 45 3       Assessment: [x] Progressing toward goals.  Continued progressions this date, soreness noted at end ranges. [] No change. [] Other:  [x] Patient would continue to benefit from skilled physical therapy services in order to: improve R shoulder ROM and strength to ease ADL's including reaching OH     STG: (to be met in 10 treatments)  1. ? Pain: Pt to decrease pain levels to 2/10 with OH motion   2. ? ROM: Increase flexibility throughout to ease ADL progression  3. ? Strength: Patient to improve RUE strength to 5/5 to normalize shoulder elevation and prepare for lifting when restrictions allow   4. Independent with Home Exercise Programs     LTG: (to be met in 20 treatments)  1. 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 Patient to report 0/10 pain after completing all ADL's including work and recreational activities. Pt. Education:  [x] Yes  [] No  [] Reviewed Prior HEP/Ed  Discussed forward head posture and protracted shoulder as a cause of decreased shoulder mobility. Patient demonstrates increase shoulder flexion when tactile assist provided at the mid back to maintain upright posture. Patient encouraged to be aware of her posture throughout the day, especially when working and lifting overhead. Taught patient use of the foam roller and mid back stretch. Method of Education: [x] Verbal  [x] Demo  [] Written  Comprehension of Education:  [x] Verbalizes understanding. [x] Demonstrates understanding. [] Needs review. [] Demonstrates/verbalizes HEP/Ed previously given. Plan: [x] Continue current frequency toward long and short term goals.     [x] Specific Instructions for subsequent treatments: Progress per protocol, prone bench exercises        Time In: 1400               Time Out: 1450    Electronically signed by:  Elyse Hu PTA

## 2021-04-23 ENCOUNTER — HOSPITAL ENCOUNTER (OUTPATIENT)
Dept: PHYSICAL THERAPY | Facility: CLINIC | Age: 51
Setting detail: THERAPIES SERIES
Discharge: HOME | End: 2021-04-23
Payer: COMMERCIAL

## 2021-04-23 PROCEDURE — 97110 THERAPEUTIC EXERCISES: CPT

## 2021-05-23 ENCOUNTER — APPOINTMENT (OUTPATIENT)
Dept: CT IMAGING | Facility: CLINIC | Age: 51
End: 2021-05-23
Payer: COMMERCIAL

## 2021-05-23 ENCOUNTER — HOSPITAL ENCOUNTER (EMERGENCY)
Facility: CLINIC | Age: 51
Discharge: HOME | End: 2021-05-23
Attending: EMERGENCY MEDICINE
Payer: COMMERCIAL

## 2021-05-23 VITALS
WEIGHT: 210 LBS | HEIGHT: 67 IN | TEMPERATURE: 98.2 F | BODY MASS INDEX: 32.96 KG/M2 | HEART RATE: 74 BPM | SYSTOLIC BLOOD PRESSURE: 125 MMHG | RESPIRATION RATE: 15 BRPM | DIASTOLIC BLOOD PRESSURE: 78 MMHG | OXYGEN SATURATION: 98 %

## 2021-05-23 DIAGNOSIS — H81.10 BENIGN PAROXYSMAL POSITIONAL VERTIGO, UNSPECIFIED LATERALITY: Primary | ICD-10-CM

## 2021-05-23 LAB
ABSOLUTE EOS #: 0 K/UL (ref 0–0.4)
ABSOLUTE IMMATURE GRANULOCYTE: ABNORMAL K/UL (ref 0–0.3)
ABSOLUTE LYMPH #: 0.9 K/UL (ref 1–4.8)
ABSOLUTE MONO #: 0.3 K/UL (ref 0.1–1.2)
ANION GAP SERPL CALCULATED.3IONS-SCNC: 12 MMOL/L (ref 9–17)
BASOPHILS # BLD: 1 % (ref 0–2)
BASOPHILS ABSOLUTE: 0 K/UL (ref 0–0.2)
BUN BLDV-MCNC: 12 MG/DL (ref 6–20)
BUN/CREAT BLD: ABNORMAL (ref 9–20)
CALCIUM SERPL-MCNC: 8.9 MG/DL (ref 8.6–10.4)
CHLORIDE BLD-SCNC: 103 MMOL/L (ref 98–107)
CO2: 21 MMOL/L (ref 20–31)
CREAT SERPL-MCNC: 0.6 MG/DL (ref 0.5–0.9)
DIFFERENTIAL TYPE: ABNORMAL
EOSINOPHILS RELATIVE PERCENT: 0 % (ref 1–4)
GFR AFRICAN AMERICAN: >60 ML/MIN
GFR NON-AFRICAN AMERICAN: >60 ML/MIN
GFR SERPL CREATININE-BSD FRML MDRD: ABNORMAL ML/MIN/{1.73_M2}
GFR SERPL CREATININE-BSD FRML MDRD: ABNORMAL ML/MIN/{1.73_M2}
GLUCOSE BLD-MCNC: 107 MG/DL (ref 70–99)
HCT VFR BLD CALC: 36.9 % (ref 36–46)
HEMOGLOBIN: 12.1 G/DL (ref 12–16)
IMMATURE GRANULOCYTES: ABNORMAL %
LYMPHOCYTES # BLD: 13 % (ref 24–44)
MAGNESIUM: 1.9 MG/DL (ref 1.6–2.6)
MCH RBC QN AUTO: 30.1 PG (ref 26–34)
MCHC RBC AUTO-ENTMCNC: 32.9 G/DL (ref 31–37)
MCV RBC AUTO: 91.5 FL (ref 80–100)
MONOCYTES # BLD: 4 % (ref 2–11)
NRBC AUTOMATED: ABNORMAL PER 100 WBC
PDW BLD-RTO: 12.1 % (ref 12.5–15.4)
PLATELET # BLD: 291 K/UL (ref 140–450)
PLATELET ESTIMATE: ABNORMAL
PMV BLD AUTO: 7.5 FL (ref 6–12)
POTASSIUM SERPL-SCNC: 3.7 MMOL/L (ref 3.7–5.3)
RBC # BLD: 4.04 M/UL (ref 4–5.2)
RBC # BLD: ABNORMAL 10*6/UL
SEG NEUTROPHILS: 82 % (ref 36–66)
SEGMENTED NEUTROPHILS ABSOLUTE COUNT: 6.2 K/UL (ref 1.8–7.7)
SODIUM BLD-SCNC: 136 MMOL/L (ref 135–144)
TROPONIN INTERP: NORMAL
TROPONIN T: NORMAL NG/ML
TROPONIN, HIGH SENSITIVITY: <6 NG/L (ref 0–14)
WBC # BLD: 7.5 K/UL (ref 3.5–11)
WBC # BLD: ABNORMAL 10*3/UL

## 2021-05-23 PROCEDURE — 83735 ASSAY OF MAGNESIUM: CPT

## 2021-05-23 PROCEDURE — 6360000002 HC RX W HCPCS: Performed by: EMERGENCY MEDICINE

## 2021-05-23 PROCEDURE — 80048 BASIC METABOLIC PNL TOTAL CA: CPT

## 2021-05-23 PROCEDURE — 99284 EMERGENCY DEPT VISIT MOD MDM: CPT

## 2021-05-23 PROCEDURE — 84484 ASSAY OF TROPONIN QUANT: CPT

## 2021-05-23 PROCEDURE — 93005 ELECTROCARDIOGRAM TRACING: CPT | Performed by: EMERGENCY MEDICINE

## 2021-05-23 PROCEDURE — 70450 CT HEAD/BRAIN W/O DYE: CPT

## 2021-05-23 PROCEDURE — 6370000000 HC RX 637 (ALT 250 FOR IP): Performed by: EMERGENCY MEDICINE

## 2021-05-23 PROCEDURE — 36415 COLL VENOUS BLD VENIPUNCTURE: CPT

## 2021-05-23 PROCEDURE — 96374 THER/PROPH/DIAG INJ IV PUSH: CPT

## 2021-05-23 PROCEDURE — 85025 COMPLETE CBC W/AUTO DIFF WBC: CPT

## 2021-05-23 RX ORDER — MECLIZINE HCL 12.5 MG/1
25 TABLET ORAL ONCE
Status: COMPLETED | OUTPATIENT
Start: 2021-05-23 | End: 2021-05-23

## 2021-05-23 RX ORDER — LORAZEPAM 1 MG/1
1 TABLET ORAL NIGHTLY
Qty: 5 TABLET | Refills: 0 | Status: SHIPPED | OUTPATIENT
Start: 2021-05-23 | End: 2021-05-28

## 2021-05-23 RX ORDER — LORAZEPAM 2 MG/ML
0.5 INJECTION INTRAMUSCULAR ONCE
Status: COMPLETED | OUTPATIENT
Start: 2021-05-23 | End: 2021-05-23

## 2021-05-23 RX ORDER — MECLIZINE HYDROCHLORIDE 25 MG/1
25 TABLET ORAL 3 TIMES DAILY PRN
Qty: 30 TABLET | Refills: 0 | Status: SHIPPED | OUTPATIENT
Start: 2021-05-23 | End: 2021-06-02

## 2021-05-23 RX ADMIN — LORAZEPAM 0.5 MG: 2 INJECTION INTRAMUSCULAR; INTRAVENOUS at 15:20

## 2021-05-23 RX ADMIN — MECLIZINE 25 MG: 12.5 TABLET ORAL at 15:21

## 2021-05-23 ASSESSMENT — ENCOUNTER SYMPTOMS
SHORTNESS OF BREATH: 0
ABDOMINAL PAIN: 0

## 2021-05-23 NOTE — ED PROVIDER NOTES
[address] ED  EMERGENCY DEPARTMENT ENCOUNTER      Pt Name: Suraj Dia  MRN: [id number]  [place] 1970  Date of evaluation: 5/23/2021  Provider: Shawnee Lorenzana MD    CHIEF COMPLAINT   No chief complaint on file. HISTORY OF PRESENT ILLNESS   (Location/Symptom, Timing/Onset, Context/Setting,Quality, Duration, Modifying Factors, Severity)  Note limiting factors. Suraj Dia is a 46 y.o. female who presents to the emergency department stating shortly after she woke up this morning she started experiencing dizziness that was worse with change in position and is accompanied with a very slight headache. She reports nausea but no vomiting. She denies tinnitus or ear pain or visual deficit. No other neurologic symptoms are reported. Patient states she is perimenopausal and denies any possibility of pregnancy. The history is provided by the patient. Nursing Notes werereviewed. REVIEW OF SYSTEMS    (2-9 systems for level 4, 10 or more for level 5)     Review of Systems   Constitutional: Negative for chills and fever. Respiratory: Negative for shortness of breath. Cardiovascular: Negative for chest pain. Gastrointestinal: Negative for abdominal pain. All other systems reviewed and are negative. Except as noted above the remainder of the review of systems was reviewed and negative.        PAST MEDICAL HISTORY     Past Medical History:   Diagnosis Date    Abnormal Pap smear of cervix     COVID-19 12/2020    GERD (gastroesophageal reflux disease)     Hypothyroidism     PONV (postoperative nausea and vomiting)          SURGICALHISTORY       Past Surgical History:   Procedure Laterality Date    CHOLECYSTECTOMY  05/2019    HUMERUS FRACTURE SURGERY Right 5/5/2020    HUMERUS OPEN REDUCTION INTERNAL FIXATION performed by Daisy Rudolph MD at [address] [place] [address]      x    SHOULDER ARTHROSCOPY Right 09/22/2020    SHOULDER ARTHROSCOPY - CAPSULAR RELEASE WITH MANIPULATION     SHOULDER ARTHROSCOPY Right 2020    SHOULDER ARTHROSCOPY - CAPSULAR RELEASE WITH MANIPULATION UNDER ANESTHESIA AND PRE OP INTERSCALENE BLOC performed by Dinora Watson MD at [facility] ARTHROSCOPY Right 2021    ROTATOR CUFF REPAIR WITH ARTHREX, DERMAL ALLOGRAFT, ATTEMPTED BONE MARROW ASPIRATE, AND POSSIBLE SUPERIOR CAPSULAR RECONSTRUCTION     SHOULDER ARTHROSCOPY Right 2021    SHOULDER ARTHROSCOPY, ROTATOR CUFF REPAIR WITH ARTHREX, ATTEMPTED BONE MARROW ASPIRATE, AND BICEPS TENODESIS performed by Dinora Watson MD at [address] [place] [address] Right 2020    WRIST OPEN REDUCTION INTERNAL FIXATION performed by Dinora Watson MD at [address] [place] [address]       Previous Medications    LEVOTHYROXINE (SYNTHROID) 150 MCG TABLET    TAKE ONE TABLET BY MOUTH DAILY    ONDANSETRON (ZOFRAN) 4 MG TABLET    Take 1 tablet by mouth daily as needed for Nausea or Vomiting    PRILOSEC OTC 20 MG TABLET    TAKE ONE TABLET BY MOUTH DAILY       ALLERGIES     Patient has no known allergies.     FAMILY HISTORY       Family History   Problem Relation Age of Onset   [employer] Cancer Mother         unknown    COPD Father     Kidney Disease Father     Colon Polyps Brother           SOCIAL HISTORY       Social History     Socioeconomic History    Marital status:      Spouse name: None    Number of children: 2    Years of education: None    Highest education level: None   Occupational History    Occupation:    Tobacco Use    Smoking status: Former Smoker     Quit date: 2010     Years since quittin.2    Smokeless tobacco: Never Used   Vaping Use    Vaping Use: Never used   Substance and Sexual Activity    Alcohol use: Yes     Comment: rarely    Drug use: No    Sexual activity: Yes     Partners: Male   Other Topics Concern    None   Social History Narrative    None     Social Determinants of Health Financial Resource Strain:     Difficulty of Paying Living Expenses:    Food Insecurity:     Worried About Running Out of Food in the Last Year:     [address] in the Last Year:    Transportation Needs:     Lack of Transportation (Medical):  Lack of Transportation (Non-Medical):    Physical Activity:     Days of Exercise per Week:     Minutes of Exercise per Session:    Stress:     Feeling of Stress :    Social Connections:     Frequency of Communication with Friends and Family:     Frequency of Social Gatherings with Friends and Family:     Attends [employer] Services:     Active Member of Clubs or Organizations:     Attends Club or Organization Meetings:     Marital Status:    Intimate Partner Violence:     Fear of Current or Ex-Partner:     Emotionally Abused:     Physically Abused:     Sexually Abused:        SCREENINGS             PHYSICAL EXAM    (up to 7 for level 4, 8 or more for level 5)     ED Triage Vitals   BP Temp Temp src Pulse Resp SpO2 Height Weight   -- -- -- -- -- -- -- --       Physical Exam  Vitals reviewed. Constitutional:       General: She is not in acute distress. Appearance: She is not ill-appearing. HENT:      Head: Normocephalic. Right Ear: External ear normal.      Left Ear: External ear normal.      Nose: Nose normal.   Eyes:      Extraocular Movements: Extraocular movements intact. Pupils: Pupils are equal, round, and reactive to light. Comments: 4-5 beat horizontal nystagmus bilaterally. Cardiovascular:      Rate and Rhythm: Normal rate and regular rhythm. Pulmonary:      Effort: Pulmonary effort is normal.      Breath sounds: Normal breath sounds. Abdominal:      Palpations: Abdomen is soft. Tenderness: There is no abdominal tenderness. Musculoskeletal:         General: No swelling or tenderness. Normal range of motion. Cervical back: Neck supple. Skin:     General: Skin is warm and dry.    Neurological: vertigo, unspecified laterality          DISPOSITION/PLAN   DISPOSITION Decision To Discharge 05/23/2021 02:45:04 PM      PATIENT REFERRED TO:  [email or web address] [address] [place] [address]  [phone]            DISCHARGE MEDICATIONS:         Problem List:  Patient Active Problem List   Diagnosis Code    Hypothyroidism due to Hashimoto's thyroiditis E03.8, E06.3    Closed fracture of right distal radius S52.501A    Closed displaced fracture of greater tuberosity of right humerus S42.251A    Complete rotator cuff tear or rupture of right shoulder, not specified as traumatic M75.121           Summation      Patient Course: Discharged. ED Medicationsadministered this visit:    Medications   meclizine (ANTIVERT) tablet 25 mg (has no administration in time range)   LORazepam (ATIVAN) injection 0.5 mg (has no administration in time range)       New Prescriptions from this visit:    New Prescriptions    LORAZEPAM (ATIVAN) 1 MG TABLET    Take 1 tablet by mouth nightly for 5 days. MECLIZINE (ANTIVERT) 25 MG TABLET    Take 1 tablet by mouth 3 times daily as needed for Dizziness or Nausea       Follow-up:  MD Luli Villalobos [address] [place] [address]  [phone]              Final Impression:   1.  Benign paroxysmal positional vertigo, unspecified laterality               (Please note that portions of this note were completed with a voice recognitionprogram.  Efforts were made to edit the dictations but occasionally words are mis-transcribed.)    Blaire Alvarado MD (electronically signed)  Attending Emergency Physician            Blaire Alvarado MD  05/23/21 [id number]

## 2021-05-25 LAB
EKG ATRIAL RATE: 73 BPM
EKG P AXIS: 11 DEGREES
EKG P-R INTERVAL: 168 MS
EKG Q-T INTERVAL: 386 MS
EKG QRS DURATION: 72 MS
EKG QTC CALCULATION (BAZETT): 425 MS
EKG R AXIS: 69 DEGREES
EKG T AXIS: 48 DEGREES
EKG VENTRICULAR RATE: 73 BPM

## 2021-07-14 ENCOUNTER — OFFICE VISIT (OUTPATIENT)
Dept: ORTHOPEDIC SURGERY | Age: 51
End: 2021-07-14
Payer: COMMERCIAL

## 2021-07-14 VITALS — BODY MASS INDEX: 32.96 KG/M2 | HEIGHT: 67 IN | WEIGHT: 210 LBS

## 2021-07-14 DIAGNOSIS — Z98.890 S/P ARTHROSCOPY OF SHOULDER: Primary | ICD-10-CM

## 2021-07-14 PROCEDURE — 99213 OFFICE O/P EST LOW 20 MIN: CPT | Performed by: ORTHOPAEDIC SURGERY

## 2021-07-14 NOTE — PROGRESS NOTES
Procedure: Right shoulder arthroscopic rotator cuff repair and biceps tenodesis  Date of procedure: 1/5/21    HPI: Brant Piña is a 46 y.o. female who is approximately 3 months status post the aforementioned procedure. She indicates that she continues to do well. She has completed physical therapy and states that she has kept up with some of her exercises though not as diligently as she probably could be. She indicates that she is really having no pain and can do everything she wants to and is still noticing some but minor improvement. She still struggles trying to get her arm up. She indicates that she has no difficulty doing this while supine. Physical examination:  Ht 5' 7\" (1.702 m)   Wt 210 lb (95.3 kg)   BMI 32.89 kg/m²   Evaluation of patient's right shoulder and upper extremity demonstrates her incisions to be healed appropriately. There is minimal to no swelling at this time. Sensation is grossly intact light touch in all dermatomes and she has a 2+ radial pulse. ROM: (Degrees)    Right   A P   Left   A P    Elevation  105 120   Elevation  150   Abduction  100 120   Abduction  145    ER   60 60   ER   80   IR   L3    IR   L3   90 abd/ER      90 abd/ER     90 abd/IR      90 abd/IR     Crepitation  No     Crepitation  No      Muscle strength:    Right       Left    Deltoid   5    Deltoid   5  Supraspinatus  5    Supraspinatus  5  ER   5    ER   5  IR   5    IR   5    Special tests    Right   Rotator Cuff    Left    n   Painful arc    n   n   Pain with ER    n    n   Neer's     n    n   Hawkin's    n    n   Drop Arm    n  n   Lift off/Belly Press   n  n   ER Lag    n      Elie Knott is a 46 y.o. female  who is approximately 6 months status post a right shoulder arthroscopic rotator cuff repair and biceps tenodesis. Although her motion is not back to normal  She is doing relatively well at this time.   As stated above she indicates that she is still noticing some minor improvements with her motion. She was encouraged to keep up with her home exercise program.  I did also have a discussion with her about performing her strengthening exercises while upright using lighter weights to help build up strength in the shoulder which may help with her active range of motion. She may continue to use this arm as she feels comfortable and I will see her back for reevaluation in another 6 months but she was encouraged to return or call earlier with questions or concerns.

## 2021-08-13 ENCOUNTER — TELEPHONE (OUTPATIENT)
Dept: FAMILY MEDICINE CLINIC | Age: 51
End: 2021-08-13

## 2021-08-13 RX ORDER — NITROFURANTOIN 25; 75 MG/1; MG/1
100 CAPSULE ORAL 2 TIMES DAILY
Qty: 10 CAPSULE | Refills: 0 | Status: SHIPPED | OUTPATIENT
Start: 2021-08-13 | End: 2021-08-18

## 2021-08-13 NOTE — TELEPHONE ENCOUNTER
----- Message from Kerri Brooks sent at 8/13/2021 10:51 AM EDT -----  Subject: Message to Provider    QUESTIONS  Information for Provider? Patient is calling asking if the provider will   call in something for UTI Please call patient to advise Behzad on   [facility] [phone]  ---------------------------------------------------------------------------  --------------  Dereck ONTIVEROS  What is the best way for the office to contact you? OK to leave message on   voicemail  Preferred Call Back Phone Number? [phone]  ---------------------------------------------------------------------------  --------------  SCRIPT ANSWERS  Relationship to Patient?  Self

## 2021-08-13 NOTE — TELEPHONE ENCOUNTER
Pt stated burning, frequency, symptoms started today getting worse, cant give sample at this time.  Adele Sampson

## 2021-09-30 ENCOUNTER — TELEPHONE (OUTPATIENT)
Dept: ORTHOPEDIC SURGERY | Age: 51
End: 2021-09-30

## 2021-09-30 DIAGNOSIS — M25.512 LEFT SHOULDER PAIN, UNSPECIFIED CHRONICITY: Primary | ICD-10-CM

## 2021-10-01 ENCOUNTER — OFFICE VISIT (OUTPATIENT)
Dept: ORTHOPEDIC SURGERY | Age: 51
End: 2021-10-01
Payer: COMMERCIAL

## 2021-10-01 VITALS — BODY MASS INDEX: 31.39 KG/M2 | HEIGHT: 67 IN | WEIGHT: 200 LBS

## 2021-10-01 DIAGNOSIS — M75.82 ROTATOR CUFF TENDINITIS, LEFT: Primary | ICD-10-CM

## 2021-10-01 DIAGNOSIS — M25.512 LEFT SHOULDER PAIN, UNSPECIFIED CHRONICITY: ICD-10-CM

## 2021-10-01 PROCEDURE — 99214 OFFICE O/P EST MOD 30 MIN: CPT | Performed by: ORTHOPAEDIC SURGERY

## 2021-10-01 RX ORDER — LEVOTHYROXINE SODIUM 0.15 MG/1
TABLET ORAL
Qty: 30 TABLET | Refills: 5 | Status: SHIPPED | OUTPATIENT
Start: 2021-10-01 | End: 2022-04-08

## 2021-10-01 RX ORDER — DICLOFENAC SODIUM 75 MG/1
75 TABLET, DELAYED RELEASE ORAL 2 TIMES DAILY WITH MEALS
Qty: 28 TABLET | Refills: 0 | Status: SHIPPED | OUTPATIENT
Start: 2021-10-01 | End: 2022-05-02 | Stop reason: ALTCHOICE

## 2021-10-10 NOTE — PROGRESS NOTES
ondansetron (ZOFRAN) 4 MG tablet Take 1 tablet by mouth daily as needed for Nausea or Vomiting 20 tablet 0    PRILOSEC OTC 20 MG tablet TAKE ONE TABLET BY MOUTH DAILY 30 tablet 3    levothyroxine (SYNTHROID) 150 MCG tablet TAKE ONE TABLET BY MOUTH DAILY 30 tablet 5     No current facility-administered medications for this visit. Allergies  Allergies have been reviewed. Dracy Mcdonald has No Known Allergies. Social History  Darcy Mcdonald  reports that she quit smoking about 11 years ago. Her smoking use included cigarettes. She has a 12.00 pack-year smoking history. She has never used smokeless tobacco. She reports current alcohol use. She reports that she does not use drugs. Family History  Marleni's family history includes COPD in her father; Cancer in her mother; Colon Polyps in her brother; Kidney Disease in her father. Physical Exam:     Ht 5' 7\" (1.702 m)   Wt 200 lb (90.7 kg)   BMI 31.32 kg/m²    Constitutional: Patient is oriented to person, place, and time. Patient appears well-developed and well nourished. Mental Status/psychiatric: Behavior is normal. Thought content normal.  HENT: Negative otherwise noted  Head: Normocephalic and Atraumatic  Nose: Normal  Respiratory/Cardio: Effort normal. No respiratory distress  Gait: normal    Shoulder:    Skin: Skin is warm and dry; no swelling or obvious muscular atrophy.    Vasculature: 2+ radial pulses bilaterally  Neuro: Sensation grossly intact to light touch diffusely  Tenderness: Tender to palpation over the anterolateral corner of the left shoulder    ROM: (Degrees)    Right   A P   Left   A P    Elevation  105    Elevation  145   Abduction  105    Abduction  150    ER   60    ER   75   IR   L3    IR   L3   90 abd/ER      90 abd/ER     90 abd/IR      90 abd/IR     Crepitation  No    Crepitation No  Dyskenesia  No    Dyskenesia No      Muscle strength:    Right       Left    Deltoid   5    Deltoid   5  Supraspinatus  5    Supraspinatus  5  ER   5    ER   5  IR   5    IR   5    Special tests    Right   Rotator Cuff    Left    n   Painful arc    n   n   Pain with ER    n    n   Neer's     y    n   Hawkin's    y    n   Drop Arm    n  n   Lift off/Belly Press   n  n   ER Lag    n          AC Joint  n   AC tenderness   n  n   Cross-chest adduction  n       Labrum/biceps    n   [place]ok[place]'s    n   n   Biceps sheer    n      n   Speed's/Yergason's   n    n   Tenderness Biceps Groove  n    n   David's    n         Instability  n   Ant Apprehension   n    n   Post Apprehension   n    n   Ant Load shift    n    n   Post Load shift   n   n   Sulcus     n  n   Generalized Laxity   n  n   Relocation test   n  n   Crank test     n  n   Ruiz-superior escape  n       Imaging:  Xrays: 4 views of the left shoulder obtained on 10/1/21 were independently reviewed  Indications: Left shoulder pain  Findings: Normal glenohumeral joint space. Mild to moderate osteophytic change involving the distal clavicle at the acromioclavicular joint. Calcification noted in the posterosuperior aspect of the subacromial space  Impression: Left shoulder radiographs with moderate acromioclavicular joint degenerative changes as noted above in addition to calcification within the infraspinatus tendon in the subacromial space. Impression/Plan:     Giuliana Mcmanus is a 46 y.o. old female with left shoulder pain due to what appears to be calcific rotator cuff tendinitis. I had a discussion with the patient today educating her about this condition and discussing treatment options available to her including nonoperative and operative intervention. I recommended proceeding conservatively at this time with a prescription NSAID versus cortisone injection to the subacromial space in addition to some therapy.   She elected to proceed with the prescription NSAID and so prescription for

## 2021-10-21 ENCOUNTER — HOSPITAL ENCOUNTER (OUTPATIENT)
Dept: PHYSICAL THERAPY | Facility: CLINIC | Age: 51
Setting detail: THERAPIES SERIES
Discharge: HOME | End: 2021-10-21
Payer: COMMERCIAL

## 2021-10-21 PROCEDURE — 97110 THERAPEUTIC EXERCISES: CPT

## 2021-10-21 PROCEDURE — 97140 MANUAL THERAPY 1/> REGIONS: CPT

## 2021-10-21 PROCEDURE — 97161 PT EVAL LOW COMPLEX 20 MIN: CPT

## 2021-10-21 NOTE — CONSULTS
Right   C5 Shld Abd 4+ 4   Shld Flexion 4+ 4+   Shld IR 5 5   Shld ER 4+ 5   Shld HAB 5 5   Shld Ext 5 5   C6 Elb Flex 5 5   C7 Elb Ext 5 5           AROM/PROM     Left Left   Shld Abd 133 165   Shld Flex 140 160   Shld IR T10 60   Shld ER  75                  ROM   Cervical    Flexion WNL   Extension WNL   Rotation L gilliland 25% R gilliland 25%   Sidebend L R   Retraction            TESTS (+/-) LEFT RIGHT Not Tested   Neers +  []   Lara +  []   Empty Can +  []      []       OBSERVATION No Deficit Deficit Not Tested Comments   Posture       Forward Head [] [x] []    Rounded Shoulders [] [x] []    Kyphosis [] [x] []    Slumped Sitting [] [x] []      Somatic Dysfunctions Normal Deficit Details   Thoracic   [] [x] FRSL T2-T6   Rib   [] [x] L 1st rib elevated  L 4-6th rib post    FRS=flexed, rotated, side-bent  ERS=extended, rotated, side-bent   MOB=Mobilization  MFR=Myofascial Release  MET=Muscle Energy Technique  MFR=Myofascial Release        Flexibility Normal LUE Deficit RUE Deficit   UTrap [] [x] []   L. Scap [] [x] []   Scalenes  [] [x] []   Pec Major [] [x] []   Pec Minor [] [x] []             FUNCTION Normal Difficult Unable   Lift/Carry [] [x] []   OH reach [] [x] []       Functional Test: QUICK DASH Score: 32% functionally impaired       Assessment:    Patient would benefit from skilled physical therapy services in order to return to normal     Problems:    [x] ? Pain:  [x] ? ROM:  [x] ? Strength      Goals  MET NOT MET ON-  GOING  Details   Date Addressed:        STG: To be met in 10 treatments           1. ? Pain: Decrease pain levels to 5/10 with ADLs []  []  []      2. ? ROM: Increase flexibility and AROM limitations throughout to equal bilat to reduce difficulty with ADLs []  []  []      3. ? Strength: Increase MMT to 5/5 throughout to ease functional limitations and mobility  []  []  []     4. Independent with Home Exercise Programs []  []  []     5.  Demonstrate knowledge of fall risk prevention  []  []  [] []  []  []     Date Addressed:        LTG: To be met in 20 treatments       1. Improve score on assessment tool Quick DASH from 32% impairment to less than 15% impairment  []  []  []     2. Reduce pain levels to 3/10 or less with ADLs/sport []  []  []      []  []  []                Patient goals: decrease pain     Rehab Potential:  [x] Good  [] Fair  [] Poor   Suggested Professional Referral:  [x] No  [] Yes:  Barriers to Goal Achievement[de-identified]  [x] No  [] Yes:  Domestic Concerns:  [x] No  [] Yes:    Pt. Education:  [x] Plans/Goals, Risks/Benefits discussed  [x] Home exercise program  Method of Education: [x] Verbal  [x] Demo  [x] Written  Comprehension of Education:  [x] Verbalizes understanding. [x] Demonstrates understanding. [x] Needs Review. [] Demonstrates/verbalizes understanding of HEP/Ed previously given. Treatment Plan:  [x] Therapeutic Exercise    [] Modalities:  [x] Therapeutic Activity    [] Ultrasound  [] Electrical Stimulation  [] Gait Training     [] Lumbar/Cervical Traction  [x] Neuromuscular Re-education [] Cold/hotpack [] Iontophoresis: 4 mg/mL  [x] Instruction in HEP              Dexamethasone Sodium Phosphate 40-80 mAmin  [x] Manual Therapy             []  Aquatic Therapy       [x] Vasocompression: Lillie Canavan  [] Other:    []  Medication allergies reviewed for use of    Dexamethasone Sodium Phosphate 4mg/ml     with iontophoresis treatments. Pt is not allergic. Frequency:  2 x/week for 20 visits      Todays Treatment:       Exercises:  Exercise    LUE shoulder RTC tendonitis  Reps/ Time Weight/ Level Comments         Bike            Tband      Rows      Ext      HAB      B.ER      ER      IR            Shoulder retraction   HEP   UT Stretch   HEP   L. Scap Stretch   HEP                           Other:  Somatic Dysfunctions Normal Deficit Details   Thoracic   [] [x] FRSL T2-T6-MET  MFR UT, LScap   Rib   [] [x] L 1st rib elevated-MET  L 4-6th rib post-MET  LUE Pec minor, pec major DI FRS=flexed, rotated, side-bent  ERS=extended, rotated, side-bent   MOB=Mobilization  MFR=Myofascial Release  MET=Muscle Energy Technique  MFR=Myofascial Release            Specific Instructions for next treatment:    Evaluation Complexity:  History (Personal factors, comorbidities) [x] 0 [] 1-2 [] 3+   Exam (limitations, restrictions) [x] 1-2 [] 3 [] 4+   Clinical presentation (progression) [x] Stable [] Evolving  [] Unstable   Decision Making [x] Low [] Moderate [] High    [x] Low Complexity [] Moderate Complexity [] High Complexity       Treatment Charges: Mins Units   [x] Evaluation       [x]  Low       []  Moderate       []  High 25 1   []  Modalities     /[x]  Ther Exercise 10 1   [x]  Manual Therapy 10 1   []  Ther Activities     []  Aquatics     []  Vasocompression     []  Other       TOTAL TREATMENT TIME: 50    Time in: 1400      Time out: 0930    Electronically signed by: Annalee Christianson PT        Physician Signature:________________________________Date:__________________  By signing above or cosigning this note, I have reviewed this plan of care and certify a need for medically necessary rehabilitation services.      *PLEASE SIGN ABOVE AND FAX BACK ALL PAGES*

## 2021-10-28 ENCOUNTER — HOSPITAL ENCOUNTER (OUTPATIENT)
Dept: PHYSICAL THERAPY | Facility: CLINIC | Age: 51
Setting detail: THERAPIES SERIES
Discharge: HOME | End: 2021-10-28
Payer: COMMERCIAL

## 2021-10-28 PROCEDURE — 97110 THERAPEUTIC EXERCISES: CPT

## 2021-10-28 PROCEDURE — 97140 MANUAL THERAPY 1/> REGIONS: CPT

## 2021-10-28 NOTE — FLOWSHEET NOTE
[x] THE [facility] &  Therapy  [facility]   [place]: [phone]  F: [phone]      Physical Therapy Daily Treatment Note    Date:  10/28/2021  Patient Name:  Angus Diehl    :  1970  MRN: [id number]  Physician: Dr Yulissa Jett: Lucy Obrien (30 v, 21 remain, pre-auth required)  Medical Diagnosis: LUE RTC tendonitis                  Rehab Codes: M75.82  Onset Date: 2021                Next 's appt. : -     Visit# / total visits:      Cancels/No Shows:     Subjective:    Pain:  [] Yes  [x] No Location: L shoulder Pain Rating: (0-10 scale) 0/10  Pain altered Tx:  [x] No  [] Yes  Action:  Comments: Patient arrived noting decreased symptoms post last treatment, notes has been more aware of posture and complaint with HEP. Objective:  Exercises:  Exercise     LUE shoulder RTC tendonitis  Reps/ Time Weight/ Level Comments             Bike  10'             Corner stretch  3x30\"               Tband         Rows  2x10  Green     Ext  2x10  Green     HAB  2x10  Green     B. ER  2x10  Green     ER  2x10  Green     IR  2x10  Green               Shoulder retraction     HEP   UT Stretch  3x30\"   HEP   L. Scap Stretch  3x30\"   HEP             Prone      Row 2x10     Ext 2x10     HAB 2x10     Scap 2x10                         Other:  Somatic Dysfunctions Normal Deficit Details   Thoracic    []?  [x]?   Hypervolt to Upper thoracic   Rib    []?  [x]?    LUE Pec minor, pec major DI   FRS=flexed, rotated, side-bent  ERS=extended, rotated, side-bent     MOB=Mobilization  MFR=Myofascial Release  MET=Muscle Energy Technique  MFR=Myofascial Release         Treatment Charges: Mins Units   []  Modalities     [x]  Ther Exercise 30 2   [x]  Manual Therapy 10 1   []  Ther Activities     []  Aquatics     []  Vasocompression     []  Other     Total Treatment time 40 3       Assessment: [x] Progressing toward goals.  Progressed strength and scapular program this date. Patient notes fatigue with no associated pain. Decreased tightness noted post manual this date. Will continue manual and scapular progressions as tolerated. [] No change. [] Other:  [x] Patient would continue to benefit from skilled physical therapy services in order to: decrease tightness and increase strength to decrease pain with ADLs.    Goals  MET NOT MET ON-  GOING  Details   Date Addressed:            STG: To be met in 10 treatments  ?          1. ? Pain: Decrease pain levels to 5/10 with ADLs []? ?  []??  []??      2. ? ROM: Increase flexibility and AROM limitations throughout to equal bilat to reduce difficulty with ADLs []? ?  []??  []??      3. ? Strength: Increase MMT to 5/5 throughout to ease functional limitations and mobility  []? ?  []??  []??      4. Independent with Home Exercise Programs []? ?  []??  []??      5. Demonstrate knowledge of fall risk prevention  []? ?  []??  []??        []? ?  []??  []??      Date Addressed:            LTG: To be met in 20 treatments           1. Improve score on assessment tool Quick DASH from 32% impairment to less than 15% impairment  []??  []??  []??      2. Reduce pain levels to 3/10 or less with ADLs/sport []? ?  []??  []??        []? ?  []??  []??                        Patient goals: decrease pain       Pt. Education:  [x] Yes  [] No  [x] Reviewed Prior HEP/Ed: Discussed continued postural awareness and stretching. Method of Education: [x] Verbal  [] Demo  [] Written  Comprehension of Education:  [x] Verbalizes understanding. [] Demonstrates understanding. [] Needs review. [x] Demonstrates/verbalizes HEP/Ed previously given. Plan: [x] Continue current frequency toward long and short term goals. [x] Specific Instructions for subsequent treatments: progress scapular program per tolerance.        Time In: 0900              Time Out: 0950    Electronically signed by:  Bala Bagley PTA

## 2021-11-01 ENCOUNTER — HOSPITAL ENCOUNTER (OUTPATIENT)
Dept: PHYSICAL THERAPY | Facility: CLINIC | Age: 51
Setting detail: THERAPIES SERIES
Discharge: HOME | End: 2021-11-01
Payer: COMMERCIAL

## 2021-11-01 PROCEDURE — 97140 MANUAL THERAPY 1/> REGIONS: CPT

## 2021-11-01 PROCEDURE — 97110 THERAPEUTIC EXERCISES: CPT

## 2021-11-01 NOTE — FLOWSHEET NOTE
bilateral UT and decreased ROM in bilateral UE's. Pt notes improved flexibility post manual. Will continue to progress as able. [] No change. [] Other:  [x] Patient would continue to benefit from skilled physical therapy services in order to: decrease tightness and increase strength to decrease pain with ADLs.    Goals  MET NOT MET ON-  GOING  Details   Date Addressed:            STG: To be met in 10 treatments  ?          1. ? Pain: Decrease pain levels to 5/10 with ADLs []? ?  []??  []??      2. ? ROM: Increase flexibility and AROM limitations throughout to equal bilat to reduce difficulty with ADLs []? ?  []??  []??      3. ? Strength: Increase MMT to 5/5 throughout to ease functional limitations and mobility  []? ?  []??  []??      4. Independent with Home Exercise Programs []? ?  []??  []??      5. Demonstrate knowledge of fall risk prevention  []? ?  []??  []??        []? ?  []??  []??      Date Addressed:            LTG: To be met in 20 treatments           1. Improve score on assessment tool Quick DASH from 32% impairment to less than 15% impairment  []??  []??  []??      2. Reduce pain levels to 3/10 or less with ADLs/sport []? ?  []??  []??        []? ?  []??  []??                        Patient goals: decrease pain       Pt. Education:  [x] Yes  [] No  [x] Reviewed Prior HEP/Ed: pt will continue previous HEP given. Method of Education: [x] Verbal  [] Demo  [] Written  Comprehension of Education:  [x] Verbalizes understanding. [x] Demonstrates understanding. [] Needs review. [x] Demonstrates/verbalizes HEP/Ed previously given. Plan: [x] Continue current frequency toward long and short term goals. [x] Specific Instructions for subsequent treatments: progress scapular program per tolerance.        Time In: 2:00pm              Time Out: 2:50pm    Electronically signed by:  Mickie Billy PTA

## 2021-11-04 ENCOUNTER — HOSPITAL ENCOUNTER (OUTPATIENT)
Dept: PHYSICAL THERAPY | Facility: CLINIC | Age: 51
Setting detail: THERAPIES SERIES
Discharge: HOME | End: 2021-11-04
Payer: COMMERCIAL

## 2021-11-04 PROCEDURE — 97110 THERAPEUTIC EXERCISES: CPT

## 2021-11-04 PROCEDURE — 97140 MANUAL THERAPY 1/> REGIONS: CPT

## 2021-11-04 NOTE — FLOWSHEET NOTE
[x] THE [facility] &  Therapy  [facility]   [place]: [phone]  F: [phone]      Physical Therapy Daily Treatment Note    Date:  2021  Patient Name:  Martha Abarca    :  1970  MRN: [id number]  Physician: Dr Dru Farrell: Jason Agrawal (30 v, 21 remain, pre-auth required)  Medical Diagnosis: LUE RTC tendonitis                  Rehab Codes: M75.82  Onset Date: 2021                 Next 's appt. : -     Visit# / total visits:      Cancels/No Shows:     Subjective:    Pain:  [] Yes  [x] No Location: L shoulder Pain Rating: (0-10 scale) 0/10  Pain altered Tx:  [x] No  [] Yes  Action:  Comments: Patient arrived stating she can use her LUE to put a pony tail in her hair but to raise her arm up to put deodorant on results in increased pain when bringing the arm down. Pt reports its a quick sharp pain then it goes away. Objective:  Exercises:  Exercise     LUE shoulder RTC tendonitis  Reps/ Time Weight/ Level Comments             Bike  10'             Corner stretch  3x30\" T/Y              Tband         Rows  2x10  Green     Ext  2x10  Green     HAB  2x10  Green     B. ER  2x10  Green     ER+HAB  2x10  Green     IR  2x10  Green               UT Stretch  3x30\"   HEP   L. Scap Stretch  3x30\"   HEP             Prone      Row 2x10 2#    Ext 2x10 2#    HAB 2x10 2#    Scap 2x10                         Other:  Somatic Dysfunctions Normal Deficit Details   Thoracic    []?  [x]?   Hypervolt to Upper thoracic/bilateral UT   Rib    []?  [x]?    LUE Pec minor, pec major DI   FRS=flexed, rotated, side-bent  ERS=extended, rotated, side-bent     MOB=Mobilization  MFR=Myofascial Release  MET=Muscle Energy Technique  MFR=Myofascial Release         Treatment Charges: Mins Units   []  Modalities     [x]  Ther Exercise 30 2   [x]  Manual Therapy 10 1   []  Ther Activities     []  Aquatics     []  Vasocompression     []  Other     Total Treatment time 40 3       Assessment: [x] Progressing toward goals. Continued with program, pt notes improved mobility post manual. Pt notes increased UT tightness, therefore, instructed pt to perform UT stretch throughout the day. Will continue with LUE ROM and strength. [] No change. [] Other:  [x] Patient would continue to benefit from skilled physical therapy services in order to: decrease tightness and increase strength to decrease pain with ADLs.    Goals  MET NOT MET ON-  GOING  Details   Date Addressed:            STG: To be met in 10 treatments  ?          1. ? Pain: Decrease pain levels to 5/10 with ADLs []? ?  []??  []??      2. ? ROM: Increase flexibility and AROM limitations throughout to equal bilat to reduce difficulty with ADLs []? ?  []??  []??      3. ? Strength: Increase MMT to 5/5 throughout to ease functional limitations and mobility  []? ?  []??  []??      4. Independent with Home Exercise Programs []? ?  []??  []??      5. Demonstrate knowledge of fall risk prevention  []? ?  []??  []??        []? ?  []??  []??      Date Addressed:            LTG: To be met in 20 treatments           1. Improve score on assessment tool Quick DASH from 32% impairment to less than 15% impairment  []??  []??  []??      2. Reduce pain levels to 3/10 or less with ADLs/sport []? ?  []??  []??        []? ?  []??  []??                        Patient goals: decrease pain       Pt. Education:  [x] Yes  [] No  [x] Reviewed Prior HEP/Ed: pt will continue previous HEP given. Method of Education: [x] Verbal  [] Demo  [] Written  Comprehension of Education:  [x] Verbalizes understanding. [x] Demonstrates understanding. [] Needs review. [x] Demonstrates/verbalizes HEP/Ed previously given. Plan: [x] Continue current frequency toward long and short term goals. [x] Specific Instructions for subsequent treatments: progress scapular program per tolerance.        Time In: 2:00pm              Time Out: 2:50pm    Electronically signed by:  Bautista Lewis PTA

## 2021-11-11 ENCOUNTER — HOSPITAL ENCOUNTER (OUTPATIENT)
Dept: PHYSICAL THERAPY | Facility: CLINIC | Age: 51
Setting detail: THERAPIES SERIES
Discharge: HOME | End: 2021-11-11
Payer: COMMERCIAL

## 2021-11-11 PROCEDURE — 97110 THERAPEUTIC EXERCISES: CPT

## 2021-11-11 PROCEDURE — 97140 MANUAL THERAPY 1/> REGIONS: CPT

## 2021-11-11 NOTE — FLOWSHEET NOTE
[x] THE [facility] &  Therapy  [facility]   [place]: [phone]  F: [phone]      Physical Therapy Daily Treatment Note    Date:  2021  Patient Name:  Zeinab Arteaga    :  1970  MRN: [id number]  Physician: Dr Erickson Pall: Authur Aase (30 v, 21 remain, pre-auth required)  Medical Diagnosis: LUE RTC tendonitis                  Rehab Codes: M75.82  Onset Date: 2021                 Next 's appt. : -     Visit# / total visits:      Cancels/No Shows:     Subjective:    Pain:  [] Yes  [x] No Location: L shoulder Pain Rating: (0-10 scale) 0/10  Pain altered Tx:  [x] No  [] Yes  Action:  Comments: Patient arrived noting continued improvements with increased ROM. Objective:  Exercises:  Exercise     LUE shoulder RTC tendonitis  Reps/ Time Weight/ Level Comments             Bike  10'             Corner stretch  3x30\" T/Y              Tband         Rows  2x10 Blue     Ext  2x10 Blue     HAB  2x10 Blue     B. ER  2x10 Blue     ER+HAB  2x10 Blue     IR  2x10 Blue               UT Stretch  3x30\"   HEP   L. Scap Stretch  3x30\"   HEP             Prone      Row 2x10 2#    Ext 2x10 2#    HAB 2x10 2#    Scap 2x10                         Other:  Somatic Dysfunctions Normal Deficit Details   Thoracic     []?  [x]?   Hypervolt to Upper thoracic/bilateral UT   Rib    []?  [x]?    LUE Pec minor, pec major DI   FRS=flexed, rotated, side-bent  ERS=extended, rotated, side-bent     MOB=Mobilization  MFR=Myofascial Release  MET=Muscle Energy Technique  MFR=Myofascial Release         Treatment Charges: Mins Units   []  Modalities     [x]  Ther Exercise 30 2   [x]  Manual Therapy 10 1   []  Ther Activities     []  Aquatics     []  Vasocompression     []  Other     Total Treatment time 40 3       Assessment: [x] Progressing toward goals. Progressed strength program this visit.  Patient notes increased mobility post manual.     [] No change. [] Other:  [x] Patient would continue to benefit from skilled physical therapy services in order to: decrease tightness and increase strength to decrease pain with ADLs.    Goals  MET NOT MET ON-  GOING  Details   Date Addressed:            STG: To be met in 10 treatments  ?          1. ? Pain: Decrease pain levels to 5/10 with ADLs []? ?  []??  []??      2. ? ROM: Increase flexibility and AROM limitations throughout to equal bilat to reduce difficulty with ADLs []? ?  []??  []??      3. ? Strength: Increase MMT to 5/5 throughout to ease functional limitations and mobility  []? ?  []??  []??      4. Independent with Home Exercise Programs []? ?  []??  []??      5. Demonstrate knowledge of fall risk prevention  []? ?  []??  []??        []? ?  []??  []??      Date Addressed:            LTG: To be met in 20 treatments           1. Improve score on assessment tool Quick DASH from 32% impairment to less than 15% impairment  []??  []??  []??      2. Reduce pain levels to 3/10 or less with ADLs/sport []? ?  []??  []??        []? ?  []??  []??                        Patient goals: decrease pain       Pt. Education:  [x] Yes  [] No  [x] Reviewed Prior HEP/Ed: pt will continue previous HEP given. Method of Education: [x] Verbal  [] Demo  [] Written  Comprehension of Education:  [x] Verbalizes understanding. [x] Demonstrates understanding. [] Needs review. [x] Demonstrates/verbalizes HEP/Ed previously given. Plan: [x] Continue current frequency toward long and short term goals. [x] Specific Instructions for subsequent treatments: progress scapular program per tolerance.        Time In: 1400              Time Out: 1450    Electronically signed by:  Jeaneth Mcdowell PTA

## 2021-11-17 ENCOUNTER — HOSPITAL ENCOUNTER (OUTPATIENT)
Dept: PHYSICAL THERAPY | Facility: CLINIC | Age: 51
Setting detail: THERAPIES SERIES
Discharge: HOME | End: 2021-11-17
Payer: COMMERCIAL

## 2021-11-17 PROCEDURE — 97110 THERAPEUTIC EXERCISES: CPT

## 2021-11-17 NOTE — FLOWSHEET NOTE
Chief Complaint   Patient presents with   • Knee     New patient visit, consult requested by Kathryn Wilson NP, left knee pain, no known injury, pain onset was 2 weeks ago while getting into a truck, she had immediate pain and swelling on the lateral aspect, the pain persisted all day, she was using a cane PRN, she takes Excedrin PRN, the pain has never fully gone away.        Physician Requesting Consultation: Savana Wilson NP    History of present illness:    Smiley is a 69 year old  female for whom consultation is kindly requested by primary care provider for evaluation left knee pain. 2 weeks ago she acutely injured her knee by twisting it as she was getting into her pickup truck. She had quite bit of pain and some swelling. Pain is since persisted. She has both medial and lateral discomfort. She uses a cane as needed and takes Excedrin which seems to help. The swelling is not really gone down and she continues to have discomfort. She is otherwise in good health. She spends her [place] in [place] and is quite active. They're leaving mid October for [place].    Past Medical History:   Diagnosis Date   • Hypertension    • Osteoporosis, unspecified 2012       Family History   Problem Relation Age of Onset   • High blood pressure Mother          post fracture hip. Had dementia.    • Hypertension Mother        Past Surgical History:   Procedure Laterality Date   • Chemosurg mohs 1st stage      2 lesions, posterior pharynx   • Eye surgery Bilateral     Lasik:   • Laminotomy   approx    L4-L5   • Tonsillectomy and adenoidectomy         Current Outpatient Prescriptions   Medication Sig Dispense Refill   • omega-3 acid ethyl esters (LOVAZA) 1 g capsule TAKE 2 CAPSULES TWICE A  capsule 0   • Cholecalciferol (VITAMIN D) 2000 units tablet Take 2,000 Units by mouth daily.     • lisinopril (ZESTRIL) 10 MG tablet Take 1 tablet by mouth daily. 90 tablet 3   • triamcinolone (ARISTOCORT) 0.1 % cream  [x] [facility]  Outpatient Rehabilitation &  Therapy  [facility]   [place]: [phone]  F: [phone]      Physical Therapy Daily Treatment Note    Date:  2021  Patient Name:  Jose Fernando    :  1970  MRN: [id number]  Physician: Dr Key Laly: Bobo Rivers approved including evaluation 10/1/21-21  Medical Diagnosis: JOSE RTC tendonitis                  Rehab Codes: M75.82  Onset Date: 2021                 Next 's appt. : -     Visit# / total visits:      Cancels/No Shows:     Subjective:    Pain:  [] Yes  [x] No Location: L shoulder Pain Rating: (0-10 scale) 0/10  Pain altered Tx:  [x] No  [] Yes  Action:  Comments: Patient arrived stating she did a weight class yesterday and has some tenderness today. Pt reports she was only using 3# and 5#. Pt reports she still has difficulty reaching behind her head to put a pony tail up. Objective:  Exercises:  Exercise     JOSE shoulder RTC tendonitis  Reps/ Time Weight/ Level Comments             Bike  10'             *Pulleys 3' abduction    *Single arm in doorway S  3x30\"     *Wall walks 10x10\" abduction    *Wall walk liftoffs  x10 flexion          Tband         Rows  2x10 Blue     *Extension  2x10 Blue     *Flexion   2x10 Blue    HAB  2x10 Blue Against wall    B. ER  2x10 Blue     ER  2x10 Blue     IR  2x10 Blue     *Flexion walkouts 10x10\" Blue              *UT Stretch  3x30\"      *L. Scap Stretch  3x30\"      *Scapular depression  x20               Prone      Row 2x10 2#    Ext 2x10 2#    HAB 2x10 2#    Scap 2x10                         Other:       Treatment Charges: Mins Units   []  Modalities     [x]  Ther Exercise 40 3   []  Manual Therapy     []  Ther Activities     []  Aquatics     []  Vasocompression     []  Other     Total Treatment time 40 3       Assessment: [x] Progressing toward goals.  Focused on stretching and postural strength ex's, pt required cueing to correct Apply to affected area twice per day. 45 g 1   • Tetrahydrozoline-Zn Sulfate (EYE DROPS ALLERGY RELIEF OP)      • trimethoprim-polymyxin b (POLYTRIM) ophthalmic solution Place 1 drop into both eyes every 4 hours. While awake. 5-7 days 10 mL 0   • Valacyclovir HCl 1000 MG Tab Take one-half tab twice a day as directed. 10 tablet 2   • Calcium Carbonate-Vitamin D (CALCIUM 600+D PO) Take 2 tablets by mouth.       No current facility-administered medications for this visit.        Allergies as of 09/13/2018   • (No Known Allergies)         Physical exam:    Visit Vitals  Resp 16   Ht 5' 8\" (1.727 m)   Wt 88.5 kg   BMI 29.65 kg/m²     Constitutional:  Well developed, well nourished, no acute distress, speech and behavior appropriate.  Eyes:  Conjunctivae normal.  HENT:  Atraumatic, external ears normal, nose normal.    Musculoskeletal:      Pleasant female. She has quite a large effusion that is diffuse and can be palpated anteriorly as well as posteriorly. She has just a hint of lateral joint line tenderness. She is able fully extend her knee with difficulty and is able flex to 120° with considerable discomfort. There is no instability. She is able straight leg raise without an extensor leg. The calf is without tenderness. Distally neurovascular is intact pulses sensation strength to resisted motor testing. There is no tenderness over the extensor mechanism and no pain with patellofemoral compression. There is very minimal crepitus with range of motion.    Integument:  Well hydrated, no rash.  Lymphatic:  No lymphadenopathy noted.  Neurologic:  Alert and oriented x 3, normal motor function, normal sensory function, no focal deficits noted.    Studies:      X-rays are unremarkable    Diagnosis:      Probable meniscal tear with large effusion    Plan:      I used an anatomic knee model to explain the findings and my thoughts. We'll obtain an MRI and I'll see her back to go over the results.    Is including ice and  form throughout. Updated HEP to focus ROM and stretching and to improve scapular depression with shoulder flexion and abduction. Pt plans to perform HEP 2x daily. Educated pt to stretch and work on ROM for RUE and strength in [facility]. Will review HEP and progress as tolerated next visit. [] No change. [] Other:  [x] Patient would continue to benefit from skilled physical therapy services in order to: decrease tightness and increase strength to decrease pain with ADLs.    Goals  MET NOT MET ON-  GOING  Details   Date Addressed:            STG: To be met in 10 treatments  ?          1. ? Pain: Decrease pain levels to 5/10 with ADLs []? ?  []??  []??      2. ? ROM: Increase flexibility and AROM limitations throughout to equal bilat to reduce difficulty with ADLs []? ?  []??  []??      3. ? Strength: Increase MMT to 5/5 throughout to ease functional limitations and mobility  []? ?  []??  []??      4. Independent with Home Exercise Programs []? ?  []??  []??      5. Demonstrate knowledge of fall risk prevention  []? ?  []??  []??        []? ?  []??  []??      Date Addressed:            LTG: To be met in 20 treatments           1. Improve score on assessment tool Quick DASH from 32% impairment to less than 15% impairment  []??  []??  []??      2. Reduce pain levels to 3/10 or less with ADLs/sport []? ?  []??  []??        []? ?  []??  []??                        Patient goals: decrease pain       Pt. Education:  [x] Yes  [] No  [x] Reviewed Prior HEP/Ed: single arm in door way pect stretch, TBand walkouts flexion, scapular depression, pulleys abduction, wall walks abduction, TBand shoulder flexion/extension. Method of Education: [x] Verbal  [] Demo  [] Written  Comprehension of Education:  [x] Verbalizes understanding. [x] Demonstrates understanding. [] Needs review. [x] Demonstrates/verbalizes HEP/Ed previously given. Plan: [x] Continue current frequency toward long and short term goals.     [x] Specific Instructions for subsequent treatments: progress scapular program per tolerance.        Time In: 1:05pm              Time Out: 1:55pm    Electronically signed by:  Mary Mane PTA anti-inflammatories along with their side effects were discussed and she will let us know she has a difficulties prior to return.    Thank you for allowing me to participate in your patient's care. I appreciate the opportunity to do so.

## 2021-11-23 ENCOUNTER — HOSPITAL ENCOUNTER (OUTPATIENT)
Dept: PHYSICAL THERAPY | Facility: CLINIC | Age: 51
Setting detail: THERAPIES SERIES
Discharge: HOME | End: 2021-11-23
Payer: COMMERCIAL

## 2021-11-23 PROCEDURE — 97110 THERAPEUTIC EXERCISES: CPT

## 2021-11-23 NOTE — FLOWSHEET NOTE
[x] [facility]  Outpatient Rehabilitation &  Therapy  [facility] [phone]  F: [phone]      Physical Therapy Daily Treatment Note    Date:  2021  Patient Name:  Nichole Nicole    :  1970  MRN: [id number]  Physician: Dr Yomaira Vazquez: [place] approved including evaluation 10/1/21-21  Medical Diagnosis: LUE RTC tendonitis                  Rehab Codes: M75.82  Onset Date: 2021                 Next 's appt. : -     Visit# / total visits:      Cancels/No Shows:     Subjective:    Pain:  [] Yes  [x] No Location: L shoulder Pain Rating: (0-10 scale) 0/10  Pain altered Tx:  [x] No  [] Yes  Action:  Comments: Patient arrived stating she hasn't been able to complete her HEP everyday because she has been busy getting ready for the holiday. Pt reports she has been focusing on recruiting the correct muscles to make sure she is not compensating for the motion but she has to move very slowly. Pt reports she has her weight lifting class tonight. Pt reports she still has a hard time putting her hair in a pony tale. Objective:  Exercises:  Exercise     LUE shoulder RTC tendonitis  Reps/ Time Weight/ Level Comments             Bike  10'             Pulleys 3'/3' Flex/abd    Single arm in doorway S 3x30\"     Wall walks 10x10\" Flex/abd    Wall walk liftoffs  x10 flexion          Tband         Rows  2x10 Purple     Extension  2x10 Purple     Flexion   2x10 Blue    HAB  2x10 Blue    B. ER  2x10 Blue     ER  2x10 Purple     IR  2x10 Purple     Flexion walkouts 10x10\" Purple              UT Stretch  3x30\"      L. Scap Stretch  3x30\"                Wand flexion  x10     Wand abduction  x10     Wand ER x10                                     Other:       Treatment Charges: Mins Units   []  Modalities     [x]  Ther Exercise 40 3   []  Manual Therapy     []  Ther Activities     []  Aquatics     []  Vasocompression     []  Other Total Treatment time 40 3       Assessment: [x] Progressing toward goals. Continued with program adding wand ex's, pt with tightness at end range with all ex's. Pt with good recall on all ex's noting increased pain with door way stretch. Will continue to progress strength and ROM in LUE.      [] No change. [] Other:  [x] Patient would continue to benefit from skilled physical therapy services in order to: decrease tightness and increase strength to decrease pain with ADLs.    Goals  MET NOT MET ON-  GOING  Details   Date Addressed:            STG: To be met in 10 treatments  ?          1. ? Pain: Decrease pain levels to 5/10 with ADLs []? ?  []??  []??      2. ? ROM: Increase flexibility and AROM limitations throughout to equal bilat to reduce difficulty with ADLs []? ?  []??  []??      3. ? Strength: Increase MMT to 5/5 throughout to ease functional limitations and mobility  []? ?  []??  []??      4. Independent with Home Exercise Programs []? ?  []??  []??      5. Demonstrate knowledge of fall risk prevention  []? ?  []??  []??        []? ?  []??  []??      Date Addressed:            LTG: To be met in 20 treatments           1. Improve score on assessment tool Quick DASH from 32% impairment to less than 15% impairment  []??  []??  []??      2. Reduce pain levels to 3/10 or less with ADLs/sport []? ?  []??  []??        []? ?  []??  []??                        Patient goals: decrease pain       Pt. Education:  [x] Yes  [] No  [x] Reviewed Prior HEP/Ed: continue previous HEP. Method of Education: [x] Verbal  [] Demo  [] Written  Comprehension of Education:  [x] Verbalizes understanding. [x] Demonstrates understanding. [] Needs review. [x] Demonstrates/verbalizes HEP/Ed previously given. Plan: [x] Continue current frequency toward long and short term goals. [x] Specific Instructions for subsequent treatments: progress scapular program per tolerance.        Time In: 1:05pm              Time Out: 1:55pm    Electronically signed by:  Kaila Quesada PTA

## 2021-12-03 ENCOUNTER — HOSPITAL ENCOUNTER (OUTPATIENT)
Dept: PHYSICAL THERAPY | Facility: CLINIC | Age: 51
Setting detail: THERAPIES SERIES
Discharge: HOME | End: 2021-12-03
Payer: COMMERCIAL

## 2021-12-03 NOTE — FLOWSHEET NOTE
[x] [facility]  Outpatient Rehabilitation &  Therapy  [facility]   [place]: [phone]  F: [phone]      Physical Therapy Cancel/No Show note    Date: 12/3/2021  Patient: Michael Lawrence  : 1970  MRN: [id number]    Cancels/No Shows to date: 1    For today's appointment patient:    [x]  Cancelled    [] Rescheduled appointment    [] No-show     Reason given by patient:    [x]  Patient ill    []  Conflicting appointment    [] No transportation      [] Conflict with work    [] No reason given    [] Weather related    [] COVID-19    [] Other:      Comments:        [x] Next appointment was confirmed    Electronically signed by: Noemí Alexandra PTA

## 2021-12-08 ENCOUNTER — HOSPITAL ENCOUNTER (OUTPATIENT)
Dept: PHYSICAL THERAPY | Facility: CLINIC | Age: 51
Setting detail: THERAPIES SERIES
Discharge: HOME | End: 2021-12-08
Payer: COMMERCIAL

## 2021-12-08 PROCEDURE — 97110 THERAPEUTIC EXERCISES: CPT

## 2021-12-08 NOTE — FLOWSHEET NOTE
[x] [facility]  Outpatient Rehabilitation &  Therapy  [facility]   [place]: [phone]  F: [phone]      Physical Therapy Daily Treatment Note    Date:  2021  Patient Name:  Lauren Lofton    :  1970  MRN: [id number]  Physician: Dr Jansen Montana: Flora Ortega approved including evaluation 10/1/21-21  Medical Diagnosis: LUE RTC tendonitis                  Rehab Codes: M75.82  Onset Date: 2021                 Next 's appt. : -     Visit# / total visits:      Cancels/No Shows: 1      Subjective:    Pain:  [] Yes  [x] No Location: LUE  shoulder Pain Rating: (0-10 scale) 0/10  Pain altered Tx:  [x] No  [] Yes  Action:  Comments: Patient arrived 15 mins late today for her appointment, reports pain is still 3/10 with activity. Pt reports she still has a hard time putting her hair in a pony tale. She has not been keeping up with     Objective:      Exercise     LUE shoulder RTC tendonitis  Reps/ Time Weight/ Level Comments             Bike  10'             Single arm in doorway S 3x30\"     Wall walks 10x10\" Flex/abd    Wall walk liftoffs  x10 flexion          Tband         Rows  2x10 Purple     Extension  2x10 Purple     Flexion   2x10 Blue    HAB  2x10 Purple     B. ER  2x10 Purple      ER  2x10 Purple     IR  2x10 Purple     Flexion walkouts 10x10\" Purple              UT Stretch  3x30\"      L. Scap Stretch  3x30\"                Wand flexion standing x10     Wand abduction standing  x10     Wand ER x10           Supine punches 2x10 2#    Supine ABCs  x2 2#               Other:       Treatment Charges: Mins Units   []  Modalities     [x]  Ther Exercise 40 3   []  Manual Therapy     []  Ther Activities     []  Aquatics     []  Vasocompression     []  Other     Total Treatment time 40 3       Assessment: [x] Progressing toward goals. Continued with her program today, advanced supine program and added standing with the wand exercises. Patient continues to have pain with overhead motions especially. Discussed need to be more consistent with her HEP, patient voices understanding. Discussed return to see MD, patient will have a recheck next week then return for assessment. [] No change. [] Other:  [x] Patient would continue to benefit from skilled physical therapy services in order to: decrease tightness and increase strength to decrease pain with ADLs.    Goals  MET NOT MET ON-  GOING  Details   Date Addressed:            STG: To be met in 10 treatments  ?          1. ? Pain: Decrease pain levels to 5/10 with ADLs []? ?  []??  []??      2. ? ROM: Increase flexibility and AROM limitations throughout to equal bilat to reduce difficulty with ADLs []? ?  []??  []??      3. ? Strength: Increase MMT to 5/5 throughout to ease functional limitations and mobility  []? ?  []??  []??      4. Independent with Home Exercise Programs []? ?  []??  []??      5. Demonstrate knowledge of fall risk prevention  []? ?  []??  []??        []? ?  []??  []??      Date Addressed:            LTG: To be met in 20 treatments           1. Improve score on assessment tool Quick DASH from 32% impairment to less than 15% impairment  []??  []??  []??      2. Reduce pain levels to 3/10 or less with ADLs/sport []? ?  []??  []??        []? ?  []??  []??                        Patient goals: decrease pain       Pt. Education:  [x] Yes  [] No  [x] Reviewed Prior HEP/Ed: continue previous HEP. Method of Education: [x] Verbal  [] Demo  [] Written  Comprehension of Education:  [x] Verbalizes understanding. [x] Demonstrates understanding. [] Needs review. [x] Demonstrates/verbalizes HEP/Ed previously given. Plan: [x] Continue current frequency toward long and short term goals. [x] Specific Instructions for subsequent treatments: progress scapular program per tolerance.        Time In: 0815             Time Out: 0723    Electronically signed by:  Rashmi Bach, PT

## 2021-12-14 ENCOUNTER — HOSPITAL ENCOUNTER (OUTPATIENT)
Dept: PHYSICAL THERAPY | Facility: CLINIC | Age: 51
Setting detail: THERAPIES SERIES
Discharge: HOME | End: 2021-12-14
Payer: COMMERCIAL

## 2021-12-14 PROCEDURE — 97110 THERAPEUTIC EXERCISES: CPT

## 2021-12-14 NOTE — FLOWSHEET NOTE
[x] [facility]  Outpatient Rehabilitation &  Therapy  [facility]   [place]: [phone]  F: [phone]      Physical Therapy Daily Treatment Note and Discharge Summary     Date:  2021  Patient Name:  Jorge Guidry    :  1970  MRN: [id number]  Physician: Dr Valentín Jordan: Angela Wilhelm approved including evaluation 10/1/21-21  Medical Diagnosis: LUE RTC tendonitis                  Rehab Codes: M75.82  Onset Date: 2021                   Next 's appt. : -     Visit# / total visits:      Cancels/No Shows: 1      Subjective:    Pain:  [] Yes  [x] No Location: LUE  shoulder Pain Rating: (0-10 scale) 0/10  Pain altered Tx:  [x] No  [] Yes  Action:  Comments: Patient arrived today with no pain at rest but continued pain with overhead motions. Pain is 3/10 with non-strenuous lifting but increases to 8/10 if she pushes it overhead. Objective:      Exercise     LUE shoulder RTC tendonitis  Reps/ Time Weight/ Level Comments             Bike  10'             Single arm in doorway S 3x30\"     Wall walks 10x10\" Flex/abd    Wall walk liftoffs  x10 flexion          Tband         Rows  2x10 Purple     Extension  2x10 Purple     Flexion   2x10 Blue    HAB  2x10 Purple     B. ER  2x10 Purple      ER  2x10 Purple     IR  2x10 Purple     Flexion walkouts 10x10\" Purple              UT Stretch  3x30\"      L. Scap Stretch  3x30\"                Wand flexion standing x10 2#    Wand abduction standing  x10 2#    Wand ER x10           Supine punches 2x10 2#    Supine ABCs  x2 2#               Other:      AROM STRENGTH     LEFT LEFT        SHLD FLEX 170 4+   SHLD  4+   SHLD ER 80 4+   SHLD IR T12 5        ELBOW FLEX  5   ELBOW EXT.   5              Treatment Charges: Mins Units   []  Modalities     [x]  Ther Exercise 40 3   []  Manual Therapy     []  Ther Activities     []  Aquatics     []  Vasocompression     []  Other     Total Treatment time 40

## 2021-12-22 ENCOUNTER — OFFICE VISIT (OUTPATIENT)
Dept: ORTHOPEDIC SURGERY | Age: 51
End: 2021-12-22
Payer: COMMERCIAL

## 2021-12-22 VITALS — HEIGHT: 67 IN | BODY MASS INDEX: 31.39 KG/M2 | WEIGHT: 200 LBS

## 2021-12-22 DIAGNOSIS — Z98.890 S/P ARTHROSCOPY OF SHOULDER: Primary | ICD-10-CM

## 2021-12-22 PROCEDURE — 99213 OFFICE O/P EST LOW 20 MIN: CPT | Performed by: ORTHOPAEDIC SURGERY

## 2021-12-22 NOTE — PROGRESS NOTES
Procedure: Right shoulder arthroscopic rotator cuff repair and biceps tenodesis  Date of procedure: 1/5/21    HPI: Pricila Nazario is a 46 y.o. female who is approximately 1 year status post the aforementioned procedure. She indicates that she continues to do well. She still has some difficulty raising her arm significantly overhead but is able to do everything that she wants to with this arm. I did see her relatively recently for her left shoulder and she was diagnosed with calcific tendinitis. She responded well to physical therapy but over the past few days has noticed some pain in her shoulder. She is attributing it at this time recent baking activity. Physical examination:  Ht 5' 7\" (1.702 m)   Wt 200 lb (90.7 kg)   BMI 31.32 kg/m²   Evaluation of patient's right shoulder and upper extremity demonstrates her incisions to be healed appropriately. There is minimal to no swelling at this time. Sensation is grossly intact light touch in all dermatomes and she has a 2+ radial pulse. ROM: (Degrees)    Right   A P   Left   A P    Elevation  105 115   Elevation  150   Abduction  195 125   Abduction  145    ER   40 50   ER   70   IR   L3    IR   T12   90 abd/ER      90 abd/ER     90 abd/IR      90 abd/IR     Crepitation  No     Crepitation  No      Muscle strength:    Right       Left    Deltoid   5    Deltoid   5  Supraspinatus  5    Supraspinatus  5  ER   5    ER   5  IR   5    IR   5    Special tests    Right   Rotator Cuff    Left    n   Painful arc    n   n   Pain with ER    n    n   Neer's     n    n   Hawkin's    n    n   Drop Arm    n  n   Lift off/Belly Press   n  n   ER Lag    n      Antonia Aurea is a 46 y.o. female  who is approximately 1 year status post a right shoulder arthroscopic rotator cuff repair and biceps tenodesis. She remains essentially unchanged compared to her last visit. She is doing relatively well.   At this time she was encouraged to keep up with

## 2022-01-11 ENCOUNTER — TELEPHONE (OUTPATIENT)
Dept: ORTHOPEDIC SURGERY | Age: 52
End: 2022-01-11

## 2022-01-11 NOTE — TELEPHONE ENCOUNTER
Called pt to see if this was a new issue or a follow up form her appt that she had 3 weeks ago. Pt stated that this is for her opposite shoulder and she had been evaluated in October for this issue. At that appt, he offered a cortisone injection but she opted for the oral NSAIDs. He also noted that he would like an MRI of the shoulder prior to her next visit but she would like to keep her appt and discuss this with Dr. Cheng Briseno and possibly get an injection. She said that Advil is helping her with her pain during the day but at night the pain starts right back up.

## 2022-01-12 ENCOUNTER — OFFICE VISIT (OUTPATIENT)
Dept: ORTHOPEDIC SURGERY | Age: 52
End: 2022-01-12
Payer: COMMERCIAL

## 2022-01-12 VITALS — HEIGHT: 67 IN | WEIGHT: 200 LBS | BODY MASS INDEX: 31.39 KG/M2

## 2022-01-12 DIAGNOSIS — M75.32 CALCIFIC TENDINITIS OF LEFT SHOULDER REGION: Primary | ICD-10-CM

## 2022-01-12 PROCEDURE — 20610 DRAIN/INJ JOINT/BURSA W/O US: CPT | Performed by: ORTHOPAEDIC SURGERY

## 2022-01-12 RX ORDER — TRIAMCINOLONE ACETONIDE 40 MG/ML
40 INJECTION, SUSPENSION INTRA-ARTICULAR; INTRAMUSCULAR ONCE
Status: COMPLETED | OUTPATIENT
Start: 2022-01-12 | End: 2022-01-12

## 2022-01-12 RX ORDER — LIDOCAINE HYDROCHLORIDE 10 MG/ML
3 INJECTION, SOLUTION INFILTRATION; PERINEURAL ONCE
Status: COMPLETED | OUTPATIENT
Start: 2022-01-12 | End: 2022-01-12

## 2022-01-12 RX ADMIN — LIDOCAINE HYDROCHLORIDE 3 ML: 10 INJECTION, SOLUTION INFILTRATION; PERINEURAL at 13:14

## 2022-01-12 RX ADMIN — TRIAMCINOLONE ACETONIDE 40 MG: 40 INJECTION, SUSPENSION INTRA-ARTICULAR; INTRAMUSCULAR at 13:15

## 2022-01-12 NOTE — PROGRESS NOTES
HPI: Ms. Diego Jimenez is a 75-year-old here today requesting a cortisone injection to her left shoulder. I have seen her in the past for the same shoulder and was diagnosed at the time with the calcific tendinitis. We have treated it conservatively thus far with therapy as well as a short course of a prescription anti-inflammatory. She had intermittent flareups since then most recently about 2 days ago. She had quite a bit of pain keeping her up at night. Consequently she presents today requesting an injection. On exam she does have tenderness palpation over the anterolateral aspect of the shoulder with a positive Lara sign. I did administer a cortisone injection as outlined below and will have her follow-up in my clinic as needed but she may return or call at anytime with persistent or worsening symptoms with any questions or concerns. I would recommend an MRI study of her shoulder prior to that visit. Procedure: left shoulder subacromial space injection  Following an appropriate discussion with the patient regarding the risks and benefits of the procedure she consented to proceed. her left shoulder was prepped using chlorhexadine solution. Using aseptic technique and through a posterior approach, her left shoulder subacromial space was injected with a 4 cc mixture of 1cc 40mg/ml kenalog and 3 cc of 1% lidocaine without epinephrine. A band aid was applied to the injection site. she tolerated the injection with no immediate adverse reactions.

## 2022-04-08 RX ORDER — LEVOTHYROXINE SODIUM 0.15 MG/1
TABLET ORAL
Qty: 30 TABLET | Refills: 5 | Status: SHIPPED | OUTPATIENT
Start: 2022-04-08 | End: 2022-10-14

## 2022-05-02 ENCOUNTER — OFFICE VISIT (OUTPATIENT)
Dept: FAMILY MEDICINE CLINIC | Age: 52
End: 2022-05-02
Payer: COMMERCIAL

## 2022-05-02 VITALS
SYSTOLIC BLOOD PRESSURE: 128 MMHG | HEIGHT: 67 IN | OXYGEN SATURATION: 99 % | DIASTOLIC BLOOD PRESSURE: 68 MMHG | WEIGHT: 220 LBS | BODY MASS INDEX: 34.53 KG/M2 | HEART RATE: 68 BPM

## 2022-05-02 DIAGNOSIS — E06.3 HYPOTHYROIDISM DUE TO HASHIMOTO'S THYROIDITIS: Primary | ICD-10-CM

## 2022-05-02 DIAGNOSIS — E03.8 HYPOTHYROIDISM DUE TO HASHIMOTO'S THYROIDITIS: Primary | ICD-10-CM

## 2022-05-02 DIAGNOSIS — R63.5 WEIGHT GAIN: ICD-10-CM

## 2022-05-02 DIAGNOSIS — Z13.220 SCREENING FOR LIPID DISORDERS: ICD-10-CM

## 2022-05-02 DIAGNOSIS — R01.1 HEART MURMUR: ICD-10-CM

## 2022-05-02 DIAGNOSIS — N95.1 MENOPAUSE SYNDROME: ICD-10-CM

## 2022-05-02 PROBLEM — S62.101S: Status: ACTIVE | Noted: 2020-01-01

## 2022-05-02 PROBLEM — S42.301D CLOSED FRACTURE OF SHAFT OF RIGHT HUMERUS WITH ROUTINE HEALING: Status: ACTIVE | Noted: 2022-05-02

## 2022-05-02 PROCEDURE — 99214 OFFICE O/P EST MOD 30 MIN: CPT | Performed by: FAMILY MEDICINE

## 2022-05-02 RX ORDER — ESTRADIOL/NORETHINDRONE ACETATE TRANSDERMAL SYSTEM .05; .14 MG/D; MG/D
PATCH, EXTENDED RELEASE TRANSDERMAL
COMMUNITY
Start: 2022-01-18

## 2022-05-02 SDOH — ECONOMIC STABILITY: FOOD INSECURITY: WITHIN THE PAST 12 MONTHS, THE FOOD YOU BOUGHT JUST DIDN'T LAST AND YOU DIDN'T HAVE MONEY TO GET MORE.: NEVER TRUE

## 2022-05-02 SDOH — ECONOMIC STABILITY: FOOD INSECURITY: WITHIN THE PAST 12 MONTHS, YOU WORRIED THAT YOUR FOOD WOULD RUN OUT BEFORE YOU GOT MONEY TO BUY MORE.: NEVER TRUE

## 2022-05-02 ASSESSMENT — ENCOUNTER SYMPTOMS
CONSTIPATION: 0
COUGH: 0
SHORTNESS OF BREATH: 0
EYES NEGATIVE: 1
ALLERGIC/IMMUNOLOGIC NEGATIVE: 1
DIARRHEA: 0
BLOOD IN STOOL: 1
ABDOMINAL PAIN: 0

## 2022-05-02 ASSESSMENT — PATIENT HEALTH QUESTIONNAIRE - PHQ9
SUM OF ALL RESPONSES TO PHQ QUESTIONS 1-9: 0
SUM OF ALL RESPONSES TO PHQ9 QUESTIONS 1 & 2: 0
1. LITTLE INTEREST OR PLEASURE IN DOING THINGS: 0
SUM OF ALL RESPONSES TO PHQ QUESTIONS 1-9: 0
SUM OF ALL RESPONSES TO PHQ QUESTIONS 1-9: 0
2. FEELING DOWN, DEPRESSED OR HOPELESS: 0
SUM OF ALL RESPONSES TO PHQ QUESTIONS 1-9: 0

## 2022-05-02 ASSESSMENT — SOCIAL DETERMINANTS OF HEALTH (SDOH): HOW HARD IS IT FOR YOU TO PAY FOR THE VERY BASICS LIKE FOOD, HOUSING, MEDICAL CARE, AND HEATING?: NOT HARD AT ALL

## 2022-05-02 NOTE — PROGRESS NOTES
[address] [place] [address] [facility] [address]  Dept: [phone]    5/2/2022    CHIEF COMPLAINT    Chief Complaint   Patient presents with    Thyroid Problem     lab order       HPI    Mariia Saucedo is a 46 y.o. female who presents   Chief Complaint   Patient presents with    Thyroid Problem     lab order   . Recheck chronic conditions including thyroid disorder, gerd. Has had 20 pounds weight gain since 2020 when she had a fx of rt humerus and wrist. Doing therapy at home. Seeing gyn for menopause. Started on HRT. Vitals:    05/02/22 1119   BP: 128/68   Pulse: 68   SpO2: 99%   Weight: 220 lb (99.8 kg)   Height: 5' 7\" (1.702 m)       REVIEW OF SYSTEMS    Review of Systems   Constitutional: Positive for unexpected weight change. Negative for fatigue and fever. HENT: Negative. Eyes: Negative. Respiratory: Negative for cough and shortness of breath. Cardiovascular: Negative for chest pain and leg swelling. Gastrointestinal: Positive for blood in stool. Negative for abdominal pain, constipation and diarrhea. Endocrine: Negative. Genitourinary: Negative for frequency and urgency. Musculoskeletal: Negative. Stiff rt shoulder   Skin: Negative. Allergic/Immunologic: Negative. Neurological: Negative for dizziness and headaches. Hematological: Negative. Psychiatric/Behavioral: Negative for sleep disturbance. The patient is not nervous/anxious.         PAST MEDICAL HISTORY    Past Medical History:   Diagnosis Date    Abnormal Pap smear of cervix     Closed fracture of shaft of right humerus with routine healing 2020    COVID-19 12/2020    Fx wrist, right, sequela 2020    GERD (gastroesophageal reflux disease)     Hypothyroidism     PONV (postoperative nausea and vomiting)        FAMILY HISTORY    Family History   Problem Relation Age of Onset    Cancer Mother         unknown    COPD Father     Kidney Disease Father     Colon Polyps Brother     Heart Disease Brother     Obesity Brother        SOCIAL HISTORY    Social History     Socioeconomic History    Marital status:      Spouse name: None    Number of children: 2    Years of education: None    Highest education level: None   Occupational History    Occupation:    Tobacco Use    Smoking status: Former Smoker     Packs/day: 1.00     Years: 12.00     Pack years: 12.00     Types: Cigarettes     Quit date: 2010     Years since quittin.1    Smokeless tobacco: Never Used   Vaping Use    Vaping Use: Never used   Substance and Sexual Activity    Alcohol use: Yes     Comment: rarely    Drug use: No    Sexual activity: Yes     Partners: Male   Other Topics Concern    None   Social History Narrative    None     Social Determinants of Health     Financial Resource Strain: Low Risk     Difficulty of Paying Living Expenses: Not hard at all   Food Insecurity: No Food Insecurity    Worried About Running Out of Food in the Last Year: Never true    Da of Food in the Last Year: Never true   Transportation Needs:     Lack of Transportation (Medical): Not on file    Lack of Transportation (Non-Medical):  Not on file   Physical Activity:     Days of Exercise per Week: Not on file    Minutes of Exercise per Session: Not on file   Stress:     Feeling of Stress : Not on file   Social Connections:     Frequency of Communication with Friends and Family: Not on file    Frequency of Social Gatherings with Friends and Family: Not on file    Attends Worship Services: Not on file    Active Member of Clubs or Organizations: Not on file    Attends Club or Organization Meetings: Not on file    Marital Status: Not on file   Intimate Partner Violence:     Fear of Current or Ex-Partner: Not on file    Emotionally Abused: Not on file    Physically Abused: Not on file    Sexually Abused: Not on file   Housing Stability:     Unable to Pay for Housing in the Last Year: Not on file    Number of Places Lived in the Last Year: Not on file    Unstable Housing in the Last Year: Not on file       SURGICAL HISTORY    Past Surgical History:   Procedure Laterality Date    CHOLECYSTECTOMY  05/2019    HUMERUS FRACTURE SURGERY Right 5/5/2020    HUMERUS OPEN REDUCTION INTERNAL FIXATION performed by Marycarmen Roca MD at [facility]      x3    SHOULDER ARTHROSCOPY Right 09/22/2020    SHOULDER ARTHROSCOPY - CAPSULAR RELEASE WITH MANIPULATION     SHOULDER ARTHROSCOPY Right 9/22/2020    SHOULDER ARTHROSCOPY - CAPSULAR RELEASE WITH MANIPULATION UNDER ANESTHESIA AND PRE OP INTERSCALENE BLOC performed by Marycarmen Roca MD at [place] [address] ARTHROSCOPY Right 01/05/2021    ROTATOR CUFF REPAIR WITH ARTHREX, DERMAL ALLOGRAFT, ATTEMPTED BONE MARROW ASPIRATE, AND POSSIBLE SUPERIOR CAPSULAR RECONSTRUCTION     SHOULDER ARTHROSCOPY Right 1/5/2021    SHOULDER ARTHROSCOPY, ROTATOR CUFF REPAIR WITH ARTHREX, ATTEMPTED BONE MARROW ASPIRATE, AND BICEPS TENODESIS performed by Marycarmen Roca MD at [address] [place] [address] Right 5/5/2020    WRIST OPEN REDUCTION INTERNAL FIXATION performed by Marycarmen Roca MD at [address] [place] [address]    Current Outpatient Medications   Medication Sig Dispense Refill    estradiol-norethindrone (COMBIPATCH) 0.05-0.14 MG/DAY APPLY ONE PATCH TO THE SKIN TWICE WEEKLY      levothyroxine (SYNTHROID) 150 MCG tablet TAKE ONE TABLET BY MOUTH DAILY 30 tablet 5    PRILOSEC OTC 20 MG tablet TAKE ONE TABLET BY MOUTH DAILY 30 tablet 3     No current facility-administered medications for this visit. ALLERGIES    No Known Allergies    PHYSICAL EXAM   Physical Exam  Vitals reviewed. Constitutional:       Appearance: She is well-developed. She is obese. HENT:      Head: Normocephalic.    Eyes:      Conjunctiva/sclera: Conjunctivae normal.      Pupils: Pupils are equal, round, and reactive to light. Neck:      Thyroid: No thyromegaly. Cardiovascular:      Rate and Rhythm: Normal rate and regular rhythm. Heart sounds: Murmur heard. Systolic murmur is present with a grade of 3/6. Pulmonary:      Effort: Pulmonary effort is normal.      Breath sounds: Normal breath sounds. No wheezing or rales. Abdominal:      Palpations: Abdomen is soft. Tenderness: There is no abdominal tenderness. There is no guarding or rebound. Musculoskeletal:         General: No tenderness or deformity. Normal range of motion. Cervical back: Normal range of motion and neck supple. Lymphadenopathy:      Cervical: No cervical adenopathy. Skin:     General: Skin is warm and dry. Neurological:      Mental Status: She is alert and oriented to person, place, and time. Psychiatric:         Mood and Affect: Mood normal.         Behavior: Behavior normal.         Thought Content: Thought content normal.         Judgment: Judgment normal.         ASSESSMENT/PLAN  1. Hypothyroidism due to Hashimoto's thyroiditis  Recheck and adjust levothyroxine if indicated  - TSH; Future  - T4, Free; Future    2. Menopause syndrome  Continue hormone replacement therapy per GYN  - Vitamin D 25 Hydroxy; Future    3. Weight gain  Try to improve diet and get more active  - Comprehensive Metabolic Panel; Future    4. Screening for lipid disorders    - Lipid Panel; Future    5. Heart murmur  New onset. No symptoms currently. Advised to monitor for symptoms of shortness of breath, chest pain, palpitations or leg swelling. If any symptoms or further concerns will order echocardiogram.    Anjum Jim received counseling on the following healthy behaviors: nutrition, exercise and medication adherence  Reviewed prior labs and health maintenance. Continue current medications, diet and exercise. Discussed use, benefit, and side effects of prescribed medications.  Barriers to medication compliance addressed. Patient given educational materials - see patient instructions. All patient questions answered. Patient voiced understanding. Return in about 1 year (around 5/2/2023) for thyroid.         Electronically signed by Rodriguez Russo MD on 5/2/22 at 11:25 AM EDT

## 2022-05-06 ENCOUNTER — HOSPITAL ENCOUNTER (OUTPATIENT)
Age: 52
Setting detail: SPECIMEN
Discharge: HOME | End: 2022-05-06

## 2022-05-06 DIAGNOSIS — Z13.220 SCREENING FOR LIPID DISORDERS: ICD-10-CM

## 2022-05-06 DIAGNOSIS — R63.5 WEIGHT GAIN: ICD-10-CM

## 2022-05-06 DIAGNOSIS — E03.8 HYPOTHYROIDISM DUE TO HASHIMOTO'S THYROIDITIS: ICD-10-CM

## 2022-05-06 DIAGNOSIS — N95.1 MENOPAUSE SYNDROME: ICD-10-CM

## 2022-05-06 DIAGNOSIS — E06.3 HYPOTHYROIDISM DUE TO HASHIMOTO'S THYROIDITIS: ICD-10-CM

## 2022-05-06 LAB
ALBUMIN SERPL-MCNC: 4.3 G/DL (ref 3.5–5.2)
ALBUMIN/GLOBULIN RATIO: 1.6 (ref 1–2.5)
ALP BLD-CCNC: 88 U/L (ref 35–104)
ALT SERPL-CCNC: 15 U/L (ref 5–33)
ANION GAP SERPL CALCULATED.3IONS-SCNC: 10 MMOL/L (ref 9–17)
AST SERPL-CCNC: 16 U/L
BILIRUB SERPL-MCNC: 0.2 MG/DL (ref 0.3–1.2)
BUN BLDV-MCNC: 13 MG/DL (ref 6–20)
CALCIUM SERPL-MCNC: 9.2 MG/DL (ref 8.6–10.4)
CHLORIDE BLD-SCNC: 108 MMOL/L (ref 98–107)
CHOLESTEROL/HDL RATIO: 3.7
CHOLESTEROL: 185 MG/DL
CO2: 23 MMOL/L (ref 20–31)
CREAT SERPL-MCNC: 0.81 MG/DL (ref 0.5–0.9)
GFR AFRICAN AMERICAN: >60 ML/MIN
GFR NON-AFRICAN AMERICAN: >60 ML/MIN
GFR SERPL CREATININE-BSD FRML MDRD: ABNORMAL ML/MIN/{1.73_M2}
GLUCOSE BLD-MCNC: 91 MG/DL (ref 70–99)
HDLC SERPL-MCNC: 50 MG/DL
LDL CHOLESTEROL: 113 MG/DL (ref 0–130)
POTASSIUM SERPL-SCNC: 4.8 MMOL/L (ref 3.7–5.3)
SODIUM BLD-SCNC: 141 MMOL/L (ref 135–144)
THYROXINE, FREE: 1.51 NG/DL (ref 0.93–1.7)
TOTAL PROTEIN: 7 G/DL (ref 6.4–8.3)
TRIGL SERPL-MCNC: 112 MG/DL
TSH SERPL DL<=0.05 MIU/L-ACNC: 0.8 UIU/ML (ref 0.3–5)
VITAMIN D 25-HYDROXY: 17.3 NG/ML

## 2022-06-28 ENCOUNTER — TELEPHONE (OUTPATIENT)
Dept: ORTHOPEDIC SURGERY | Age: 52
End: 2022-06-28

## 2022-06-28 NOTE — TELEPHONE ENCOUNTER
Called pt to verify which shoulder she is scheduled to see Dr. Caleb Shields for tomorrow. At [address] [place] [address], Dr. Caleb Shields dictated that he would like an MRI of her left shoulder prior to her next OV if she has persistent pain. Pt is wanting to proceed with an MRI, however she is going on vacation at the end of this week so she would still like to be seen and is requesting a repeat injection. She stated that her last injection that was on 1/12/2 has lasted until recently and if she does need to procced surgically, she would like to hold off until at least the fall. She is electing to continue with her visit tomorrow and receive an injection if warranted.

## 2022-06-29 ENCOUNTER — OFFICE VISIT (OUTPATIENT)
Dept: ORTHOPEDIC SURGERY | Age: 52
End: 2022-06-29
Payer: COMMERCIAL

## 2022-06-29 VITALS — HEIGHT: 67 IN | BODY MASS INDEX: 34.53 KG/M2 | WEIGHT: 220 LBS

## 2022-06-29 DIAGNOSIS — M75.32 CALCIFIC TENDINITIS OF LEFT SHOULDER REGION: Primary | ICD-10-CM

## 2022-06-29 PROCEDURE — 20610 DRAIN/INJ JOINT/BURSA W/O US: CPT | Performed by: ORTHOPAEDIC SURGERY

## 2022-06-29 PROCEDURE — 99024 POSTOP FOLLOW-UP VISIT: CPT | Performed by: ORTHOPAEDIC SURGERY

## 2022-06-29 RX ORDER — TRIAMCINOLONE ACETONIDE 40 MG/ML
40 INJECTION, SUSPENSION INTRA-ARTICULAR; INTRAMUSCULAR ONCE
Status: COMPLETED | OUTPATIENT
Start: 2022-06-29 | End: 2022-06-29

## 2022-06-29 RX ORDER — LIDOCAINE HYDROCHLORIDE 10 MG/ML
3 INJECTION, SOLUTION INFILTRATION; PERINEURAL ONCE
Status: COMPLETED | OUTPATIENT
Start: 2022-06-29 | End: 2022-06-29

## 2022-06-29 RX ADMIN — TRIAMCINOLONE ACETONIDE 40 MG: 40 INJECTION, SUSPENSION INTRA-ARTICULAR; INTRAMUSCULAR at 14:09

## 2022-06-29 RX ADMIN — LIDOCAINE HYDROCHLORIDE 3 ML: 10 INJECTION, SOLUTION INFILTRATION; PERINEURAL at 14:08

## 2022-06-29 NOTE — PROGRESS NOTES
HPI: Ms. Kota Romero is a 75-year-old here today once again requesting cortisone injection to her left shoulder. She has been seen for her shoulder and diagnosed with a consult calcific tendinitis. We have treated her conservatively with therapy as well as cortisone injections. The last cortisone injection she received was back on 1/12/2022 and it worked well up until a few weeks ago when she started having pain once again. She states that its not as bad as what it was when she had the injection but she wants to prevent it from getting to that point. We had discussed getting an MRI study of her shoulder should she have recurrent pain with the possibility of surgical intervention. She states that she does have a few weddings coming up and so would like to hold off on any surgical intervention till the fall. She would like another cortisone injection to get her through her summer into the fall. I believe that is reasonable and so she had the injection administered as outlined below. An order for an MRI was also placed to be completed within a few months. I will see her back for reevaluation in 3 months but she may return or call earlier with questions or concerns. Procedure: left shoulder subacromial space injection  Following an appropriate discussion with the patient regarding the risks and benefits of the procedure she consented to proceed. her left shoulder was prepped using chlorhexadine solution. Using aseptic technique and through a posterior approach, her left shoulder subacromial space was injected with a 4 cc mixture of 1cc 40mg/ml kenalog and 3 cc of 1% lidocaine without epinephrine. A band aid was applied to the injection site. she tolerated the injection with no immediate adverse reactions.

## 2022-10-06 ENCOUNTER — HOSPITAL ENCOUNTER (OUTPATIENT)
Dept: MRI IMAGING | Age: 52
Discharge: HOME | End: 2022-10-08
Payer: COMMERCIAL

## 2022-10-06 DIAGNOSIS — M75.32 CALCIFIC TENDINITIS OF LEFT SHOULDER REGION: ICD-10-CM

## 2022-10-06 PROCEDURE — 73221 MRI JOINT UPR EXTREM W/O DYE: CPT

## 2022-10-13 ENCOUNTER — TELEPHONE (OUTPATIENT)
Dept: ORTHOPEDIC SURGERY | Age: 52
End: 2022-10-13

## 2022-10-14 RX ORDER — LEVOTHYROXINE SODIUM 0.15 MG/1
TABLET ORAL
Qty: 30 TABLET | Refills: 5 | Status: SHIPPED | OUTPATIENT
Start: 2022-10-14

## 2022-10-14 NOTE — TELEPHONE ENCOUNTER
patient had MRI of left shoulder, please advise. mpression   1. Focal full-thickness tear of mid supraspinatus in the region of the   critical zone measuring 7 mm in greatest AP dimension. Multifocal up to 50%   partial-thickness articular-surface and interstitial tearing within portions   of mid and posterior supraspinatus and mid infraspinatus between   musculotendinous junction and footplate with moderate underlying   supraspinatus and infraspinatus tendinosis. Known calcific tendinitis is   better visualized on plain radiographs from 10/01/2021.   2. Mild subscapularis tendinosis. 3. Interstitial tearing and mild-to-moderate tendinosis of the long head of   the biceps tendon as it exits the bicipital groove. 4. Mild diffuse labral degeneration. 5. Mild degenerative changes of the left AC joint.        RECOMMENDATIONS:   Unavailable

## 2022-10-17 NOTE — TELEPHONE ENCOUNTER
Please inform patient I reviewed her MRI and she has a small full-thickness tear of the rotator cuff. The calcific deposit appears to be resolved. She can be scheduled if she doesn't already have an appointment to review and discuss further.

## 2022-11-02 ENCOUNTER — OFFICE VISIT (OUTPATIENT)
Dept: ORTHOPEDIC SURGERY | Age: 52
End: 2022-11-02
Payer: COMMERCIAL

## 2022-11-02 VITALS — HEIGHT: 67 IN | WEIGHT: 220 LBS | BODY MASS INDEX: 34.53 KG/M2 | RESPIRATION RATE: 16 BRPM

## 2022-11-02 DIAGNOSIS — M75.121 NONTRAUMATIC COMPLETE TEAR OF RIGHT ROTATOR CUFF: Primary | ICD-10-CM

## 2022-11-02 PROCEDURE — 99212 OFFICE O/P EST SF 10 MIN: CPT | Performed by: ORTHOPAEDIC SURGERY

## 2022-11-03 NOTE — PROGRESS NOTES
HPI: Ms. Karen Adam is a 70-year-old here today to review the results of her left shoulder MRI study which was completed on 10/6/2022. I did review the images with her today independently and it demonstrates what appears to be a small bursal sided high-grade partial-thickness tear to small full-thickness tear of the anterior edge of the supraspinatus in addition to an intrasubstance tear of the infraspinatus tendon. No signs of calcium deposits within the tendons. She was last seen in my clinic approximately 4 months ago at which time she had a cortisone injection. He states that the injection is still working very well for her as she currently has no pain at this time and is able to carry on with her regular activities. I had a discussion with the patient today about the implications of her MRI findings. In the absence of any symptoms at this time I recommended continued conservative management. I will see her back in my clinic as needed but she may return or call at anytime with questions or concerns.

## 2023-03-16 DIAGNOSIS — Z12.31 ENCOUNTER FOR SCREENING MAMMOGRAM FOR MALIGNANT NEOPLASM OF BREAST: Primary | ICD-10-CM

## 2023-04-10 RX ORDER — LEVOTHYROXINE SODIUM 0.15 MG/1
TABLET ORAL
Qty: 30 TABLET | Refills: 0 | Status: SHIPPED | OUTPATIENT
Start: 2023-04-10 | End: 2023-04-28 | Stop reason: SDUPTHER

## 2023-04-28 RX ORDER — LEVOTHYROXINE SODIUM 0.15 MG/1
150 TABLET ORAL DAILY
Qty: 30 TABLET | Refills: 5 | Status: SHIPPED | OUTPATIENT
Start: 2023-04-28

## 2023-05-09 RX ORDER — LEVOTHYROXINE SODIUM 0.15 MG/1
TABLET ORAL
Qty: 30 TABLET | Refills: 5 | Status: SHIPPED | OUTPATIENT
Start: 2023-05-09

## 2023-05-09 NOTE — TELEPHONE ENCOUNTER
Santos Nava is calling to request a refill on the following medication(s):    Last Visit Date (If Applicable):  4/2/8437    Next Visit Date:    6/6/2023    Medication Request:  Requested Prescriptions     Pending Prescriptions Disp Refills    levothyroxine (SYNTHROID) 150 MCG tablet [Pharmacy Med Name: LEVOTHYROXINE 150 MCG TABLET] 30 tablet 5     Sig: TAKE ONE TABLET BY MOUTH DAILY

## 2023-06-06 ENCOUNTER — OFFICE VISIT (OUTPATIENT)
Dept: FAMILY MEDICINE CLINIC | Age: 53
End: 2023-06-06
Payer: COMMERCIAL

## 2023-06-06 ENCOUNTER — HOSPITAL ENCOUNTER (OUTPATIENT)
Age: 53
Setting detail: SPECIMEN
Discharge: HOME | End: 2023-06-06

## 2023-06-06 VITALS
WEIGHT: 222.6 LBS | BODY MASS INDEX: 34.86 KG/M2 | DIASTOLIC BLOOD PRESSURE: 82 MMHG | SYSTOLIC BLOOD PRESSURE: 118 MMHG | HEART RATE: 82 BPM

## 2023-06-06 DIAGNOSIS — K21.9 GASTROESOPHAGEAL REFLUX DISEASE WITHOUT ESOPHAGITIS: ICD-10-CM

## 2023-06-06 DIAGNOSIS — E55.9 VITAMIN D DEFICIENCY: ICD-10-CM

## 2023-06-06 DIAGNOSIS — N95.1 MENOPAUSE SYNDROME: ICD-10-CM

## 2023-06-06 DIAGNOSIS — E06.3 HYPOTHYROIDISM DUE TO HASHIMOTO'S THYROIDITIS: ICD-10-CM

## 2023-06-06 DIAGNOSIS — E03.8 HYPOTHYROIDISM DUE TO HASHIMOTO'S THYROIDITIS: ICD-10-CM

## 2023-06-06 DIAGNOSIS — E06.3 HYPOTHYROIDISM DUE TO HASHIMOTO'S THYROIDITIS: Primary | ICD-10-CM

## 2023-06-06 DIAGNOSIS — E66.09 CLASS 1 OBESITY DUE TO EXCESS CALORIES WITHOUT SERIOUS COMORBIDITY WITH BODY MASS INDEX (BMI) OF 34.0 TO 34.9 IN ADULT: ICD-10-CM

## 2023-06-06 DIAGNOSIS — E03.8 HYPOTHYROIDISM DUE TO HASHIMOTO'S THYROIDITIS: Primary | ICD-10-CM

## 2023-06-06 LAB
25(OH)D3 SERPL-MCNC: 33.8 NG/ML
T4 FREE SERPL-MCNC: 1.5 NG/DL (ref 0.9–1.7)
TSH SERPL-MCNC: 1.98 UIU/ML (ref 0.3–5)

## 2023-06-06 PROCEDURE — 99214 OFFICE O/P EST MOD 30 MIN: CPT | Performed by: FAMILY MEDICINE

## 2023-06-06 SDOH — ECONOMIC STABILITY: FOOD INSECURITY: WITHIN THE PAST 12 MONTHS, THE FOOD YOU BOUGHT JUST DIDN'T LAST AND YOU DIDN'T HAVE MONEY TO GET MORE.: NEVER TRUE

## 2023-06-06 SDOH — ECONOMIC STABILITY: HOUSING INSECURITY
IN THE LAST 12 MONTHS, WAS THERE A TIME WHEN YOU DID NOT HAVE A STEADY PLACE TO SLEEP OR SLEPT IN A SHELTER (INCLUDING NOW)?: NO

## 2023-06-06 SDOH — ECONOMIC STABILITY: INCOME INSECURITY: HOW HARD IS IT FOR YOU TO PAY FOR THE VERY BASICS LIKE FOOD, HOUSING, MEDICAL CARE, AND HEATING?: NOT HARD AT ALL

## 2023-06-06 SDOH — ECONOMIC STABILITY: FOOD INSECURITY: WITHIN THE PAST 12 MONTHS, YOU WORRIED THAT YOUR FOOD WOULD RUN OUT BEFORE YOU GOT MONEY TO BUY MORE.: NEVER TRUE

## 2023-06-06 ASSESSMENT — ENCOUNTER SYMPTOMS
COUGH: 0
BLOOD IN STOOL: 0
ALLERGIC/IMMUNOLOGIC NEGATIVE: 1
EYES NEGATIVE: 1
ABDOMINAL PAIN: 0
CONSTIPATION: 0
SHORTNESS OF BREATH: 0
DIARRHEA: 0

## 2023-06-06 ASSESSMENT — PATIENT HEALTH QUESTIONNAIRE - PHQ9
SUM OF ALL RESPONSES TO PHQ QUESTIONS 1-9: 0
SUM OF ALL RESPONSES TO PHQ QUESTIONS 1-9: 0
1. LITTLE INTEREST OR PLEASURE IN DOING THINGS: 0
SUM OF ALL RESPONSES TO PHQ QUESTIONS 1-9: 0
2. FEELING DOWN, DEPRESSED OR HOPELESS: 0
SUM OF ALL RESPONSES TO PHQ QUESTIONS 1-9: 0
SUM OF ALL RESPONSES TO PHQ9 QUESTIONS 1 & 2: 0

## 2023-06-06 NOTE — PROGRESS NOTES
[address] [place] [address] [facility] [address]  Dept: [phone]    6/6/2023    CHIEF COMPLAINT    Chief Complaint   Patient presents with    Hypothyroidism       HPI    Elvis Giraldo is a 52 y.o. female who presents   Chief Complaint   Patient presents with    Hypothyroidism   . Recheck hyoithyroidism. Patient taking medication, levothyroxine 150 mcg. Energy and weight are stable. She started vitamin D supplement last year after being found to be low. Taking Prilosec for chronic GERD. Has not been able to stop medication without significant symptoms. Had a colonoscopy 2 years ago but has not had any upper scope. Seeing a gynecologist and using transdermal patch for hormone replacement therapy. Vitals:    06/06/23 0817   BP: 118/82   Pulse: 82   Weight: 222 lb 9.6 oz (101 kg)       REVIEW OF SYSTEMS    Review of Systems   Constitutional:  Negative for fatigue, fever and unexpected weight change. HENT: Negative. Eyes: Negative. Respiratory:  Negative for cough and shortness of breath. Cardiovascular:  Negative for chest pain and leg swelling. Gastrointestinal:  Negative for abdominal pain, blood in stool, constipation and diarrhea. Endocrine: Negative. Genitourinary:  Negative for frequency and urgency. Musculoskeletal:  Positive for arthralgias (Limited range of motion of right shoulder from prior trauma). Skin: Negative. Allergic/Immunologic: Negative. Neurological:  Negative for dizziness and headaches. Hematological: Negative. Psychiatric/Behavioral:  Negative for sleep disturbance. The patient is not nervous/anxious.       PAST MEDICAL HISTORY    Past Medical History:   Diagnosis Date    Abnormal Pap smear of cervix     Closed fracture of shaft of right humerus with routine healing 2020    COVID-19 12/2020    Fx wrist, right, sequela 2020    GERD (gastroesophageal reflux disease)

## 2024-04-12 RX ORDER — LEVOTHYROXINE SODIUM 0.15 MG/1
TABLET ORAL
Qty: 30 TABLET | Refills: 0 | Status: SHIPPED | OUTPATIENT
Start: 2024-04-12 | End: 2024-05-10

## 2024-04-12 NOTE — TELEPHONE ENCOUNTER
Marleni Shen is calling to request a refill on the following medication(s):    Last Visit Date (If Applicable):  6/6/2023    Next Visit Date:    Visit date not found    Medication Request:  Requested Prescriptions     Pending Prescriptions Disp Refills    levothyroxine (SYNTHROID) 150 MCG tablet [Pharmacy Med Name: LEVOTHYROXINE 150 MCG TABLET] 30 tablet 5     Sig: TAKE ONE TABLET BY MOUTH DAILY

## 2024-05-10 RX ORDER — LEVOTHYROXINE SODIUM 0.15 MG/1
150 TABLET ORAL DAILY
Qty: 30 TABLET | Refills: 5 | Status: SHIPPED | OUTPATIENT
Start: 2024-05-10

## 2024-05-10 NOTE — TELEPHONE ENCOUNTER
Marleni Shen is calling to request a refill on the following medication(s):    Last Visit Date (If Applicable):  6/6/2023    Next Visit Date:    5/14/2024    Medication Request:  Requested Prescriptions     Pending Prescriptions Disp Refills    levothyroxine (SYNTHROID) 150 MCG tablet [Pharmacy Med Name: LEVOTHYROXINE 150 MCG TABLET] 30 tablet 0     Sig: TAKE 1 TABLET BY MOUTH DAILY

## 2024-05-14 ENCOUNTER — HOSPITAL ENCOUNTER (OUTPATIENT)
Age: 54
Setting detail: SPECIMEN
Discharge: HOME | End: 2024-05-14

## 2024-05-14 ENCOUNTER — OFFICE VISIT (OUTPATIENT)
Dept: FAMILY MEDICINE CLINIC | Age: 54
End: 2024-05-14
Payer: COMMERCIAL

## 2024-05-14 VITALS
DIASTOLIC BLOOD PRESSURE: 72 MMHG | HEART RATE: 72 BPM | SYSTOLIC BLOOD PRESSURE: 110 MMHG | HEIGHT: 67 IN | BODY MASS INDEX: 35.03 KG/M2 | WEIGHT: 223.2 LBS

## 2024-05-14 DIAGNOSIS — Z13.220 SCREENING FOR LIPID DISORDERS: ICD-10-CM

## 2024-05-14 DIAGNOSIS — E55.9 VITAMIN D DEFICIENCY: ICD-10-CM

## 2024-05-14 DIAGNOSIS — E06.3 HYPOTHYROIDISM DUE TO HASHIMOTO'S THYROIDITIS: ICD-10-CM

## 2024-05-14 DIAGNOSIS — E66.09 CLASS 1 OBESITY DUE TO EXCESS CALORIES WITHOUT SERIOUS COMORBIDITY WITH BODY MASS INDEX (BMI) OF 34.0 TO 34.9 IN ADULT: ICD-10-CM

## 2024-05-14 DIAGNOSIS — E03.8 HYPOTHYROIDISM DUE TO HASHIMOTO'S THYROIDITIS: ICD-10-CM

## 2024-05-14 DIAGNOSIS — E06.3 HYPOTHYROIDISM DUE TO HASHIMOTO'S THYROIDITIS: Primary | ICD-10-CM

## 2024-05-14 DIAGNOSIS — E03.8 HYPOTHYROIDISM DUE TO HASHIMOTO'S THYROIDITIS: Primary | ICD-10-CM

## 2024-05-14 LAB
25(OH)D3 SERPL-MCNC: 35.5 NG/ML (ref 30–100)
ALBUMIN SERPL-MCNC: 4.6 G/DL (ref 3.5–5.2)
ALBUMIN/GLOB SERPL: 1 {RATIO} (ref 1–2.5)
ALP SERPL-CCNC: 96 U/L (ref 35–104)
ALT SERPL-CCNC: 28 U/L (ref 10–35)
ANION GAP SERPL CALCULATED.3IONS-SCNC: 12 MMOL/L (ref 9–16)
AST SERPL-CCNC: 29 U/L (ref 10–35)
BILIRUB SERPL-MCNC: 0.4 MG/DL (ref 0–1.2)
BUN SERPL-MCNC: 11 MG/DL (ref 6–20)
CALCIUM SERPL-MCNC: 9.2 MG/DL (ref 8.6–10.4)
CHLORIDE SERPL-SCNC: 109 MMOL/L (ref 98–107)
CHOLEST SERPL-MCNC: 187 MG/DL (ref 0–199)
CHOLESTEROL/HDL RATIO: 3
CO2 SERPL-SCNC: 20 MMOL/L (ref 20–31)
CREAT SERPL-MCNC: 0.9 MG/DL (ref 0.5–0.9)
GFR, ESTIMATED: 80 ML/MIN/1.73M2
GLUCOSE SERPL-MCNC: 88 MG/DL (ref 74–99)
HDLC SERPL-MCNC: 54 MG/DL
LDLC SERPL CALC-MCNC: 102 MG/DL (ref 0–100)
POTASSIUM SERPL-SCNC: 4.1 MMOL/L (ref 3.7–5.3)
PROT SERPL-MCNC: 7.8 G/DL (ref 6.6–8.7)
SODIUM SERPL-SCNC: 141 MMOL/L (ref 136–145)
T4 FREE SERPL-MCNC: 1.8 NG/DL (ref 0.92–1.68)
TRIGL SERPL-MCNC: 154 MG/DL
TSH SERPL DL<=0.05 MIU/L-ACNC: 1.79 UIU/ML (ref 0.27–4.2)
VLDLC SERPL CALC-MCNC: 31 MG/DL

## 2024-05-14 PROCEDURE — 99214 OFFICE O/P EST MOD 30 MIN: CPT | Performed by: FAMILY MEDICINE

## 2024-05-14 RX ORDER — LEVOTHYROXINE SODIUM 0.15 MG/1
150 TABLET ORAL DAILY
Qty: 90 TABLET | Refills: 3 | Status: SHIPPED | OUTPATIENT
Start: 2024-05-14

## 2024-05-14 ASSESSMENT — ENCOUNTER SYMPTOMS
COUGH: 0
BLOOD IN STOOL: 0
DIARRHEA: 0
SHORTNESS OF BREATH: 0
CONSTIPATION: 0
EYES NEGATIVE: 1
ABDOMINAL PAIN: 0
ALLERGIC/IMMUNOLOGIC NEGATIVE: 1

## 2024-05-14 ASSESSMENT — PATIENT HEALTH QUESTIONNAIRE - PHQ9
1. LITTLE INTEREST OR PLEASURE IN DOING THINGS: NOT AT ALL
SUM OF ALL RESPONSES TO PHQ9 QUESTIONS 1 & 2: 0
SUM OF ALL RESPONSES TO PHQ QUESTIONS 1-9: 0
SUM OF ALL RESPONSES TO PHQ QUESTIONS 1-9: 0
2. FEELING DOWN, DEPRESSED OR HOPELESS: NOT AT ALL
SUM OF ALL RESPONSES TO PHQ QUESTIONS 1-9: 0
SUM OF ALL RESPONSES TO PHQ QUESTIONS 1-9: 0

## 2024-05-14 NOTE — PROGRESS NOTES
[facility]  [facility] MEDICINE  [address]  [address] [address]  [facility] [address]  Dept: [phone]    5/14/2024    CHIEF COMPLAINT    Chief Complaint   Patient presents with    thyroid       HPI    Marleni Shen is a 53 y.o. female who presents   Chief Complaint   Patient presents with    thyroid   .  Recheck thyroid, gerd, weight gain.   Gained weight through menopause. Has been trying to get more active. Currently doing weight watchers for the past 3 weeks, has lost 10 pounds.         Vitals:    05/14/24 0905   BP: 110/72   Pulse: 72   Weight: 101.2 kg (223 lb 3.2 oz)   Height: 1.702 m (5' 7\")       REVIEW OF SYSTEMS    Review of Systems   Constitutional:  Positive for unexpected weight change. Negative for fatigue and fever.   HENT: Negative.     Eyes: Negative.    Respiratory:  Negative for cough and shortness of breath.    Cardiovascular:  Negative for chest pain and leg swelling.   Gastrointestinal:  Negative for abdominal pain, blood in stool, constipation and diarrhea.   Endocrine: Negative.    Genitourinary:  Negative for frequency and urgency.   Musculoskeletal: Negative.    Skin: Negative.    Allergic/Immunologic: Negative.    Neurological:  Negative for dizziness and headaches.   Hematological: Negative.    Psychiatric/Behavioral:  Negative for sleep disturbance. The patient is not nervous/anxious.        PAST MEDICAL HISTORY    Past Medical History:   Diagnosis Date    Abnormal Pap smear of cervix     Closed fracture of shaft of right humerus with routine healing 2020    COVID-19 12/2020    Fx wrist, right, sequela 2020    GERD (gastroesophageal reflux disease)     Heart murmur     Hypothyroidism     PONV (postoperative nausea and vomiting)        FAMILY HISTORY    Family History   Problem Relation Age of Onset    Cancer Mother         unknown    COPD Father     Kidney Disease Father     Colon Polyps Brother     Heart Disease Brother     Obesity Brother

## 2024-09-21 ENCOUNTER — HOSPITAL ENCOUNTER (EMERGENCY)
Facility: CLINIC | Age: 54
Discharge: HOME | End: 2024-09-21
Attending: EMERGENCY MEDICINE

## 2024-09-21 ENCOUNTER — APPOINTMENT (OUTPATIENT)
Dept: GENERAL RADIOLOGY | Facility: CLINIC | Age: 54
End: 2024-09-21

## 2024-09-21 VITALS
HEART RATE: 72 BPM | HEIGHT: 68 IN | RESPIRATION RATE: 19 BRPM | TEMPERATURE: 98.4 F | BODY MASS INDEX: 31.83 KG/M2 | OXYGEN SATURATION: 98 % | WEIGHT: 210 LBS | SYSTOLIC BLOOD PRESSURE: 131 MMHG | DIASTOLIC BLOOD PRESSURE: 92 MMHG

## 2024-09-21 DIAGNOSIS — R07.9 CHEST PAIN, UNSPECIFIED TYPE: Primary | ICD-10-CM

## 2024-09-21 LAB
ALBUMIN SERPL-MCNC: 4.5 G/DL (ref 3.5–5.2)
ALBUMIN/GLOB SERPL: 1.4 {RATIO} (ref 1–2.5)
ALP SERPL-CCNC: 99 U/L (ref 35–104)
ALT SERPL-CCNC: 18 U/L (ref 5–33)
ANION GAP SERPL CALCULATED.3IONS-SCNC: 13 MMOL/L (ref 9–17)
AST SERPL-CCNC: 23 U/L
BASOPHILS # BLD: 0.1 K/UL (ref 0–0.2)
BASOPHILS NFR BLD: 1 % (ref 0–2)
BILIRUB SERPL-MCNC: 0.3 MG/DL (ref 0.3–1.2)
BUN SERPL-MCNC: 20 MG/DL (ref 6–20)
CALCIUM SERPL-MCNC: 9.2 MG/DL (ref 8.6–10.4)
CHLORIDE SERPL-SCNC: 107 MMOL/L (ref 98–107)
CO2 SERPL-SCNC: 22 MMOL/L (ref 20–31)
CREAT SERPL-MCNC: 0.9 MG/DL (ref 0.5–0.9)
D DIMER PPP FEU-MCNC: 0.48 UG/ML FEU
EOSINOPHIL # BLD: 0.1 K/UL (ref 0–0.4)
EOSINOPHILS RELATIVE PERCENT: 2 % (ref 1–4)
ERYTHROCYTE [DISTWIDTH] IN BLOOD BY AUTOMATED COUNT: 12.5 % (ref 12.5–15.4)
GFR, ESTIMATED: 75 ML/MIN/1.73M2
GLUCOSE SERPL-MCNC: 98 MG/DL (ref 70–99)
HCT VFR BLD AUTO: 38.2 % (ref 36–46)
HGB BLD-MCNC: 13.1 G/DL (ref 12–16)
LYMPHOCYTES NFR BLD: 2.1 K/UL (ref 1–4.8)
LYMPHOCYTES RELATIVE PERCENT: 26 % (ref 24–44)
MCH RBC QN AUTO: 31.9 PG (ref 26–34)
MCHC RBC AUTO-ENTMCNC: 34.3 G/DL (ref 31–37)
MCV RBC AUTO: 93 FL (ref 80–100)
MONOCYTES NFR BLD: 0.5 K/UL (ref 0.1–1.2)
MONOCYTES NFR BLD: 6 % (ref 2–11)
NEUTROPHILS NFR BLD: 65 % (ref 36–66)
NEUTS SEG NFR BLD: 5.2 K/UL (ref 1.8–7.7)
PLATELET # BLD AUTO: 298 K/UL (ref 140–450)
PMV BLD AUTO: 8.5 FL (ref 6–12)
POTASSIUM SERPL-SCNC: 4 MMOL/L (ref 3.7–5.3)
PROT SERPL-MCNC: 7.8 G/DL (ref 6.4–8.3)
RBC # BLD AUTO: 4.11 M/UL (ref 4–5.2)
SARS-COV-2 RDRP RESP QL NAA+PROBE: NOT DETECTED
SODIUM SERPL-SCNC: 142 MMOL/L (ref 135–144)
SPECIMEN DESCRIPTION: NORMAL
TROPONIN I SERPL HS-MCNC: 7 NG/L (ref 0–14)
TROPONIN I SERPL HS-MCNC: <6 NG/L (ref 0–14)
WBC OTHER # BLD: 7.9 K/UL (ref 3.5–11)

## 2024-09-21 PROCEDURE — 71046 X-RAY EXAM CHEST 2 VIEWS: CPT

## 2024-09-21 PROCEDURE — 85379 FIBRIN DEGRADATION QUANT: CPT

## 2024-09-21 PROCEDURE — 87635 SARS-COV-2 COVID-19 AMP PRB: CPT

## 2024-09-21 PROCEDURE — 99285 EMERGENCY DEPT VISIT HI MDM: CPT

## 2024-09-21 PROCEDURE — 36415 COLL VENOUS BLD VENIPUNCTURE: CPT

## 2024-09-21 PROCEDURE — 6370000000 HC RX 637 (ALT 250 FOR IP): Performed by: NURSE PRACTITIONER

## 2024-09-21 PROCEDURE — 93005 ELECTROCARDIOGRAM TRACING: CPT | Performed by: NURSE PRACTITIONER

## 2024-09-21 PROCEDURE — 85025 COMPLETE CBC W/AUTO DIFF WBC: CPT

## 2024-09-21 PROCEDURE — 80053 COMPREHEN METABOLIC PANEL: CPT

## 2024-09-21 PROCEDURE — 84484 ASSAY OF TROPONIN QUANT: CPT

## 2024-09-21 RX ORDER — ASPIRIN 81 MG/1
324 TABLET, CHEWABLE ORAL ONCE
Status: COMPLETED | OUTPATIENT
Start: 2024-09-21 | End: 2024-09-21

## 2024-09-21 RX ORDER — NITROGLYCERIN 0.4 MG/1
0.4 TABLET SUBLINGUAL EVERY 5 MIN PRN
Status: DISCONTINUED | OUTPATIENT
Start: 2024-09-21 | End: 2024-09-22 | Stop reason: HOSPADM

## 2024-09-21 RX ADMIN — ASPIRIN 81 MG CHEWABLE TABLET 324 MG: 81 TABLET CHEWABLE at 18:23

## 2024-09-21 ASSESSMENT — HEART SCORE: ECG: NORMAL

## 2024-09-21 ASSESSMENT — ENCOUNTER SYMPTOMS
BACK PAIN: 0
NAUSEA: 0
VOMITING: 0
COUGH: 0
DIARRHEA: 0
SHORTNESS OF BREATH: 0
ABDOMINAL PAIN: 0

## 2024-09-21 ASSESSMENT — PAIN - FUNCTIONAL ASSESSMENT: PAIN_FUNCTIONAL_ASSESSMENT: NONE - DENIES PAIN

## 2024-09-23 LAB
EKG ATRIAL RATE: 79 BPM
EKG P AXIS: 7 DEGREES
EKG P-R INTERVAL: 172 MS
EKG Q-T INTERVAL: 376 MS
EKG QRS DURATION: 74 MS
EKG QTC CALCULATION (BAZETT): 431 MS
EKG R AXIS: 56 DEGREES
EKG T AXIS: 38 DEGREES
EKG VENTRICULAR RATE: 79 BPM

## 2024-11-12 ENCOUNTER — HOSPITAL ENCOUNTER (OUTPATIENT)
Age: 54
Setting detail: SPECIMEN
Discharge: HOME | End: 2024-11-12

## 2024-11-12 ENCOUNTER — OFFICE VISIT (OUTPATIENT)
Dept: FAMILY MEDICINE CLINIC | Age: 54
End: 2024-11-12
Payer: COMMERCIAL

## 2024-11-12 VITALS
HEART RATE: 91 BPM | WEIGHT: 225 LBS | TEMPERATURE: 97.1 F | OXYGEN SATURATION: 96 % | DIASTOLIC BLOOD PRESSURE: 84 MMHG | SYSTOLIC BLOOD PRESSURE: 132 MMHG | HEIGHT: 68 IN | BODY MASS INDEX: 34.1 KG/M2

## 2024-11-12 DIAGNOSIS — E06.3 HYPOTHYROIDISM DUE TO HASHIMOTO'S THYROIDITIS: ICD-10-CM

## 2024-11-12 DIAGNOSIS — E66.811 CLASS 1 OBESITY DUE TO EXCESS CALORIES WITHOUT SERIOUS COMORBIDITY WITH BODY MASS INDEX (BMI) OF 34.0 TO 34.9 IN ADULT: Primary | ICD-10-CM

## 2024-11-12 DIAGNOSIS — E66.09 CLASS 1 OBESITY DUE TO EXCESS CALORIES WITHOUT SERIOUS COMORBIDITY WITH BODY MASS INDEX (BMI) OF 34.0 TO 34.9 IN ADULT: Primary | ICD-10-CM

## 2024-11-12 DIAGNOSIS — M72.2 PLANTAR FASCIITIS: ICD-10-CM

## 2024-11-12 LAB
T4 FREE SERPL-MCNC: 1.4 NG/DL (ref 0.92–1.68)
TSH SERPL DL<=0.05 MIU/L-ACNC: 8.65 UIU/ML (ref 0.27–4.2)

## 2024-11-12 PROCEDURE — 99214 OFFICE O/P EST MOD 30 MIN: CPT | Performed by: FAMILY MEDICINE

## 2024-11-12 SDOH — ECONOMIC STABILITY: FOOD INSECURITY: WITHIN THE PAST 12 MONTHS, THE FOOD YOU BOUGHT JUST DIDN'T LAST AND YOU DIDN'T HAVE MONEY TO GET MORE.: NEVER TRUE

## 2024-11-12 SDOH — ECONOMIC STABILITY: TRANSPORTATION INSECURITY
IN THE PAST 12 MONTHS, HAS LACK OF TRANSPORTATION KEPT YOU FROM MEETINGS, WORK, OR FROM GETTING THINGS NEEDED FOR DAILY LIVING?: NO

## 2024-11-12 SDOH — ECONOMIC STABILITY: FOOD INSECURITY: WITHIN THE PAST 12 MONTHS, YOU WORRIED THAT YOUR FOOD WOULD RUN OUT BEFORE YOU GOT MONEY TO BUY MORE.: NEVER TRUE

## 2024-11-12 SDOH — ECONOMIC STABILITY: INCOME INSECURITY: HOW HARD IS IT FOR YOU TO PAY FOR THE VERY BASICS LIKE FOOD, HOUSING, MEDICAL CARE, AND HEATING?: NOT HARD AT ALL

## 2024-11-12 NOTE — PROGRESS NOTES
[facility]  [facility] MEDICINE  [address]  [address]  [facility] [address]  Dept: [phone]    11/12/2024    CHIEF COMPLAINT    Chief Complaint   Patient presents with    Weight Management       HPI    Marleni Shen is a 54 y.o. female who presents   Chief Complaint   Patient presents with    Weight Management   .  HPI  History of Present Illness  The patient is a 54-year-old female who presents for a weight check.    She has been experiencing weight gain since her perimenopause phase and has been struggling to lose even a pound. She is interested in trying GLP-1 medications for weight loss, having previously tried various methods without success. Her daily diet includes a protein shake for breakfast and home-cooked meals for the rest of the day. She has attempted various weight loss strategies, including healthy eating and Weight Watchers, but has not found success with diets like Atkins. Her diet is well-regulated, as she experiences indigestion if it is not.    She believes her weight is exacerbating her plantar fasciitis, for which she is scheduled to undergo surgery. Despite two rounds of therapy and injections, she is unable to walk by the end of the day due to her job as a , which requires her to be on her feet all day.    She has been on a consistent dose of 150 mcg of thyroid medication for several years. She does not have diabetes or any associated health issues. Her cholesterol levels are typically within normal range. She is postmenopausal and has never taken weight loss medication in the past.    She also suffers from reflux and is currently taking Prilosec for it.    Vitals:    11/12/24 1110   BP: 132/84   Site: Left Upper Arm   Position: Sitting   Cuff Size: Large Adult   Pulse: 91   Temp: 97.1 °F (36.2 °C)   TempSrc: Temporal   SpO2: 96%   Weight: 102.1 kg (225 lb)   Height: 1.727 m (5' 8\")       REVIEW OF SYSTEMS    Review of

## 2024-11-18 ENCOUNTER — PATIENT MESSAGE (OUTPATIENT)
Dept: FAMILY MEDICINE CLINIC | Age: 54
End: 2024-11-18

## 2024-11-20 ENCOUNTER — TELEPHONE (OUTPATIENT)
Dept: FAMILY MEDICINE CLINIC | Age: 54
End: 2024-11-20

## 2024-11-20 DIAGNOSIS — E06.3 HYPOTHYROIDISM DUE TO HASHIMOTO'S THYROIDITIS: ICD-10-CM

## 2024-11-20 RX ORDER — LEVOTHYROXINE SODIUM 175 UG/1
175 TABLET ORAL DAILY
Qty: 90 TABLET | Refills: 1 | Status: SHIPPED | OUTPATIENT
Start: 2024-11-20

## 2024-11-20 NOTE — TELEPHONE ENCOUNTER
Patient called about her TSH test result. She was confused as you commented on her T4 but wanted to know if her medication needs changed because her TSH being elevated.

## 2024-12-09 DIAGNOSIS — E66.09 CLASS 1 OBESITY DUE TO EXCESS CALORIES WITHOUT SERIOUS COMORBIDITY WITH BODY MASS INDEX (BMI) OF 34.0 TO 34.9 IN ADULT: ICD-10-CM

## 2024-12-09 DIAGNOSIS — E66.811 CLASS 1 OBESITY DUE TO EXCESS CALORIES WITHOUT SERIOUS COMORBIDITY WITH BODY MASS INDEX (BMI) OF 34.0 TO 34.9 IN ADULT: ICD-10-CM

## 2025-01-17 ENCOUNTER — OFFICE VISIT (OUTPATIENT)
Dept: FAMILY MEDICINE CLINIC | Age: 55
End: 2025-01-17
Payer: COMMERCIAL

## 2025-01-17 VITALS
HEIGHT: 68 IN | WEIGHT: 228.6 LBS | BODY MASS INDEX: 34.65 KG/M2 | HEART RATE: 90 BPM | OXYGEN SATURATION: 99 % | DIASTOLIC BLOOD PRESSURE: 94 MMHG | SYSTOLIC BLOOD PRESSURE: 140 MMHG

## 2025-01-17 DIAGNOSIS — E66.811 CLASS 1 OBESITY DUE TO EXCESS CALORIES WITHOUT SERIOUS COMORBIDITY WITH BODY MASS INDEX (BMI) OF 34.0 TO 34.9 IN ADULT: ICD-10-CM

## 2025-01-17 DIAGNOSIS — E66.09 CLASS 1 OBESITY DUE TO EXCESS CALORIES WITHOUT SERIOUS COMORBIDITY WITH BODY MASS INDEX (BMI) OF 34.0 TO 34.9 IN ADULT: ICD-10-CM

## 2025-01-17 DIAGNOSIS — K59.03 DRUG-INDUCED CONSTIPATION: Primary | ICD-10-CM

## 2025-01-17 PROCEDURE — 99214 OFFICE O/P EST MOD 30 MIN: CPT | Performed by: FAMILY MEDICINE

## 2025-01-17 RX ORDER — TIRZEPATIDE 2.5 MG/.5ML
2.5 INJECTION, SOLUTION SUBCUTANEOUS WEEKLY
Qty: 2 ML | Refills: 0 | Status: SHIPPED | OUTPATIENT
Start: 2025-01-17 | End: 2025-02-16

## 2025-01-17 SDOH — ECONOMIC STABILITY: FOOD INSECURITY: WITHIN THE PAST 12 MONTHS, THE FOOD YOU BOUGHT JUST DIDN'T LAST AND YOU DIDN'T HAVE MONEY TO GET MORE.: NEVER TRUE

## 2025-01-17 SDOH — ECONOMIC STABILITY: FOOD INSECURITY: WITHIN THE PAST 12 MONTHS, YOU WORRIED THAT YOUR FOOD WOULD RUN OUT BEFORE YOU GOT MONEY TO BUY MORE.: NEVER TRUE

## 2025-01-17 ASSESSMENT — PATIENT HEALTH QUESTIONNAIRE - PHQ9
SUM OF ALL RESPONSES TO PHQ QUESTIONS 1-9: 0
2. FEELING DOWN, DEPRESSED OR HOPELESS: NOT AT ALL
SUM OF ALL RESPONSES TO PHQ9 QUESTIONS 1 & 2: 0
1. LITTLE INTEREST OR PLEASURE IN DOING THINGS: NOT AT ALL
SUM OF ALL RESPONSES TO PHQ QUESTIONS 1-9: 0

## 2025-01-17 NOTE — PROGRESS NOTES
and affect. Her   behavior is normal.       Assessment:      1. Drug-induced constipation    2. Class 1 obesity due to excess calories without serious comorbidity with body mass index (BMI) of 34.0 to 34.9 in adult          Plan:      Call or return to clinic prn if these symptoms worsen or fail to improve as anticipated.  I have reviewed the instructions with the patient, answering all questions to her satisfaction.    No follow-ups on file.  No orders of the defined types were placed in this encounter.    Orders Placed This Encounter   Medications    tirzepatide-weight management (ZEPBOUND) 2.5 MG/0.5ML SOLN subCUTAneous injection (VIAL)     Sig: Inject 0.5 mLs into the skin once a week     Dispense:  2 mL     Refill:  0     She is to do magnesium citrate  Get her bowels back to a regular  Once functioning regular then she can start the Zepbound  Electronically signed by Soni Rizvi DO on 1/17/2025 at 9:47 AM

## 2025-02-07 DIAGNOSIS — E66.811 CLASS 1 OBESITY DUE TO EXCESS CALORIES WITHOUT SERIOUS COMORBIDITY WITH BODY MASS INDEX (BMI) OF 34.0 TO 34.9 IN ADULT: ICD-10-CM

## 2025-02-07 DIAGNOSIS — E66.09 CLASS 1 OBESITY DUE TO EXCESS CALORIES WITHOUT SERIOUS COMORBIDITY WITH BODY MASS INDEX (BMI) OF 34.0 TO 34.9 IN ADULT: ICD-10-CM

## 2025-02-07 RX ORDER — TIRZEPATIDE 5 MG/.5ML
5 INJECTION, SOLUTION SUBCUTANEOUS WEEKLY
Qty: 2 ML | Refills: 0 | Status: SHIPPED | OUTPATIENT
Start: 2025-02-07

## 2025-02-24 DIAGNOSIS — E66.811 CLASS 1 OBESITY DUE TO EXCESS CALORIES WITHOUT SERIOUS COMORBIDITY WITH BODY MASS INDEX (BMI) OF 34.0 TO 34.9 IN ADULT: ICD-10-CM

## 2025-02-24 DIAGNOSIS — E66.09 CLASS 1 OBESITY DUE TO EXCESS CALORIES WITHOUT SERIOUS COMORBIDITY WITH BODY MASS INDEX (BMI) OF 34.0 TO 34.9 IN ADULT: ICD-10-CM

## 2025-02-24 RX ORDER — TIRZEPATIDE 5 MG/.5ML
INJECTION, SOLUTION SUBCUTANEOUS
Qty: 2 ML | Refills: 0 | Status: SHIPPED | OUTPATIENT
Start: 2025-02-24

## 2025-02-24 NOTE — TELEPHONE ENCOUNTER
Marleni Shen is calling to request a refill on the following medication(s):    Last Visit Date (If Applicable):  11/12/2024    Next Visit Date:    Visit date not found    Medication Request:  Requested Prescriptions     Pending Prescriptions Disp Refills    ZEPBOUND 5 MG/0.5ML SOLN subCUTAneous injection (VIAL) [Pharmacy Med Name: ZEPBOUND 5 MG/0.5ML SUBCUTANEOUS SOLUTION] 2 mL 0     Sig: INJECT 0.5 ML (5 MG) UNDER THE SKIN ONCE WEEKLY (0.5ML= 50 UNITS)

## 2025-03-04 DIAGNOSIS — E66.811 CLASS 1 OBESITY DUE TO EXCESS CALORIES WITHOUT SERIOUS COMORBIDITY WITH BODY MASS INDEX (BMI) OF 34.0 TO 34.9 IN ADULT: ICD-10-CM

## 2025-03-04 DIAGNOSIS — E66.09 CLASS 1 OBESITY DUE TO EXCESS CALORIES WITHOUT SERIOUS COMORBIDITY WITH BODY MASS INDEX (BMI) OF 34.0 TO 34.9 IN ADULT: ICD-10-CM

## 2025-03-04 RX ORDER — TIRZEPATIDE 5 MG/.5ML
5 INJECTION, SOLUTION SUBCUTANEOUS WEEKLY
Qty: 2 ML | Refills: 0 | Status: SHIPPED | OUTPATIENT
Start: 2025-03-04

## 2025-03-04 NOTE — TELEPHONE ENCOUNTER
Patient comment: Can i stay with the 5 ml please     Marleni Shen is calling to request a refill on the following medication(s):    Last Visit Date (If Applicable):  11/12/2024    Next Visit Date:    Visit date not found    Medication Request:  Requested Prescriptions     Pending Prescriptions Disp Refills    tirzepatide-weight management (ZEPBOUND) 5 MG/0.5ML SOLN subCUTAneous injection (VIAL) 2 mL 0

## 2025-03-24 DIAGNOSIS — E66.811 CLASS 1 OBESITY DUE TO EXCESS CALORIES WITHOUT SERIOUS COMORBIDITY WITH BODY MASS INDEX (BMI) OF 34.0 TO 34.9 IN ADULT: ICD-10-CM

## 2025-03-24 DIAGNOSIS — E66.09 CLASS 1 OBESITY DUE TO EXCESS CALORIES WITHOUT SERIOUS COMORBIDITY WITH BODY MASS INDEX (BMI) OF 34.0 TO 34.9 IN ADULT: ICD-10-CM

## 2025-03-24 RX ORDER — TIRZEPATIDE 5 MG/.5ML
INJECTION, SOLUTION SUBCUTANEOUS
Qty: 2 ML | Refills: 0 | Status: SHIPPED | OUTPATIENT
Start: 2025-03-24

## 2025-04-14 DIAGNOSIS — E66.811 CLASS 1 OBESITY DUE TO EXCESS CALORIES WITHOUT SERIOUS COMORBIDITY WITH BODY MASS INDEX (BMI) OF 34.0 TO 34.9 IN ADULT: ICD-10-CM

## 2025-04-14 DIAGNOSIS — E66.09 CLASS 1 OBESITY DUE TO EXCESS CALORIES WITHOUT SERIOUS COMORBIDITY WITH BODY MASS INDEX (BMI) OF 34.0 TO 34.9 IN ADULT: ICD-10-CM

## 2025-04-14 RX ORDER — TIRZEPATIDE 5 MG/.5ML
INJECTION, SOLUTION SUBCUTANEOUS
Qty: 2 ML | Refills: 0 | Status: SHIPPED | OUTPATIENT
Start: 2025-04-14

## 2025-05-05 DIAGNOSIS — E66.09 CLASS 1 OBESITY DUE TO EXCESS CALORIES WITHOUT SERIOUS COMORBIDITY WITH BODY MASS INDEX (BMI) OF 34.0 TO 34.9 IN ADULT: ICD-10-CM

## 2025-05-05 DIAGNOSIS — E66.811 CLASS 1 OBESITY DUE TO EXCESS CALORIES WITHOUT SERIOUS COMORBIDITY WITH BODY MASS INDEX (BMI) OF 34.0 TO 34.9 IN ADULT: ICD-10-CM

## 2025-05-05 RX ORDER — TIRZEPATIDE 5 MG/.5ML
INJECTION, SOLUTION SUBCUTANEOUS
Qty: 2 ML | Refills: 0 | Status: SHIPPED | OUTPATIENT
Start: 2025-05-05

## 2025-05-19 DIAGNOSIS — E06.3 HYPOTHYROIDISM DUE TO HASHIMOTO'S THYROIDITIS: ICD-10-CM

## 2025-05-19 RX ORDER — LEVOTHYROXINE SODIUM 175 UG/1
175 TABLET ORAL DAILY
Qty: 90 TABLET | Refills: 1 | Status: SHIPPED | OUTPATIENT
Start: 2025-05-19

## 2025-05-19 NOTE — TELEPHONE ENCOUNTER
Marleni Shen is calling to request a refill on the following medication(s):    Last Visit Date (If Applicable):  11/12/2024    Next Visit Date:    Visit date not found    Medication Request:  Requested Prescriptions     Pending Prescriptions Disp Refills    levothyroxine (SYNTHROID) 175 MCG tablet [Pharmacy Med Name: LEVOTHYROXINE 175 MCG TABLET] 90 tablet 1     Sig: TAKE 1 TABLET BY MOUTH DAILY

## 2025-07-02 DIAGNOSIS — E66.09 CLASS 1 OBESITY DUE TO EXCESS CALORIES WITHOUT SERIOUS COMORBIDITY WITH BODY MASS INDEX (BMI) OF 34.0 TO 34.9 IN ADULT: ICD-10-CM

## 2025-07-02 DIAGNOSIS — E66.811 CLASS 1 OBESITY DUE TO EXCESS CALORIES WITHOUT SERIOUS COMORBIDITY WITH BODY MASS INDEX (BMI) OF 34.0 TO 34.9 IN ADULT: ICD-10-CM

## 2025-07-02 RX ORDER — TIRZEPATIDE 5 MG/.5ML
INJECTION, SOLUTION SUBCUTANEOUS
Qty: 2 ML | Refills: 0 | Status: SHIPPED | OUTPATIENT
Start: 2025-07-02 | End: 2025-08-14

## 2025-08-14 DIAGNOSIS — E66.09 CLASS 1 OBESITY DUE TO EXCESS CALORIES WITHOUT SERIOUS COMORBIDITY WITH BODY MASS INDEX (BMI) OF 34.0 TO 34.9 IN ADULT: ICD-10-CM

## 2025-08-14 DIAGNOSIS — E66.811 CLASS 1 OBESITY DUE TO EXCESS CALORIES WITHOUT SERIOUS COMORBIDITY WITH BODY MASS INDEX (BMI) OF 34.0 TO 34.9 IN ADULT: ICD-10-CM

## 2025-08-14 RX ORDER — TIRZEPATIDE 5 MG/.5ML
INJECTION, SOLUTION SUBCUTANEOUS
Qty: 2 ML | Refills: 0 | Status: SHIPPED | OUTPATIENT
Start: 2025-08-14

## 2025-09-04 ENCOUNTER — OFFICE VISIT (OUTPATIENT)
Dept: FAMILY MEDICINE CLINIC | Age: 55
End: 2025-09-04
Payer: COMMERCIAL

## 2025-09-04 ENCOUNTER — HOSPITAL ENCOUNTER (OUTPATIENT)
Age: 55
Setting detail: SPECIMEN
Discharge: HOME | End: 2025-09-04

## 2025-09-04 VITALS
HEART RATE: 88 BPM | SYSTOLIC BLOOD PRESSURE: 132 MMHG | BODY MASS INDEX: 32.33 KG/M2 | DIASTOLIC BLOOD PRESSURE: 92 MMHG | WEIGHT: 212.6 LBS

## 2025-09-04 DIAGNOSIS — E06.3 HYPOTHYROIDISM DUE TO HASHIMOTO'S THYROIDITIS: ICD-10-CM

## 2025-09-04 LAB
T4 FREE SERPL-MCNC: 1.8 NG/DL (ref 0.92–1.68)
TSH SERPL DL<=0.05 MIU/L-ACNC: 0.02 UIU/ML (ref 0.27–4.2)

## 2025-09-04 PROCEDURE — 99214 OFFICE O/P EST MOD 30 MIN: CPT | Performed by: FAMILY MEDICINE

## (undated) DEVICE — COVER,MAYO STAND,XL,STERILE: Brand: MEDLINE

## (undated) DEVICE — DISC ABSRB YEL FLR POLYETH MGMT SUCT QUICKSUITE

## (undated) DEVICE — SYRINGE, LUER LOCK, 10ML: Brand: MEDLINE

## (undated) DEVICE — INTENDED FOR TISSUE SEPARATION, AND OTHER PROCEDURES THAT REQUIRE A SHARP SURGICAL BLADE TO PUNCTURE OR CUT.: Brand: BARD-PARKER ® CARBON RIB-BACK BLADES

## (undated) DEVICE — YANKAUER,SMOOTH HANDLE,HIGH CAPACITY: Brand: MEDLINE INDUSTRIES, INC.

## (undated) DEVICE — SINGLE PORT MANIFOLD: Brand: NEPTUNE 2

## (undated) DEVICE — GLOVE ORANGE PI 7   MSG9070

## (undated) DEVICE — TUBING, SUCTION, 9/32" X 20', STRAIGHT: Brand: MEDLINE INDUSTRIES, INC.

## (undated) DEVICE — PACK,SHOULDER,DRAPE,POUCH: Brand: MEDLINE

## (undated) DEVICE — SUTURE PROL SZ 3-0 L18IN NONABSORBABLE BLU L30MM FS-1 3/8 8663G

## (undated) DEVICE — SUTURE SUTTAPE FIBERLINK 1.3MM WHT BLU CLS LOOP AR7535

## (undated) DEVICE — MHPB ARTHROSCOPY PACK: Brand: MEDLINE INDUSTRIES, INC.

## (undated) DEVICE — STOCKINETTE,IMPERVIOUS,12X48,STERILE: Brand: MEDLINE

## (undated) DEVICE — SUTURE VCRL + SZ 3-0 L36IN ABSRB UD L36MM CT-1 1/2 CIR VCP944H

## (undated) DEVICE — Device

## (undated) DEVICE — ELECTROSURGICAL PENCIL BUTTON SWITCH E-Z CLEAN COATED BLADE ELECTRODE 10 FT (3 M) CORD HOLSTER: Brand: MEGADYNE

## (undated) DEVICE — PENCIL ES L3M BTTN SWCH HOLSTER W/ BLDE ELECTRD EDGE

## (undated) DEVICE — DRAPE,U/ SHT,SPLIT,PLAS,STERIL: Brand: MEDLINE

## (undated) DEVICE — INTENT TO BE USED WITH SUTURE MATERIAL FOR TISSUE CLOSURE: Brand: RICHARD-ALLAN® NEEDLE 1/2 CIRCLE TAPER

## (undated) DEVICE — MERCY HEALTH ST CHARLES: Brand: MEDLINE INDUSTRIES, INC.

## (undated) DEVICE — GLOVE ORANGE PI 7 1/2   MSG9075

## (undated) DEVICE — BLADE SURG NO15 S STL STR DISP GLASSVAN

## (undated) DEVICE — Z DUP USE 2648250 IMMOBILIZER ORTH L SHLDR BILAT UNISX COT ADJ CLS EL OPN HND

## (undated) DEVICE — CORD,CAUTERY,BIPOLAR,STERILE: Brand: MEDLINE

## (undated) DEVICE — GOWN,SIRUS,POLYRNF,BRTHSLV,XL,30/CS: Brand: MEDLINE

## (undated) DEVICE — SOLUTION IV IRRIG POUR BRL 0.9% SODIUM CHL 2F7124

## (undated) DEVICE — COVER,C-ARM,41X74: Brand: MEDLINE

## (undated) DEVICE — DRAPE,EXTREMITY,89X128,STERILE: Brand: MEDLINE

## (undated) DEVICE — BANDAGE,SELF ADHRNT,COFLEX,4"X5YD,STRL: Brand: COLABEL

## (undated) DEVICE — 3M™ STERI-DRAPE™ U-DRAPE 1015: Brand: STERI-DRAPE™

## (undated) DEVICE — SCREW BNE L20MM DIA2.4MM DST RAD VOLAR S STL ST VAR ANG LOK
Type: IMPLANTABLE DEVICE | Site: WRIST | Status: NON-FUNCTIONAL
Removed: 2020-05-05

## (undated) DEVICE — SUTURE VCRL SZ 0 L36IN ABSRB UD CT-1 L36MM 1/2 CIR TAPR PNT VCP946H

## (undated) DEVICE — YANKAUER,OPEN TIP,W/O VENT,STERILE: Brand: MEDLINE INDUSTRIES, INC.

## (undated) DEVICE — STERLING XTRASHARP SHAVER GREAT WHITE SHAVER BLADE, 4.2 MM: Brand: STERLING XTRASHARP SHAVER GREAT WHITE

## (undated) DEVICE — BANDAGE,ELASTIC,ESMARK,STERILE,6"X9',LF: Brand: MEDLINE

## (undated) DEVICE — TRAY BNE MAR CPRP BMA PLT SENS 1 BTTN AUTOMATION USED TO

## (undated) DEVICE — SUTURE ETHBND EXCEL SZ 1 L30IN NONABSORBABLE GRN L36MM CT-1 X425H

## (undated) DEVICE — KIT POS ULT SHLDR PT CARE REHAB SLNG

## (undated) DEVICE — BIT DRL L110MM DIA1.8MM QUIK CPL CALIB W/O STP REUSE

## (undated) DEVICE — CHLORAPREP 26ML ORANGE

## (undated) DEVICE — DRESSING,GAUZE,XEROFORM,CURAD,1"X8",ST: Brand: CURAD

## (undated) DEVICE — 1010 S-DRAPE TOWEL DRAPE 10/BX: Brand: STERI-DRAPE™

## (undated) DEVICE — BLANKET WRM W40.2XL55.9IN IORT LO BODY + MISTRAL AIR

## (undated) DEVICE — DRAPE,REIN 53X77,STERILE: Brand: MEDLINE

## (undated) DEVICE — TUBING SUCT 12FR MAL ALUM SHFT FN CAP VENT UNIV CONN W/ OBT

## (undated) DEVICE — SOLUTION IV IRRIG LACTATED RINGERS 3000ML 2B7487

## (undated) DEVICE — DRESSING TRNSPAR W5XL4.5IN FLM SHT SEMIPERMEABLE WIND

## (undated) DEVICE — 4-PORT MANIFOLD: Brand: NEPTUNE 2

## (undated) DEVICE — POUCH INSTR W6.75XL11.5IN FRST 2 PKT ADH FOR ORTH AND

## (undated) DEVICE — SUTURE FIBERSTICK SZ 2-0 L50IN BLU L12IN STIFF 1 END W/ SZ AR7222

## (undated) DEVICE — STRAP,POSITIONING,KNEE/BODY,FOAM,4X60": Brand: MEDLINE

## (undated) DEVICE — GOWN,AURORA,NONREINFORCED,LARGE: Brand: MEDLINE

## (undated) DEVICE — POUCH FLD 36X29IN SHLDR HVY LO GLARE MATTE FINISH FLM 2 SUCT

## (undated) DEVICE — COVER LT HNDL BLU PLAS

## (undated) DEVICE — ZIMMER® STERILE DISPOSABLE TOURNIQUET CUFF WITH PLC, DUAL PORT, SINGLE BLADDER, 18 IN. (46 CM)

## (undated) DEVICE — STRIP,CLOSURE,WOUND,MEDI-STRIP,1/2X4: Brand: MEDLINE

## (undated) DEVICE — CONTAINER,SPECIMEN,4OZ,OR STRL: Brand: MEDLINE

## (undated) DEVICE — NEEDLE SYR 18GA L1.5IN RED PLAS HUB S STL BLNT FILL W/O

## (undated) DEVICE — GOWN,AURORA,NONRNF,XL,30/CS: Brand: MEDLINE

## (undated) DEVICE — SPONGE LAP W18XL18IN WHT COT 4 PLY FLD STRUNG RADPQ DISP ST

## (undated) DEVICE — COVER,TABLE,60X90,STERILE: Brand: MEDLINE

## (undated) DEVICE — SYRINGE 20ML LL S/C 50

## (undated) DEVICE — SUTURE FIBERWIRE SZ 2 W/ TAPERED NEEDLE BLUE L38IN NONABSORB BLU L26.5MM 1/2 CIRCLE AR7200

## (undated) DEVICE — SOLUTION IV IRRIG WATER 1000ML POUR BRL 2F7114

## (undated) DEVICE — PROTECTOR ULN NRV PUR FOAM HK LOOP STRP ANATOMICALLY

## (undated) DEVICE — SPONGE GZ W4XL4IN 16 PLY DATA MSTR TAGGED DBL RADPQ

## (undated) DEVICE — Z DISCONTINUED USE 2218611 SUTURE PROL PS-1 PRECIS PT RVS CUT 3/8 CIR 24MM 18 IN SZ 3-0

## (undated) DEVICE — GAUZE,SPONGE,FLUFF,6"X6.75",STRL,5/TRAY: Brand: MEDLINE

## (undated) DEVICE — SUTURE VCRL + SZ 0 L27IN ABSRB WHT CT-2 1/2 CIR TAPERPOINT VCP270H

## (undated) DEVICE — SET HNDPC W COAX BNE CLN TIP SUCT TB BTTRY PWR DISPOSABLE

## (undated) DEVICE — K WIRE FIX L150MM DIA1.25MM S STL TRCR PNT
Type: IMPLANTABLE DEVICE | Site: WRIST | Status: NON-FUNCTIONAL
Removed: 2020-05-05

## (undated) DEVICE — NEEDLE SUT PASS FOR ROT CUF LABRAL REP MULTFI SCORPION

## (undated) DEVICE — SHEET, ORTHO, SPLIT, STERILE: Brand: MEDLINE

## (undated) DEVICE — BANDAGE,GAUZE,BULKEE II,4.5"X4.1YD,STRL: Brand: MEDLINE

## (undated) DEVICE — 4.2MM ULTRAFRR: Brand: STERLING ULTRAFRR

## (undated) DEVICE — COVER,MAYO STAND,STERILE: Brand: MEDLINE

## (undated) DEVICE — SYRINGE MED 50ML LUERLOCK TIP

## (undated) DEVICE — ELECTRODE ES L3IN S STL BLDE INSUL DISP VALLEYLAB EDGE

## (undated) DEVICE — TUBING, SUCTION, 9/32" X 12', STRAIGHT: Brand: MEDLINE INDUSTRIES, INC.

## (undated) DEVICE — PADDING,UNDERCAST,COTTON, 4"X4YD STERILE: Brand: MEDLINE

## (undated) DEVICE — SYRINGE, LUER LOCK, 3ML: Brand: MEDLINE

## (undated) DEVICE — BIT DRL L100MM DIA2MM QUIK CPL W/O STP REUSE

## (undated) DEVICE — GOWN,SIRUS,NONRNF,SETINSLV,XL,20/CS: Brand: MEDLINE

## (undated) DEVICE — MARKER,SKIN,WI/RULER AND LABELS: Brand: MEDLINE

## (undated) DEVICE — GLOVE ORANGE PI 8   MSG9080

## (undated) DEVICE — 3M™ IOBAN™ 2 ANTIMICROBIAL INCISE DRAPE 6651EZ: Brand: IOBAN™ 2

## (undated) DEVICE — Z INACTIVE USE 2660664 SOLUTION IRRIG 3000ML 0.9% SOD CHL USP UROMATIC PLAS CONT

## (undated) DEVICE — HANDPIECE SET WITH BONE CLEANING TIP AND SUCTION TUBE: Brand: INTERPULSE

## (undated) DEVICE — TUBING, SUCTION, 3/16" X 10', STRAIGHT: Brand: MEDLINE

## (undated) DEVICE — AMBIENT SUPER TURBOVAC 90: Brand: COBLATION

## (undated) DEVICE — SCREW BNE L20MM DIA2.4MM CORT S STL ST T8 STARDRV RECESS
Type: IMPLANTABLE DEVICE | Site: WRIST | Status: NON-FUNCTIONAL
Removed: 2020-05-05

## (undated) DEVICE — CANNULA ARTHSCP L4CM DIA8MM PASSPRT BTTN

## (undated) DEVICE — SUTURE VCRL + SZ 2-0 L27IN ABSRB UD CT-2 L26MM 1/2 CIR TAPR VCP269H

## (undated) DEVICE — TOWEL SURG W16XL26IN WHT NONFENESTRATED ST 4 PER PK